# Patient Record
Sex: FEMALE | Race: WHITE | NOT HISPANIC OR LATINO
[De-identification: names, ages, dates, MRNs, and addresses within clinical notes are randomized per-mention and may not be internally consistent; named-entity substitution may affect disease eponyms.]

---

## 2017-07-13 ENCOUNTER — APPOINTMENT (OUTPATIENT)
Dept: GASTROENTEROLOGY | Facility: CLINIC | Age: 57
End: 2017-07-13

## 2017-07-13 VITALS
WEIGHT: 148 LBS | BODY MASS INDEX: 23.23 KG/M2 | HEIGHT: 67 IN | DIASTOLIC BLOOD PRESSURE: 82 MMHG | SYSTOLIC BLOOD PRESSURE: 154 MMHG | HEART RATE: 63 BPM

## 2017-07-24 ENCOUNTER — RX RENEWAL (OUTPATIENT)
Age: 57
End: 2017-07-24

## 2017-10-10 ENCOUNTER — APPOINTMENT (OUTPATIENT)
Dept: GASTROENTEROLOGY | Facility: CLINIC | Age: 57
End: 2017-10-10
Payer: MEDICAID

## 2017-10-10 VITALS
SYSTOLIC BLOOD PRESSURE: 160 MMHG | HEIGHT: 67 IN | WEIGHT: 156 LBS | DIASTOLIC BLOOD PRESSURE: 91 MMHG | BODY MASS INDEX: 24.48 KG/M2 | HEART RATE: 78 BPM

## 2017-10-10 PROCEDURE — 99214 OFFICE O/P EST MOD 30 MIN: CPT

## 2017-10-10 RX ORDER — PANTOPRAZOLE 40 MG/1
40 TABLET, DELAYED RELEASE ORAL
Refills: 0 | Status: DISCONTINUED | COMMUNITY
End: 2017-10-10

## 2017-10-10 RX ORDER — PANTOPRAZOLE 40 MG/1
40 TABLET, DELAYED RELEASE ORAL
Qty: 30 | Refills: 5 | Status: COMPLETED | COMMUNITY
Start: 2017-07-24 | End: 2017-08-19

## 2017-10-10 RX ORDER — RANITIDINE HYDROCHLORIDE 150 MG/1
150 CAPSULE ORAL
Refills: 0 | Status: DISCONTINUED | COMMUNITY
End: 2017-10-10

## 2017-12-12 ENCOUNTER — APPOINTMENT (OUTPATIENT)
Dept: GASTROENTEROLOGY | Facility: CLINIC | Age: 57
End: 2017-12-12

## 2018-01-16 ENCOUNTER — APPOINTMENT (OUTPATIENT)
Dept: CARDIOLOGY | Facility: CLINIC | Age: 58
End: 2018-01-16
Payer: SELF-PAY

## 2018-01-16 PROCEDURE — 93000 ELECTROCARDIOGRAM COMPLETE: CPT

## 2018-01-16 PROCEDURE — 99214 OFFICE O/P EST MOD 30 MIN: CPT

## 2018-02-01 ENCOUNTER — RX RENEWAL (OUTPATIENT)
Age: 58
End: 2018-02-01

## 2018-02-02 ENCOUNTER — RX RENEWAL (OUTPATIENT)
Age: 58
End: 2018-02-02

## 2018-02-02 ENCOUNTER — MEDICATION RENEWAL (OUTPATIENT)
Age: 58
End: 2018-02-02

## 2018-02-05 ENCOUNTER — APPOINTMENT (OUTPATIENT)
Dept: GASTROENTEROLOGY | Facility: CLINIC | Age: 58
End: 2018-02-05
Payer: COMMERCIAL

## 2018-02-05 VITALS
BODY MASS INDEX: 24.33 KG/M2 | OXYGEN SATURATION: 99 % | RESPIRATION RATE: 18 BRPM | DIASTOLIC BLOOD PRESSURE: 81 MMHG | HEIGHT: 67 IN | WEIGHT: 155 LBS | HEART RATE: 81 BPM | SYSTOLIC BLOOD PRESSURE: 160 MMHG

## 2018-02-05 PROCEDURE — 99214 OFFICE O/P EST MOD 30 MIN: CPT

## 2018-02-05 RX ORDER — PANTOPRAZOLE 40 MG/1
40 TABLET, DELAYED RELEASE ORAL
Qty: 30 | Refills: 5 | Status: COMPLETED | COMMUNITY
Start: 2017-08-20 | End: 2017-09-09

## 2018-02-05 RX ORDER — NEBIVOLOL HYDROCHLORIDE 5 MG/1
5 TABLET ORAL DAILY
Qty: 90 | Refills: 3 | Status: COMPLETED | COMMUNITY
End: 2018-02-05

## 2018-02-15 ENCOUNTER — LABORATORY RESULT (OUTPATIENT)
Age: 58
End: 2018-02-15

## 2018-02-16 LAB
BASOPHILS # BLD AUTO: 0.04 K/UL
BASOPHILS NFR BLD AUTO: 0.9 %
EOSINOPHIL # BLD AUTO: 0.17 K/UL
EOSINOPHIL NFR BLD AUTO: 3.7 %
HCT VFR BLD CALC: 43.9 %
HGB BLD-MCNC: 14.6 G/DL
IMM GRANULOCYTES NFR BLD AUTO: 0 %
LYMPHOCYTES # BLD AUTO: 1.82 K/UL
LYMPHOCYTES NFR BLD AUTO: 39.4 %
MAN DIFF?: NORMAL
MCHC RBC-ENTMCNC: 30.5 PG
MCHC RBC-ENTMCNC: 33.3 GM/DL
MCV RBC AUTO: 91.8 FL
MONOCYTES # BLD AUTO: 0.76 K/UL
MONOCYTES NFR BLD AUTO: 16.5 %
NEUTROPHILS # BLD AUTO: 1.83 K/UL
NEUTROPHILS NFR BLD AUTO: 39.5 %
PLATELET # BLD AUTO: 335 K/UL
RBC # BLD: 4.78 M/UL
RBC # FLD: 14 %
WBC # FLD AUTO: 4.62 K/UL

## 2018-02-26 ENCOUNTER — APPOINTMENT (OUTPATIENT)
Dept: PULMONOLOGY | Facility: CLINIC | Age: 58
End: 2018-02-26
Payer: COMMERCIAL

## 2018-02-26 VITALS
BODY MASS INDEX: 24.12 KG/M2 | DIASTOLIC BLOOD PRESSURE: 80 MMHG | OXYGEN SATURATION: 98 % | WEIGHT: 154 LBS | HEART RATE: 89 BPM | SYSTOLIC BLOOD PRESSURE: 120 MMHG

## 2018-02-26 VITALS — RESPIRATION RATE: 16 BRPM

## 2018-02-26 DIAGNOSIS — Z00.00 ENCOUNTER FOR GENERAL ADULT MEDICAL EXAMINATION W/OUT ABNORMAL FINDINGS: ICD-10-CM

## 2018-02-26 PROCEDURE — 94727 GAS DIL/WSHOT DETER LNG VOL: CPT

## 2018-02-26 PROCEDURE — 94729 DIFFUSING CAPACITY: CPT

## 2018-02-26 PROCEDURE — 85018 HEMOGLOBIN: CPT | Mod: QW

## 2018-02-26 PROCEDURE — 94664 DEMO&/EVAL PT USE INHALER: CPT | Mod: 59

## 2018-02-26 PROCEDURE — 94060 EVALUATION OF WHEEZING: CPT

## 2018-02-26 PROCEDURE — 99205 OFFICE O/P NEW HI 60 MIN: CPT | Mod: 25

## 2018-02-26 RX ORDER — AZITHROMYCIN 250 MG/1
250 TABLET, FILM COATED ORAL
Qty: 6 | Refills: 0 | Status: DISCONTINUED | COMMUNITY
Start: 2018-02-16

## 2018-02-26 RX ORDER — RANITIDINE 150 MG/1
150 TABLET ORAL
Qty: 30 | Refills: 0 | Status: DISCONTINUED | COMMUNITY
Start: 2018-02-01 | End: 2018-02-26

## 2018-02-28 ENCOUNTER — OTHER (OUTPATIENT)
Age: 58
End: 2018-02-28

## 2018-04-05 ENCOUNTER — APPOINTMENT (OUTPATIENT)
Dept: PULMONOLOGY | Facility: CLINIC | Age: 58
End: 2018-04-05
Payer: COMMERCIAL

## 2018-04-05 ENCOUNTER — RESULT REVIEW (OUTPATIENT)
Age: 58
End: 2018-04-05

## 2018-04-05 ENCOUNTER — APPOINTMENT (OUTPATIENT)
Dept: DERMATOLOGY | Facility: CLINIC | Age: 58
End: 2018-04-05
Payer: COMMERCIAL

## 2018-04-05 VITALS — SYSTOLIC BLOOD PRESSURE: 124 MMHG | OXYGEN SATURATION: 98 % | HEART RATE: 66 BPM | DIASTOLIC BLOOD PRESSURE: 80 MMHG

## 2018-04-05 VITALS — BODY MASS INDEX: 23.96 KG/M2 | WEIGHT: 153 LBS

## 2018-04-05 PROCEDURE — 99214 OFFICE O/P EST MOD 30 MIN: CPT

## 2018-04-05 PROCEDURE — 99203 OFFICE O/P NEW LOW 30 MIN: CPT | Mod: 25

## 2018-04-05 PROCEDURE — 11100 BX SKIN SUBCUTANEOUS&/MUCOUS MEMBRANE 1 LESION: CPT

## 2018-04-09 ENCOUNTER — RX RENEWAL (OUTPATIENT)
Age: 58
End: 2018-04-09

## 2018-04-23 ENCOUNTER — MEDICATION RENEWAL (OUTPATIENT)
Age: 58
End: 2018-04-23

## 2018-05-07 ENCOUNTER — RX RENEWAL (OUTPATIENT)
Age: 58
End: 2018-05-07

## 2018-05-23 ENCOUNTER — APPOINTMENT (OUTPATIENT)
Dept: GASTROENTEROLOGY | Facility: CLINIC | Age: 58
End: 2018-05-23

## 2018-06-14 ENCOUNTER — APPOINTMENT (OUTPATIENT)
Dept: PULMONOLOGY | Facility: CLINIC | Age: 58
End: 2018-06-14
Payer: COMMERCIAL

## 2018-06-14 ENCOUNTER — APPOINTMENT (OUTPATIENT)
Dept: GASTROENTEROLOGY | Facility: CLINIC | Age: 58
End: 2018-06-14
Payer: COMMERCIAL

## 2018-06-14 VITALS
HEIGHT: 67 IN | DIASTOLIC BLOOD PRESSURE: 70 MMHG | SYSTOLIC BLOOD PRESSURE: 128 MMHG | BODY MASS INDEX: 23.54 KG/M2 | WEIGHT: 150 LBS | HEART RATE: 69 BPM | OXYGEN SATURATION: 98 %

## 2018-06-14 VITALS
HEIGHT: 67 IN | WEIGHT: 150 LBS | DIASTOLIC BLOOD PRESSURE: 89 MMHG | BODY MASS INDEX: 23.54 KG/M2 | RESPIRATION RATE: 16 BRPM | HEART RATE: 69 BPM | SYSTOLIC BLOOD PRESSURE: 150 MMHG

## 2018-06-14 VITALS — RESPIRATION RATE: 16 BRPM

## 2018-06-14 PROCEDURE — 99214 OFFICE O/P EST MOD 30 MIN: CPT

## 2018-06-14 RX ORDER — PANTOPRAZOLE 40 MG/1
40 TABLET, DELAYED RELEASE ORAL
Qty: 60 | Refills: 4 | Status: COMPLETED | COMMUNITY
Start: 2017-10-10 | End: 2018-04-08

## 2018-07-10 ENCOUNTER — APPOINTMENT (OUTPATIENT)
Dept: PULMONOLOGY | Facility: CLINIC | Age: 58
End: 2018-07-10

## 2018-07-12 ENCOUNTER — OTHER (OUTPATIENT)
Age: 58
End: 2018-07-12

## 2018-09-10 ENCOUNTER — RX RENEWAL (OUTPATIENT)
Age: 58
End: 2018-09-10

## 2018-09-13 ENCOUNTER — APPOINTMENT (OUTPATIENT)
Dept: PULMONOLOGY | Facility: CLINIC | Age: 58
End: 2018-09-13

## 2018-09-27 ENCOUNTER — APPOINTMENT (OUTPATIENT)
Dept: GASTROENTEROLOGY | Facility: CLINIC | Age: 58
End: 2018-09-27

## 2018-10-11 ENCOUNTER — RX RENEWAL (OUTPATIENT)
Age: 58
End: 2018-10-11

## 2018-10-22 ENCOUNTER — APPOINTMENT (OUTPATIENT)
Dept: ENDOCRINOLOGY | Facility: CLINIC | Age: 58
End: 2018-10-22
Payer: COMMERCIAL

## 2018-10-22 VITALS
WEIGHT: 142 LBS | BODY MASS INDEX: 22.29 KG/M2 | HEIGHT: 67 IN | SYSTOLIC BLOOD PRESSURE: 122 MMHG | HEART RATE: 64 BPM | DIASTOLIC BLOOD PRESSURE: 80 MMHG

## 2018-10-22 DIAGNOSIS — Z78.9 OTHER SPECIFIED HEALTH STATUS: ICD-10-CM

## 2018-10-22 DIAGNOSIS — Z83.49 FAMILY HISTORY OF OTHER ENDOCRINE, NUTRITIONAL AND METABOLIC DISEASES: ICD-10-CM

## 2018-10-22 DIAGNOSIS — Z80.9 FAMILY HISTORY OF MALIGNANT NEOPLASM, UNSPECIFIED: ICD-10-CM

## 2018-10-22 DIAGNOSIS — Z82.49 FAMILY HISTORY OF ISCHEMIC HEART DISEASE AND OTHER DISEASES OF THE CIRCULATORY SYSTEM: ICD-10-CM

## 2018-10-22 PROCEDURE — 99214 OFFICE O/P EST MOD 30 MIN: CPT

## 2018-10-29 ENCOUNTER — OTHER (OUTPATIENT)
Age: 58
End: 2018-10-29

## 2018-10-29 LAB
ALBUMIN SERPL ELPH-MCNC: 3.9 G/DL
ALP BLD-CCNC: 87 U/L
ALT SERPL-CCNC: 19 U/L
ANION GAP SERPL CALC-SCNC: 12 MMOL/L
AST SERPL-CCNC: 15 U/L
BASOPHILS # BLD AUTO: 0.03 K/UL
BASOPHILS NFR BLD AUTO: 0.4 %
BILIRUB SERPL-MCNC: 0.4 MG/DL
BUN SERPL-MCNC: 14 MG/DL
CALCIUM SERPL-MCNC: 9.8 MG/DL
CHLORIDE SERPL-SCNC: 106 MMOL/L
CO2 SERPL-SCNC: 27 MMOL/L
CREAT SERPL-MCNC: 0.65 MG/DL
EOSINOPHIL # BLD AUTO: 0.26 K/UL
EOSINOPHIL NFR BLD AUTO: 3.3 %
GLUCOSE SERPL-MCNC: 97 MG/DL
HCT VFR BLD CALC: 40.1 %
HGB BLD-MCNC: 13.3 G/DL
IMM GRANULOCYTES NFR BLD AUTO: 0.3 %
LYMPHOCYTES # BLD AUTO: 3.23 K/UL
LYMPHOCYTES NFR BLD AUTO: 40.6 %
MAN DIFF?: NORMAL
MCHC RBC-ENTMCNC: 28.7 PG
MCHC RBC-ENTMCNC: 33.2 GM/DL
MCV RBC AUTO: 86.4 FL
MONOCYTES # BLD AUTO: 0.63 K/UL
MONOCYTES NFR BLD AUTO: 7.9 %
NEUTROPHILS # BLD AUTO: 3.79 K/UL
NEUTROPHILS NFR BLD AUTO: 47.5 %
PLATELET # BLD AUTO: 365 K/UL
POTASSIUM SERPL-SCNC: 4 MMOL/L
PROT SERPL-MCNC: 7 G/DL
RBC # BLD: 4.64 M/UL
RBC # FLD: 12.7 %
SODIUM SERPL-SCNC: 145 MMOL/L
T3 SERPL-MCNC: 197 NG/DL
T4 FREE SERPL-MCNC: 2.5 NG/DL
TSH SERPL-ACNC: <0.01 UIU/ML
TSI ACT/NOR SER: 3.59 IU/L
WBC # FLD AUTO: 7.96 K/UL

## 2018-11-13 ENCOUNTER — OTHER (OUTPATIENT)
Age: 58
End: 2018-11-13

## 2018-11-18 ENCOUNTER — FORM ENCOUNTER (OUTPATIENT)
Age: 58
End: 2018-11-18

## 2018-11-19 ENCOUNTER — OUTPATIENT (OUTPATIENT)
Dept: OUTPATIENT SERVICES | Facility: HOSPITAL | Age: 58
LOS: 1 days | End: 2018-11-19

## 2018-11-19 ENCOUNTER — APPOINTMENT (OUTPATIENT)
Dept: NUCLEAR MEDICINE | Facility: CLINIC | Age: 58
End: 2018-11-19
Payer: COMMERCIAL

## 2018-11-19 DIAGNOSIS — E05.00 THYROTOXICOSIS WITH DIFFUSE GOITER WITHOUT THYROTOXIC CRISIS OR STORM: ICD-10-CM

## 2018-11-19 PROCEDURE — 78014 THYROID IMAGING W/BLOOD FLOW: CPT | Mod: 26

## 2018-11-20 ENCOUNTER — APPOINTMENT (OUTPATIENT)
Dept: NUCLEAR MEDICINE | Facility: CLINIC | Age: 58
End: 2018-11-20

## 2018-11-21 ENCOUNTER — RESULT REVIEW (OUTPATIENT)
Age: 58
End: 2018-11-21

## 2018-11-27 ENCOUNTER — RX RENEWAL (OUTPATIENT)
Age: 58
End: 2018-11-27

## 2018-11-28 ENCOUNTER — OTHER (OUTPATIENT)
Age: 58
End: 2018-11-28

## 2018-11-28 ENCOUNTER — RX RENEWAL (OUTPATIENT)
Age: 58
End: 2018-11-28

## 2019-01-08 ENCOUNTER — RECORD ABSTRACTING (OUTPATIENT)
Age: 59
End: 2019-01-08

## 2019-01-10 RX ORDER — MESALAMINE 375 MG/1
0.38 CAPSULE, EXTENDED RELEASE ORAL DAILY
Qty: 360 | Refills: 1 | Status: DISCONTINUED | COMMUNITY
Start: 2018-06-14 | End: 2019-01-10

## 2019-01-11 LAB
BASOPHILS # BLD AUTO: 0.04 K/UL
BASOPHILS NFR BLD AUTO: 0.5 %
EOSINOPHIL # BLD AUTO: 0.28 K/UL
EOSINOPHIL NFR BLD AUTO: 3.4 %
HCT VFR BLD CALC: 43.3 %
HGB BLD-MCNC: 14 G/DL
IMM GRANULOCYTES NFR BLD AUTO: 0.2 %
LYMPHOCYTES # BLD AUTO: 2.71 K/UL
LYMPHOCYTES NFR BLD AUTO: 33.4 %
MAN DIFF?: NORMAL
MCHC RBC-ENTMCNC: 29.2 PG
MCHC RBC-ENTMCNC: 32.3 GM/DL
MCV RBC AUTO: 90.4 FL
MONOCYTES # BLD AUTO: 0.54 K/UL
MONOCYTES NFR BLD AUTO: 6.7 %
NEUTROPHILS # BLD AUTO: 4.53 K/UL
NEUTROPHILS NFR BLD AUTO: 55.8 %
PLATELET # BLD AUTO: 425 K/UL
RBC # BLD: 4.79 M/UL
RBC # FLD: 14 %
WBC # FLD AUTO: 8.12 K/UL

## 2019-01-14 LAB
ALBUMIN SERPL ELPH-MCNC: 4.4 G/DL
ALP BLD-CCNC: 102 U/L
ALT SERPL-CCNC: 28 U/L
ANION GAP SERPL CALC-SCNC: 12 MMOL/L
AST SERPL-CCNC: 22 U/L
BILIRUB SERPL-MCNC: 0.4 MG/DL
BUN SERPL-MCNC: 11 MG/DL
CALCIUM SERPL-MCNC: 10.1 MG/DL
CHLORIDE SERPL-SCNC: 104 MMOL/L
CO2 SERPL-SCNC: 26 MMOL/L
CREAT SERPL-MCNC: 0.9 MG/DL
GLUCOSE SERPL-MCNC: 107 MG/DL
POTASSIUM SERPL-SCNC: 4.6 MMOL/L
PROT SERPL-MCNC: 7.3 G/DL
SODIUM SERPL-SCNC: 142 MMOL/L
T3 SERPL-MCNC: 130 NG/DL
T4 FREE SERPL-MCNC: 1.1 NG/DL
TSH SERPL-ACNC: 0.01 UIU/ML

## 2019-01-15 ENCOUNTER — APPOINTMENT (OUTPATIENT)
Dept: ENDOCRINOLOGY | Facility: CLINIC | Age: 59
End: 2019-01-15
Payer: COMMERCIAL

## 2019-01-15 VITALS
HEIGHT: 67 IN | SYSTOLIC BLOOD PRESSURE: 140 MMHG | WEIGHT: 148 LBS | HEART RATE: 64 BPM | BODY MASS INDEX: 23.23 KG/M2 | DIASTOLIC BLOOD PRESSURE: 90 MMHG

## 2019-01-15 DIAGNOSIS — Z87.19 PERSONAL HISTORY OF OTHER DISEASES OF THE DIGESTIVE SYSTEM: ICD-10-CM

## 2019-01-15 PROCEDURE — 99214 OFFICE O/P EST MOD 30 MIN: CPT

## 2019-01-15 RX ORDER — KETOCONAZOLE 20 MG/G
2 CREAM TOPICAL
Qty: 60 | Refills: 0 | Status: DISCONTINUED | COMMUNITY
Start: 2018-04-10 | End: 2019-01-15

## 2019-01-15 RX ORDER — BUDESONIDE 3 MG/1
3 CAPSULE, COATED PELLETS ORAL
Qty: 270 | Refills: 1 | Status: DISCONTINUED | COMMUNITY
Start: 2018-05-07 | End: 2019-01-15

## 2019-01-15 RX ORDER — RANITIDINE 300 MG/1
300 TABLET ORAL
Qty: 90 | Refills: 2 | Status: DISCONTINUED | COMMUNITY
Start: 2018-11-28 | End: 2019-01-15

## 2019-01-15 RX ORDER — PREDNISONE 20 MG/1
20 TABLET ORAL
Qty: 10 | Refills: 0 | Status: DISCONTINUED | COMMUNITY
Start: 2018-01-01 | End: 2019-01-15

## 2019-01-15 RX ORDER — HYDROCORTISONE VALERATE 2 MG/G
0.2 CREAM TOPICAL
Qty: 60 | Refills: 0 | Status: DISCONTINUED | COMMUNITY
Start: 2018-04-05 | End: 2019-01-15

## 2019-01-15 RX ORDER — RANITIDINE HYDROCHLORIDE 150 MG/1
150 CAPSULE ORAL
Qty: 90 | Refills: 3 | Status: DISCONTINUED | COMMUNITY
Start: 2017-09-18 | End: 2019-01-15

## 2019-01-15 NOTE — PHYSICAL EXAM
[Alert] : alert [No Acute Distress] : no acute distress [Well Nourished] : well nourished [Well Developed] : well developed [Normal Sclera/Conjunctiva] : normal sclera/conjunctiva [EOMI] : extra ocular movement intact [No Proptosis] : no proptosis [Normal Oropharynx] : the oropharynx was normal [Thyroid Not Enlarged] : the thyroid was not enlarged [No Thyroid Nodules] : there were no palpable thyroid nodules [No Respiratory Distress] : no respiratory distress [No Accessory Muscle Use] : no accessory muscle use [Clear to Auscultation] : lungs were clear to auscultation bilaterally [Normal Rate] : heart rate was normal  [Normal S1, S2] : normal S1 and S2 [Regular Rhythm] : with a regular rhythm [Pedal Pulses Normal] : the pedal pulses are present [No Edema] : there was no peripheral edema [Normal Bowel Sounds] : normal bowel sounds [Not Tender] : non-tender [Soft] : abdomen soft [Not Distended] : not distended [Post Cervical Nodes] : posterior cervical nodes [Anterior Cervical Nodes] : anterior cervical nodes [Normal] : normal and non tender [No Stigmata of Cushings Syndrome] : no stigmata of cushings syndrome [Normal Gait] : normal gait [Normal Strength/Tone] : muscle strength and tone were normal [No Rash] : no rash [Normal Reflexes] : deep tendon reflexes were 2+ and symmetric [No Tremors] : no tremors [Oriented x3] : oriented to person, place, and time [Acanthosis Nigricans] : no acanthosis nigricans

## 2019-01-15 NOTE — ASSESSMENT
[Methimazole Therapy] : Risks and benefits of methimazole therapy were discussed with the patient,  including rash, liver dysfunction, and agranulocytosis.  Patient was instructed to call the office for flu-like symptoms eg fever and sore-throat [FreeTextEntry1] : Graves disease \par thyroid US shows multiple subcentimeter nodule stable. l that don't require FNA \par thyroid uptake/scan shows uptake 65 % \par Tfst are getting better with normal Ft4 and TT 3 but TSH still suppressed but it could lag \par decrease MMi to 5 mg qd   \par \par NTMNG : \par f/p FNA benign 2016 \par b?l subcentimter nodules. Monitor and repeat US in Nov 2019 \par \par Graves : discussed autoimmunity etiology

## 2019-02-27 ENCOUNTER — RX RENEWAL (OUTPATIENT)
Age: 59
End: 2019-02-27

## 2019-03-15 ENCOUNTER — APPOINTMENT (OUTPATIENT)
Dept: GASTROENTEROLOGY | Facility: CLINIC | Age: 59
End: 2019-03-15
Payer: COMMERCIAL

## 2019-03-15 VITALS
HEIGHT: 67 IN | HEART RATE: 79 BPM | WEIGHT: 151 LBS | DIASTOLIC BLOOD PRESSURE: 80 MMHG | SYSTOLIC BLOOD PRESSURE: 120 MMHG | BODY MASS INDEX: 23.7 KG/M2

## 2019-03-15 PROCEDURE — 99214 OFFICE O/P EST MOD 30 MIN: CPT

## 2019-03-15 NOTE — HISTORY OF PRESENT ILLNESS
[de-identified] : Patient is here for followup of GERD and collagenous colitis\par     The patient has had GERD for over 10 years. Specifically she gets acid regurgitation. 8 symptoms are severe and occurs after eating. She gets nausea and bloating as well. Currently she is on Protonix in the morning and  3 pm. She was taking Zantac 300 mg q.h.s. Despite that she still gets severe heartburn. She wakes up a sore throat in the morning. She elevate the head of her bed at night and still gets a sour taste in her mouth in the morning. Symptoms last hours\par     The patient has a long-standing history of collagenous colitis. She was taking his aspirin milligrams b.i.d. she stopped as his symptoms had completely resolved. When she stopped the diarrhea resumed. She is now again on  budesonide 3 mg b.i.d. she states when she takes the budesonide 3 times a day she gets constipated. At that time dozing she gets a bowel movement in the morning but states that she urinates and has a bowel movement at the same time. She feels that she has no controlled with the bowel movement though she denies diarrhea.

## 2019-03-15 NOTE — ASSESSMENT
[FreeTextEntry1] : A/P\par The patient is to follow GERD. She will continue with Protonix in the morning, Protonix at 3 PM, and Zantac 200 mg q.h.s. I will add Carafate 1 g q.h.s. and 1 g in the morning when she wakes up\par Today's instructions for acid reflux include avoid provocative foods. For example citrus alcohol coffee chocolate mints. Smaller meals, no eating 3 hours prior to bedtime and elevate head of the bed prior to sleep.\par - Collagenous colitis. The patient will increase her budesonide 3 mg t.i.d.\par -Follow up in 3 months

## 2019-03-15 NOTE — PHYSICAL EXAM
[General Appearance - Alert] : alert [General Appearance - In No Acute Distress] : in no acute distress [General Appearance - Well Nourished] : well nourished [General Appearance - Well Developed] : well developed [Sclera] : the sclera and conjunctiva were normal [PERRL With Normal Accommodation] : pupils were equal in size, round, and reactive to light [Extraocular Movements] : extraocular movements were intact [Outer Ear] : the ears and nose were normal in appearance [Neck Appearance] : the appearance of the neck was normal [Respiration, Rhythm And Depth] : normal respiratory rhythm and effort [Exaggerated Use Of Accessory Muscles For Inspiration] : no accessory muscle use [Auscultation Breath Sounds / Voice Sounds] : lungs were clear to auscultation bilaterally [Apical Impulse] : the apical impulse was normal [Heart Rate And Rhythm] : heart rate was normal and rhythm regular [Heart Sounds] : normal S1 and S2 [Bowel Sounds] : normal bowel sounds [Abdomen Soft] : soft [Abdomen Tenderness] : non-tender [Skin Color & Pigmentation] : normal skin color and pigmentation [Skin Turgor] : normal skin turgor [] : no rash [Oriented To Time, Place, And Person] : oriented to person, place, and time [Impaired Insight] : insight and judgment were intact [Affect] : the affect was normal

## 2019-04-01 ENCOUNTER — RX RENEWAL (OUTPATIENT)
Age: 59
End: 2019-04-01

## 2019-04-03 ENCOUNTER — RX RENEWAL (OUTPATIENT)
Age: 59
End: 2019-04-03

## 2019-04-11 ENCOUNTER — LABORATORY RESULT (OUTPATIENT)
Age: 59
End: 2019-04-11

## 2019-04-11 ENCOUNTER — APPOINTMENT (OUTPATIENT)
Dept: CARDIOLOGY | Facility: CLINIC | Age: 59
End: 2019-04-11
Payer: COMMERCIAL

## 2019-04-11 ENCOUNTER — NON-APPOINTMENT (OUTPATIENT)
Age: 59
End: 2019-04-11

## 2019-04-11 VITALS
RESPIRATION RATE: 16 BRPM | BODY MASS INDEX: 23.54 KG/M2 | HEART RATE: 59 BPM | HEIGHT: 67 IN | SYSTOLIC BLOOD PRESSURE: 163 MMHG | DIASTOLIC BLOOD PRESSURE: 104 MMHG | WEIGHT: 150 LBS

## 2019-04-11 PROCEDURE — 93000 ELECTROCARDIOGRAM COMPLETE: CPT

## 2019-04-11 PROCEDURE — 99214 OFFICE O/P EST MOD 30 MIN: CPT

## 2019-04-11 RX ORDER — METHIMAZOLE 10 MG/1
10 TABLET ORAL
Qty: 90 | Refills: 1 | Status: DISCONTINUED | COMMUNITY
Start: 2018-11-21 | End: 2019-04-11

## 2019-04-11 RX ORDER — HYDROCODONE BITARTRATE AND HOMATROPINE METHYLBROMIDE 5; 1.5 MG/5ML; MG/5ML
5-1.5 SOLUTION ORAL
Qty: 60 | Refills: 0 | Status: DISCONTINUED | COMMUNITY
Start: 2018-01-02 | End: 2019-04-11

## 2019-04-11 RX ORDER — ALBUTEROL SULFATE 90 UG/1
108 (90 BASE) AEROSOL, METERED RESPIRATORY (INHALATION)
Qty: 8 | Refills: 0 | Status: DISCONTINUED | COMMUNITY
Start: 2018-02-01 | End: 2019-04-11

## 2019-04-11 RX ORDER — LOTEPREDNOL ETABONATE 5 MG/ML
0.5 SUSPENSION/ DROPS OPHTHALMIC
Qty: 5 | Refills: 0 | Status: DISCONTINUED | COMMUNITY
Start: 2018-05-21 | End: 2019-04-11

## 2019-04-11 NOTE — HISTORY OF PRESENT ILLNESS
[FreeTextEntry1] : Patient is a 59yo F with HTN, mild carotid disease, MR, GERD, collagenous colitis, RVOT PVC's here for cardiac follow up. Doing well from CV standpoint. Patient denies PND/orthopnea/edema/palpitations/syncope/claudication. Still with chest discomfort that feels like someone sitting on her. HAs been getting worsening heart burn recently. Legs get weak with exertion but no exertional CP, mild SOB up stairs. HAs cut down on smoking but continues to.   \par \par ROS: Neuro and  negative

## 2019-04-11 NOTE — DISCUSSION/SUMMARY
[FreeTextEntry1] : Patient is a 59yo F with HTN, mild carotid disease, MR, GERD, collagenous colitis, RVOT PVC's here for cardiac follow up. BP above goal, also with mild MOORE and looking to exercise. Needs ischemic work up prior. Chronic chest pain most likely related to GERD but stress test will help evaluate this as well. Need re-evaluation of carotids and check lipids, will need statin therapy and will discuss at follow up. \par \par 1. Increase Bystolic to 10mg daily for HTN\par 2. Counselled for more than 3 minutes on smoking cessation, not ready to quit all together, has cut back\par 3. Echo and nuclear stress test to evaluate MOORE and chest pain\par 4. Carotid duplex for surveillance of carotid disease\par 5. Check fasting lipids, will discuss statin at follow up\par 6. Follow up after testing

## 2019-04-11 NOTE — PHYSICAL EXAM
[General Appearance - Well Developed] : well developed [Normal Conjunctiva] : the conjunctiva exhibited no abnormalities [General Appearance - Well Nourished] : well nourished [Normal Oropharynx] : normal oropharynx [Normal Jugular Venous V Waves Present] : normal jugular venous V waves present [Respiration, Rhythm And Depth] : normal respiratory rhythm and effort [] : no respiratory distress [Auscultation Breath Sounds / Voice Sounds] : lungs were clear to auscultation bilaterally [Heart Rate And Rhythm] : heart rate and rhythm were normal [Heart Sounds] : normal S1 and S2 [Bowel Sounds] : normal bowel sounds [Murmurs] : no murmurs present [Abdomen Tenderness] : non-tender [Abdomen Soft] : soft [Abnormal Walk] : normal gait [Nail Clubbing] : no clubbing of the fingernails [Cyanosis, Localized] : no localized cyanosis [Skin Color & Pigmentation] : normal skin color and pigmentation [Skin Turgor] : normal skin turgor [Affect] : the affect was normal [Oriented To Time, Place, And Person] : oriented to person, place, and time [FreeTextEntry1] : no edema

## 2019-04-11 NOTE — ASSESSMENT
[FreeTextEntry1] : ECG: SR, NSST \par \par CAROTID 2016:\par 1. All arteries were clearly visualized.\par 2. Normal LICA.\par 3. There is 1-49% stenosis of the right proximal ICA.\par 4. Normal left ECA.\par 5. There is <50% stenosis of the left ECA.\par 6. There is antegrade flow in the right and left vertebral arteries.\par 7. Recommend clinical correlation with the above findings.\par \par TED 2014:\par 1. Negative stress echo for ischemia.\par 2. Sensitivity mildly reduced due to difficult acoustic windows.\par 3. Normal global left ventricular systolic function.\par 4. The EKG was negative for ischemia.\par 5. The patient developed no symptoms during the stress exam.\par 6. The test was terminated due to fatigue.\par 7. The blood pressure response was normal.\par 8. The patient developed no dysrhythmias during exercise.\par 9. The patient's functional capacity was above average.\par 10. The Duke Treadmill Score was 10, consistent with low risk.\par 11. There is no comparison study available.\par 12. Recommend clinical correlation with the above findings.\par \par ECHO 2014:\par 1. Left ventricular ejection fraction, by visual estimation, is 60 to 65%.\par 2. Normal left ventricular size and wall thickness, with normal systolic and diastolic function.\par 3. Normal right ventricle size and function with mildly increased RV wall thickness.\par 4. The left atrium is normal in size and structure.\par 5. The right atrium is normal in size and structure.\par 6. Mild thickening and calcification of the anterior and posterior mitral valve leaflets.\par 7. Mild mitral valve regurgitation.\par 8. Normal aortic valve, without any evidence of aortic stenosis or insufficiency.\par 9. Mild tricuspid regurgitation.\par 10. Trace pulmonic valve regurgitation.\par 11. Intra-atrial septum is aneurysmal without evidence of intra-atrial shunting.\par 12. There is a trivial pericardial effusion.\par 13. Recommend clinical correlation with the above findings.

## 2019-04-16 ENCOUNTER — APPOINTMENT (OUTPATIENT)
Dept: ENDOCRINOLOGY | Facility: CLINIC | Age: 59
End: 2019-04-16
Payer: COMMERCIAL

## 2019-04-16 VITALS
HEART RATE: 64 BPM | WEIGHT: 150 LBS | BODY MASS INDEX: 23.54 KG/M2 | HEIGHT: 67 IN | SYSTOLIC BLOOD PRESSURE: 164 MMHG | DIASTOLIC BLOOD PRESSURE: 110 MMHG

## 2019-04-16 PROCEDURE — 99213 OFFICE O/P EST LOW 20 MIN: CPT

## 2019-04-16 NOTE — ASSESSMENT
[FreeTextEntry1] : Graves disease \par pt euthyroid clinically and biochemically\par decrease MMi to 2.5 mg 3d/week \par thyroid US shows multiple subcentimeter nodule stable. monitor and repeat US in Nov 2019\par thyroid uptake/scan shows uptake 65 %    \par \par NTMNG : \par f/p FNA benign 2016 \par b/ll subcentimeter nodules. Monitor and repeat US in Nov 2019 \par \par Graves : discussed autoimmunity etiology

## 2019-04-16 NOTE — PHYSICAL EXAM
[Alert] : alert [No Acute Distress] : no acute distress [Well Nourished] : well nourished [Well Developed] : well developed [Normal Sclera/Conjunctiva] : normal sclera/conjunctiva [EOMI] : extra ocular movement intact [Normal Oropharynx] : the oropharynx was normal [No Proptosis] : no proptosis [No Thyroid Nodules] : there were no palpable thyroid nodules [Thyroid Not Enlarged] : the thyroid was not enlarged [No Respiratory Distress] : no respiratory distress [No Accessory Muscle Use] : no accessory muscle use [Clear to Auscultation] : lungs were clear to auscultation bilaterally [Normal Rate] : heart rate was normal  [Normal S1, S2] : normal S1 and S2 [Regular Rhythm] : with a regular rhythm [Pedal Pulses Normal] : the pedal pulses are present [Normal Bowel Sounds] : normal bowel sounds [No Edema] : there was no peripheral edema [Not Tender] : non-tender [Soft] : abdomen soft [Not Distended] : not distended [Post Cervical Nodes] : posterior cervical nodes [Anterior Cervical Nodes] : anterior cervical nodes [Normal] : normal and non tender [Normal Gait] : normal gait [No Stigmata of Cushings Syndrome] : no stigmata of cushings syndrome [No Rash] : no rash [Normal Strength/Tone] : muscle strength and tone were normal [Normal Reflexes] : deep tendon reflexes were 2+ and symmetric [No Tremors] : no tremors [Oriented x3] : oriented to person, place, and time [Acanthosis Nigricans] : no acanthosis nigricans

## 2019-04-22 ENCOUNTER — RX RENEWAL (OUTPATIENT)
Age: 59
End: 2019-04-22

## 2019-05-07 ENCOUNTER — APPOINTMENT (OUTPATIENT)
Dept: CARDIOLOGY | Facility: CLINIC | Age: 59
End: 2019-05-07
Payer: COMMERCIAL

## 2019-05-07 PROCEDURE — 93880 EXTRACRANIAL BILAT STUDY: CPT

## 2019-05-07 PROCEDURE — 93306 TTE W/DOPPLER COMPLETE: CPT

## 2019-05-09 ENCOUNTER — APPOINTMENT (OUTPATIENT)
Dept: CARDIOLOGY | Facility: CLINIC | Age: 59
End: 2019-05-09
Payer: COMMERCIAL

## 2019-05-09 PROCEDURE — 93015 CV STRESS TEST SUPVJ I&R: CPT

## 2019-05-09 PROCEDURE — 78452 HT MUSCLE IMAGE SPECT MULT: CPT

## 2019-05-09 PROCEDURE — A9500: CPT

## 2019-05-09 RX ORDER — REGADENOSON 0.08 MG/ML
0.4 INJECTION, SOLUTION INTRAVENOUS
Qty: 4 | Refills: 0 | Status: COMPLETED | OUTPATIENT
Start: 2019-05-09

## 2019-05-09 RX ADMIN — REGADENOSON 0 MG/5ML: 0.08 INJECTION, SOLUTION INTRAVENOUS at 00:00

## 2019-05-17 RX ORDER — KIT FOR THE PREPARATION OF TECHNETIUM TC99M SESTAMIBI 1 MG/5ML
INJECTION, POWDER, LYOPHILIZED, FOR SOLUTION PARENTERAL
Refills: 0 | Status: COMPLETED | OUTPATIENT
Start: 2019-05-17

## 2019-05-17 RX ADMIN — KIT FOR THE PREPARATION OF TECHNETIUM TC99M SESTAMIBI 0: 1 INJECTION, POWDER, LYOPHILIZED, FOR SOLUTION PARENTERAL at 00:00

## 2019-05-24 ENCOUNTER — APPOINTMENT (OUTPATIENT)
Dept: GASTROENTEROLOGY | Facility: CLINIC | Age: 59
End: 2019-05-24
Payer: COMMERCIAL

## 2019-05-24 VITALS
HEART RATE: 74 BPM | HEIGHT: 67 IN | WEIGHT: 145 LBS | DIASTOLIC BLOOD PRESSURE: 70 MMHG | BODY MASS INDEX: 22.76 KG/M2 | SYSTOLIC BLOOD PRESSURE: 130 MMHG

## 2019-05-24 DIAGNOSIS — R10.30 LOWER ABDOMINAL PAIN, UNSPECIFIED: ICD-10-CM

## 2019-05-24 PROCEDURE — 99214 OFFICE O/P EST MOD 30 MIN: CPT

## 2019-05-24 NOTE — PHYSICAL EXAM
[General Appearance - Alert] : alert [General Appearance - In No Acute Distress] : in no acute distress [General Appearance - Well Developed] : well developed [General Appearance - Well Nourished] : well nourished [Sclera] : the sclera and conjunctiva were normal [Outer Ear] : the ears and nose were normal in appearance [Neck Appearance] : the appearance of the neck was normal [Neck Cervical Mass (___cm)] : no neck mass was observed [Exaggerated Use Of Accessory Muscles For Inspiration] : no accessory muscle use [Respiration, Rhythm And Depth] : normal respiratory rhythm and effort [Apical Impulse] : the apical impulse was normal [Auscultation Breath Sounds / Voice Sounds] : lungs were clear to auscultation bilaterally [Heart Sounds] : normal S1 and S2 [Heart Rate And Rhythm] : heart rate was normal and rhythm regular [Abdomen Soft] : soft [Bowel Sounds] : normal bowel sounds [Skin Turgor] : normal skin turgor [Abdomen Tenderness] : non-tender [Skin Color & Pigmentation] : normal skin color and pigmentation [] : no rash [Oriented To Time, Place, And Person] : oriented to person, place, and time [Affect] : the affect was normal [Impaired Insight] : insight and judgment were intact

## 2019-05-24 NOTE — ASSESSMENT
[FreeTextEntry1] : A/P\par The patient has a history of collagenous colitis.She does not want to take budesonide t.i.d. as he states he does her constipation. She states that both budesonide b.i.d. she is having severe diarrhea at times and has abdominal cramping every day which is new in the past one to 2 months\par - A CT scan of the abdomen to evaluate abdominal pain\par -I ordered stool studies to rule out infectious causes of her diarrhea\par -Ordered a celiac panel, CBC, CMP, ESR . TSH was normal\par -If the above are negative then we will consider repeating a colonoscopy\par \par gerd\par Today's instructions for acid reflux include avoid provocative foods. For example citrus alcohol coffee chocolate mints. Smaller meals, no eating 3 hours prior to bedtime and elevate head of the bed prior to sleep.\par protonix 40 mg bid ( am and 3 pm ) and zantac 300 mg q hs

## 2019-05-24 NOTE — HISTORY OF PRESENT ILLNESS
[de-identified] : The patient is here for followup of collagenous colitis and GERD\par    The patient has had collagenous colitis for many years. When she takes budesonide 3 mg t.i.d. she gets constipation. She is now taking budesonide 2 times a day. She says however she gets episodes with the diarrhea is uncontrollable and she gets associated abdominal cramping which is new. The cramping is constant and happens every day. She has no rectal bleeding and no weight loss. Her CBC and CMP in April of 2019 was normal. She had a colonoscopy in 2015 which showed internal hemorrhoids done elsewhere. Please note on the fourth gastroenterologist she has seen for these issues\par    The patient is a history of GERD for over 10 years. Her heartburn and regurgitation or severe. She also gets associated bloating. She has breakthrough symptoms despite her Protonix and Zantac. She is on Protonix 40 mg in the morning and then takes 40 mg at 3 PM. She is on Zantac 300 mg q.h.s. She was told to take the Carafate p.r.n. but does not seem to be taking it consistently.

## 2019-05-29 ENCOUNTER — RX CHANGE (OUTPATIENT)
Age: 59
End: 2019-05-29

## 2019-05-29 ENCOUNTER — OTHER (OUTPATIENT)
Age: 59
End: 2019-05-29

## 2019-05-30 ENCOUNTER — APPOINTMENT (OUTPATIENT)
Dept: CARDIOLOGY | Facility: CLINIC | Age: 59
End: 2019-05-30
Payer: COMMERCIAL

## 2019-05-30 VITALS
OXYGEN SATURATION: 100 % | HEART RATE: 59 BPM | DIASTOLIC BLOOD PRESSURE: 83 MMHG | HEIGHT: 67 IN | RESPIRATION RATE: 15 BRPM | SYSTOLIC BLOOD PRESSURE: 191 MMHG | WEIGHT: 152 LBS | BODY MASS INDEX: 23.86 KG/M2

## 2019-05-30 VITALS — SYSTOLIC BLOOD PRESSURE: 180 MMHG | DIASTOLIC BLOOD PRESSURE: 90 MMHG

## 2019-05-30 PROCEDURE — 99214 OFFICE O/P EST MOD 30 MIN: CPT

## 2019-05-30 NOTE — ASSESSMENT
[FreeTextEntry1] : \par PHARM NUCLEAR STRESS TEST 5/2019:\par 1. Negative for ischemia, EF 72%\par 2. BP hypertensive at baseline with normal response\par \par ECHO 5/2019:\par 1. Mildly increased LV wall thickness\par 2. Normal LV function, EF 65-70%\par 3. Mildly increased RV thickness\par 4. Trivial pericardial effusion\par \par CAROTID DUPLEX 5/2019\par 1. No hemodynamically significant stenosis\par 2. antegrade flow in verterbral arteries

## 2019-05-30 NOTE — DISCUSSION/SUMMARY
[FreeTextEntry1] : Patient is a 57yo F with HTN, minimal carotid disease, MR, GERD, collagenous colitis, RVOT PVC's here for cardiac follow up. BP above goal, also with mild MOORE. Stress test without ischemia. Echo with increased wall thickness but otherwise unremarkable. No significant carotid disease. BP remains high, possibly related to recent colitis exacerbation and abdominal pain, possibly from thyroid issues which are now better. Doubt other secondary cause and no need for secondary work up. Will increase bystolic for now. \par \par 1. Increase Bystolic to 20mg daily, Patient to monitor blood pressure at home twice per day for 1 week prior to follow up visit and bring in readings review and cuff to calibrate \par 2. May need to add ARB for HTN, expect improvement as GI issues improve\par 3. Recommend aggressive diet and lifestyle modifications \par 4. Recommend 30 minutes moderate intensity aerobic activity 5 days per week \par 5. Follow up 3 months, check lipids then,. Remains unwilling to take statins

## 2019-05-30 NOTE — HISTORY OF PRESENT ILLNESS
[FreeTextEntry1] : Patient is a 59yo F with HTN, mild carotid disease, MR, GERD, collagenous colitis, RVOT PVC's here for cardiac follow up. Doing well from CV standpoint. Patient denies PND/orthopnea/edema/palpitations/syncope/claudication. Still with chest discomfort that feels like someone sitting on her. HAs been getting worsening heart burn recently. Bystolic increased at last visit. \par \par ROS: Neuro and  negative

## 2019-05-31 LAB
ALBUMIN SERPL ELPH-MCNC: 4.2 G/DL
ALP BLD-CCNC: 107 U/L
ALT SERPL-CCNC: 19 U/L
ANION GAP SERPL CALC-SCNC: 14 MMOL/L
AST SERPL-CCNC: 16 U/L
BASOPHILS # BLD AUTO: 0.06 K/UL
BASOPHILS NFR BLD AUTO: 0.6 %
BILIRUB SERPL-MCNC: 0.2 MG/DL
BUN SERPL-MCNC: 10 MG/DL
CALCIUM SERPL-MCNC: 9.2 MG/DL
CHLORIDE SERPL-SCNC: 104 MMOL/L
CO2 SERPL-SCNC: 25 MMOL/L
CREAT SERPL-MCNC: 0.79 MG/DL
CRP SERPL-MCNC: 0.36 MG/DL
EOSINOPHIL # BLD AUTO: 0.14 K/UL
EOSINOPHIL NFR BLD AUTO: 1.4 %
ERYTHROCYTE [SEDIMENTATION RATE] IN BLOOD BY WESTERGREN METHOD: 33 MM/HR
GLUCOSE SERPL-MCNC: 75 MG/DL
HCT VFR BLD CALC: 40.3 %
HGB BLD-MCNC: 13.5 G/DL
IGA SER QL IEP: 355 MG/DL
IMM GRANULOCYTES NFR BLD AUTO: 0.4 %
LYMPHOCYTES # BLD AUTO: 3.21 K/UL
LYMPHOCYTES NFR BLD AUTO: 31.8 %
MAN DIFF?: NORMAL
MCHC RBC-ENTMCNC: 30.5 PG
MCHC RBC-ENTMCNC: 33.5 GM/DL
MCV RBC AUTO: 91.2 FL
MONOCYTES # BLD AUTO: 0.54 K/UL
MONOCYTES NFR BLD AUTO: 5.3 %
NEUTROPHILS # BLD AUTO: 6.12 K/UL
NEUTROPHILS NFR BLD AUTO: 60.5 %
PLATELET # BLD AUTO: 388 K/UL
POTASSIUM SERPL-SCNC: 3.6 MMOL/L
PROT SERPL-MCNC: 7.1 G/DL
RBC # BLD: 4.42 M/UL
RBC # FLD: 13 %
SODIUM SERPL-SCNC: 143 MMOL/L
WBC # FLD AUTO: 10.11 K/UL

## 2019-06-03 LAB
ENDOMYSIUM IGA SER QL: NEGATIVE
ENDOMYSIUM IGA TITR SER: NORMAL

## 2019-06-05 ENCOUNTER — OUTPATIENT (OUTPATIENT)
Dept: OUTPATIENT SERVICES | Facility: HOSPITAL | Age: 59
LOS: 1 days | End: 2019-06-05
Payer: COMMERCIAL

## 2019-06-05 ENCOUNTER — APPOINTMENT (OUTPATIENT)
Dept: CT IMAGING | Facility: CLINIC | Age: 59
End: 2019-06-05
Payer: COMMERCIAL

## 2019-06-05 DIAGNOSIS — R19.7 DIARRHEA, UNSPECIFIED: ICD-10-CM

## 2019-06-05 DIAGNOSIS — K58.2 MIXED IRRITABLE BOWEL SYNDROME: ICD-10-CM

## 2019-06-05 DIAGNOSIS — R10.30 LOWER ABDOMINAL PAIN, UNSPECIFIED: ICD-10-CM

## 2019-06-05 LAB
GLIADIN IGA SER QL: <5 UNITS
GLIADIN IGG SER QL: <5 UNITS
GLIADIN PEPTIDE IGA SER-ACNC: NEGATIVE
GLIADIN PEPTIDE IGG SER-ACNC: NEGATIVE
TTG IGA SER IA-ACNC: <1.2 U/ML
TTG IGA SER-ACNC: NEGATIVE
TTG IGG SER IA-ACNC: 1.4 U/ML
TTG IGG SER IA-ACNC: NEGATIVE

## 2019-06-05 PROCEDURE — 74177 CT ABD & PELVIS W/CONTRAST: CPT | Mod: 26

## 2019-06-05 PROCEDURE — 74177 CT ABD & PELVIS W/CONTRAST: CPT

## 2019-06-06 ENCOUNTER — OTHER (OUTPATIENT)
Age: 59
End: 2019-06-06

## 2019-06-06 LAB
C DIFF TOX GENS STL QL NAA+PROBE: NORMAL
CDIFF BY PCR: NOT DETECTED
G LAMBLIA AG STL QL: NORMAL

## 2019-06-07 LAB — DEPRECATED O AND P PREP STL: ABNORMAL

## 2019-06-10 LAB — BACTERIA STL CULT: NORMAL

## 2019-06-27 ENCOUNTER — OUTPATIENT (OUTPATIENT)
Dept: OUTPATIENT SERVICES | Facility: HOSPITAL | Age: 59
LOS: 1 days | End: 2019-06-27
Payer: COMMERCIAL

## 2019-06-27 ENCOUNTER — RESULT REVIEW (OUTPATIENT)
Age: 59
End: 2019-06-27

## 2019-06-27 ENCOUNTER — APPOINTMENT (OUTPATIENT)
Dept: GASTROENTEROLOGY | Facility: GI CENTER | Age: 59
End: 2019-06-27
Payer: COMMERCIAL

## 2019-06-27 DIAGNOSIS — Z87.19 PERSONAL HISTORY OF OTHER DISEASES OF THE DIGESTIVE SYSTEM: ICD-10-CM

## 2019-06-27 PROCEDURE — 45380 COLONOSCOPY AND BIOPSY: CPT

## 2019-06-27 PROCEDURE — 88305 TISSUE EXAM BY PATHOLOGIST: CPT | Mod: 26

## 2019-06-27 PROCEDURE — 88305 TISSUE EXAM BY PATHOLOGIST: CPT

## 2019-06-27 NOTE — REVIEW OF SYSTEMS
[Abdominal Pain] : abdominal pain [Constipation] : constipation [Diarrhea] : diarrhea [Negative] : Psychiatric

## 2019-06-27 NOTE — PROCEDURE
[Bowel Prep Kit] : the patient took the appropriate bowel preparation kit as directed [Approved Diet Followed] : the patient avoided solid foods and adhered to the approved diet list for 24 hours prior to the procedure [Automated Blood Pressure Cuff] : automated blood pressure cuff [Cardiac Monitor] : cardiac monitor [Pulse Oximeter] : pulse oximeter [2] : 2 [Sedation Clearance] : the patient was cleared for moderate sedation [Prep Qualtiy: ___] : Prep Quality:  [unfilled] [Withdrawal Time: ___] : Withdrawal Time:  [unfilled] [Performed By: ___] : Performed by:  AGUILA [Left Lateral Decubitus] : The patient was positioned in the left lateral decubitus position [External Hemorrhoids] : no external hemorrhoids [Abnormal Rectum] : a normal rectum [Cecum (Landmarks/Transillum)] : and guided to the cecum which was identified by the anatomic landmarks of the appendiceal orifice and ileocecal valve and by transillumination in the right lower quadrant [Insufflated] : insufflated [Single Pass Needed] : after a single pass [Retroflex View] : a retroflex view of the rectum was performed [Patient Rotated Into Alternating Positions] : the patient was not rotated [Normal] : Normal [Hemorrhoids] : hemorrhoids [Tolerated Well] : the patient tolerated the procedure well [Sent to Pathology] : was sent to pathology for analysis [Vital Signs Stable] : the vital signs were stable [No Complications] : There were no complications [de-identified] : [id number] [de-identified] :   There was a protruding appendiceal orifice taken ( about 5 mm) . Biopsies taken to r/o mucocele vs polyp) \par     Random biopsies taken of the ascending , transverse , descending , sigmoid and rectum- hx of collagenous colitis [de-identified] : diarrhea , constipatiin

## 2019-06-27 NOTE — PHYSICAL EXAM
[General Appearance - Alert] : alert [General Appearance - In No Acute Distress] : in no acute distress [Sclera] : the sclera and conjunctiva were normal [General Appearance - Well Developed] : well developed [General Appearance - Well Nourished] : well nourished [Outer Ear] : the ears and nose were normal in appearance [Hearing Threshold Finger Rub Not Real] : hearing was normal [Nasal Cavity] : the nasal mucosa and septum were normal [Both Tympanic Membranes Were Examined] : both tympanic membranes were normal [Neck Appearance] : the appearance of the neck was normal [] : no respiratory distress [Respiration, Rhythm And Depth] : normal respiratory rhythm and effort [Auscultation Breath Sounds / Voice Sounds] : lungs were clear to auscultation bilaterally [Exaggerated Use Of Accessory Muscles For Inspiration] : no accessory muscle use [Apical Impulse] : the apical impulse was normal [Heart Rate And Rhythm] : heart rate was normal and rhythm regular [Bowel Sounds] : normal bowel sounds [Abdomen Soft] : soft [Abdomen Tenderness] : non-tender [Skin Color & Pigmentation] : normal skin color and pigmentation [Oriented To Time, Place, And Person] : oriented to person, place, and time [Impaired Insight] : insight and judgment were intact [Affect] : the affect was normal

## 2019-06-27 NOTE — PHYSICAL EXAM
[General Appearance - Alert] : alert [General Appearance - In No Acute Distress] : in no acute distress [General Appearance - Well Developed] : well developed [General Appearance - Well Nourished] : well nourished [Sclera] : the sclera and conjunctiva were normal [Hearing Threshold Finger Rub Not Russell] : hearing was normal [Outer Ear] : the ears and nose were normal in appearance [Both Tympanic Membranes Were Examined] : both tympanic membranes were normal [Nasal Cavity] : the nasal mucosa and septum were normal [Neck Appearance] : the appearance of the neck was normal [] : no respiratory distress [Respiration, Rhythm And Depth] : normal respiratory rhythm and effort [Auscultation Breath Sounds / Voice Sounds] : lungs were clear to auscultation bilaterally [Exaggerated Use Of Accessory Muscles For Inspiration] : no accessory muscle use [Apical Impulse] : the apical impulse was normal [Heart Rate And Rhythm] : heart rate was normal and rhythm regular [Bowel Sounds] : normal bowel sounds [Abdomen Soft] : soft [Skin Color & Pigmentation] : normal skin color and pigmentation [Abdomen Tenderness] : non-tender [Oriented To Time, Place, And Person] : oriented to person, place, and time [Impaired Insight] : insight and judgment were intact [Affect] : the affect was normal

## 2019-06-27 NOTE — REASON FOR VISIT
[Procedure: _________] : a [unfilled] procedure visit [FreeTextEntry1] : collagenous colitis [Colonoscopy] : a colonoscopy [FreeTextEntry2] : collagenous colon

## 2019-06-27 NOTE — PROCEDURE
[Bowel Prep Kit] : the patient took the appropriate bowel preparation kit as directed [Approved Diet Followed] : the patient avoided solid foods and adhered to the approved diet list for 24 hours prior to the procedure [Automated Blood Pressure Cuff] : automated blood pressure cuff [Cardiac Monitor] : cardiac monitor [Pulse Oximeter] : pulse oximeter [2] : 2 [Prep Qualtiy: ___] : Prep Quality:  [unfilled] [Withdrawal Time: ___] : Withdrawal Time:  [unfilled] [Sedation Clearance] : the patient was cleared for moderate sedation [Performed By: ___] : Performed by:  AGUILA [Left Lateral Decubitus] : The patient was positioned in the left lateral decubitus position [Abnormal Rectum] : a normal rectum [External Hemorrhoids] : no external hemorrhoids [Cecum (Landmarks/Transillum)] : and guided to the cecum which was identified by the anatomic landmarks of the appendiceal orifice and ileocecal valve and by transillumination in the right lower quadrant [Single Pass Needed] : after a single pass [Insufflated] : insufflated [Retroflex View] : a retroflex view of the rectum was performed [Patient Rotated Into Alternating Positions] : the patient was not rotated [Normal] : Normal [Hemorrhoids] : hemorrhoids [Tolerated Well] : the patient tolerated the procedure well [Sent to Pathology] : was sent to pathology for analysis [No Complications] : There were no complications [Vital Signs Stable] : the vital signs were stable [de-identified] : diarrhea , constipatiin [de-identified] :   There was a protruding appendiceal orifice taken ( about 5 mm) . Biopsies taken to r/o mucocele vs polyp) \par     Random biopsies taken of the ascending , transverse , descending , sigmoid and rectum- hx of collagenous colitis [de-identified] : [id number]

## 2019-06-27 NOTE — ASSESSMENT
[FreeTextEntry1] : A/P\par protruding appendiceal orifice- polyp vs mucocele. Otherwise , normal appearing mucos. Internal hemorrhoids\par Hx of collagenous colitis. ALternating diarrhea and constipation, abdominal pain\par call in one week for biopsy results\par Pt states tid entocort give constipation but  bid dose give diarrhea and cramping. \par - I suggested  taking TID with entocort with benefiber. If diarrhea with cramping occurs, she may taken Bentyl 20 mg qid prn \par - F/U in office in one month

## 2019-06-28 ENCOUNTER — RX RENEWAL (OUTPATIENT)
Age: 59
End: 2019-06-28

## 2019-06-28 DIAGNOSIS — R10.9 UNSPECIFIED ABDOMINAL PAIN: ICD-10-CM

## 2019-07-03 LAB — SURGICAL PATHOLOGY STUDY: SIGNIFICANT CHANGE UP

## 2019-07-05 ENCOUNTER — OTHER (OUTPATIENT)
Age: 59
End: 2019-07-05

## 2019-07-15 LAB
ALBUMIN SERPL ELPH-MCNC: 4.3 G/DL
ALP BLD-CCNC: 86 U/L
ALT SERPL-CCNC: 16 U/L
ANION GAP SERPL CALC-SCNC: 16 MMOL/L
AST SERPL-CCNC: 15 U/L
BASOPHILS # BLD AUTO: 0.05 K/UL
BASOPHILS NFR BLD AUTO: 0.5 %
BILIRUB SERPL-MCNC: 0.2 MG/DL
BUN SERPL-MCNC: 11 MG/DL
CALCIUM SERPL-MCNC: 9.5 MG/DL
CHLORIDE SERPL-SCNC: 101 MMOL/L
CO2 SERPL-SCNC: 25 MMOL/L
CREAT SERPL-MCNC: 0.77 MG/DL
EOSINOPHIL # BLD AUTO: 0.24 K/UL
EOSINOPHIL NFR BLD AUTO: 2.3 %
GLUCOSE SERPL-MCNC: 87 MG/DL
HCT VFR BLD CALC: 39.8 %
HGB BLD-MCNC: 13.1 G/DL
IMM GRANULOCYTES NFR BLD AUTO: 0.3 %
LYMPHOCYTES # BLD AUTO: 4.15 K/UL
LYMPHOCYTES NFR BLD AUTO: 39.3 %
MAN DIFF?: NORMAL
MCHC RBC-ENTMCNC: 30.8 PG
MCHC RBC-ENTMCNC: 32.9 GM/DL
MCV RBC AUTO: 93.4 FL
MONOCYTES # BLD AUTO: 0.71 K/UL
MONOCYTES NFR BLD AUTO: 6.7 %
NEUTROPHILS # BLD AUTO: 5.38 K/UL
NEUTROPHILS NFR BLD AUTO: 50.9 %
PLATELET # BLD AUTO: 394 K/UL
POTASSIUM SERPL-SCNC: 3.7 MMOL/L
PROT SERPL-MCNC: 7.2 G/DL
RBC # BLD: 4.26 M/UL
RBC # FLD: 12.9 %
SODIUM SERPL-SCNC: 142 MMOL/L
T3 SERPL-MCNC: 132 NG/DL
T4 FREE SERPL-MCNC: 1.6 NG/DL
TSH SERPL-ACNC: 0.11 UIU/ML
WBC # FLD AUTO: 10.56 K/UL

## 2019-07-17 ENCOUNTER — APPOINTMENT (OUTPATIENT)
Dept: ENDOCRINOLOGY | Facility: CLINIC | Age: 59
End: 2019-07-17
Payer: COMMERCIAL

## 2019-07-17 VITALS
WEIGHT: 150 LBS | DIASTOLIC BLOOD PRESSURE: 100 MMHG | HEART RATE: 71 BPM | BODY MASS INDEX: 23.54 KG/M2 | SYSTOLIC BLOOD PRESSURE: 150 MMHG | HEIGHT: 67 IN

## 2019-07-17 PROCEDURE — 99214 OFFICE O/P EST MOD 30 MIN: CPT

## 2019-07-17 NOTE — ASSESSMENT
[Methimazole Therapy] : Risks and benefits of methimazole therapy were discussed with the patient,  including rash, liver dysfunction, and agranulocytosis.  Patient was instructed to call the office for flu-like symptoms eg fever and sore-throat [FreeTextEntry1] : Graves disease \par Tsh slightly suppressed because pt stopped her medication on her won. \par restart MMI 5 mg 2d/week. \par thyroid US shows multiple subcentimeter nodule stable. monitor and repeat US in Nov 2019\par thyroid uptake/scan shows uptake 65 %    \par \par NTMNG : \par f/p FNA benign 2016 \par b/ll subcentimeter nodules. Monitor and repeat US in Nov 2019 \par \par Graves : discussed autoimmunity etiology

## 2019-07-17 NOTE — HISTORY OF PRESENT ILLNESS
[FreeTextEntry1] : follow up  Graves disease. \par she think she is loosing her muscle mass and get soggy skin. \par she has colitis and IBS. \par NTMNG s/p FNA 2014. \par since she started MMi she feels much better with her colitis and she stopped meds for colitis

## 2019-07-18 LAB
CHOLEST SERPL-MCNC: 270 MG/DL
CHOLEST/HDLC SERPL: 5.2 RATIO
HDLC SERPL-MCNC: 52 MG/DL
LDLC SERPL CALC-MCNC: 189 MG/DL
TRIGL SERPL-MCNC: 145 MG/DL

## 2019-07-22 ENCOUNTER — RX RENEWAL (OUTPATIENT)
Age: 59
End: 2019-07-22

## 2019-08-20 ENCOUNTER — LABORATORY RESULT (OUTPATIENT)
Age: 59
End: 2019-08-20

## 2019-08-22 ENCOUNTER — NON-APPOINTMENT (OUTPATIENT)
Age: 59
End: 2019-08-22

## 2019-08-22 ENCOUNTER — APPOINTMENT (OUTPATIENT)
Dept: CARDIOLOGY | Facility: CLINIC | Age: 59
End: 2019-08-22
Payer: COMMERCIAL

## 2019-08-22 VITALS
HEART RATE: 56 BPM | SYSTOLIC BLOOD PRESSURE: 150 MMHG | WEIGHT: 150 LBS | RESPIRATION RATE: 16 BRPM | DIASTOLIC BLOOD PRESSURE: 90 MMHG | BODY MASS INDEX: 23.54 KG/M2 | HEIGHT: 67 IN

## 2019-08-22 PROCEDURE — 93000 ELECTROCARDIOGRAM COMPLETE: CPT

## 2019-08-22 PROCEDURE — 99214 OFFICE O/P EST MOD 30 MIN: CPT

## 2019-08-22 RX ORDER — AMOXICILLIN 875 MG/1
875 TABLET, FILM COATED ORAL
Qty: 20 | Refills: 0 | Status: DISCONTINUED | COMMUNITY
Start: 2019-04-22

## 2019-08-22 RX ORDER — AZELASTINE HYDROCHLORIDE 137 UG/1
137 SPRAY, METERED NASAL
Qty: 30 | Refills: 0 | Status: DISCONTINUED | COMMUNITY
Start: 2019-04-10

## 2019-08-22 RX ORDER — NEBIVOLOL HYDROCHLORIDE 10 MG/1
10 TABLET ORAL
Qty: 90 | Refills: 0 | Status: DISCONTINUED | COMMUNITY
Start: 2019-04-11

## 2019-08-22 RX ORDER — VENLAFAXINE 37.5 MG/1
37.5 TABLET ORAL
Qty: 90 | Refills: 0 | Status: DISCONTINUED | COMMUNITY
Start: 2019-05-31

## 2019-08-22 NOTE — PHYSICAL EXAM
[General Appearance - In No Acute Distress] : no acute distress [General Appearance - Well Developed] : well developed [Normal Oral Mucosa] : normal oral mucosa [Normal Conjunctiva] : the conjunctiva exhibited no abnormalities [Auscultation Breath Sounds / Voice Sounds] : lungs were clear to auscultation bilaterally [] : no respiratory distress [Heart Sounds] : normal S1 and S2 [Murmurs] : no murmurs present [Heart Rate And Rhythm] : heart rate and rhythm were normal [Arterial Pulses Normal] : the arterial pulses were normal [Edema] : no peripheral edema present [Bowel Sounds] : normal bowel sounds [Abnormal Walk] : normal gait [FreeTextEntry1] : No edema [Skin Turgor] : normal skin turgor [Skin Color & Pigmentation] : normal skin color and pigmentation [Oriented To Time, Place, And Person] : oriented to person, place, and time [Affect] : the affect was normal [Mood] : the mood was normal

## 2019-08-22 NOTE — DISCUSSION/SUMMARY
[FreeTextEntry1] : Case and plan discussed with Dr. Pedroza.\par \par 1 - Hypertension:  blood pressure submaximally controlled on current medications.  Stopped valsartan 80mg daily as it was not agreeing with her stomach.  At this time we suggest Dyazide 37.5/25 mg three times a week to start.  Will check BMP in about 2 weeks.  She recently purchased a new home BP monitor which is not working properly.  She will be purchasing new one and will take blood pressures 3 times a week and will log.  Will bring numbers and home BP monitor in for calibration at next visit.  Advised to continue with strict low sodium diet and smoking cessation as she admits to smoking more lately.\par \par 2 - Palpitations:  continues to feel palpitations.  States has not noticed much of a difference with the Bystolic.  Suggested she undergo 24-hour holter monitor, but refused at this time.  Said she will think about it at another time.\par \par 3 - Recent BMP:  potassium 4.4, BUN 11, creatinine 0.84, magnesium 1.7.  Repeat BMP in 2 weeks.\par \par 4 - Follow up with Dr. Wallace in 4-6 weeks.

## 2019-08-22 NOTE — HISTORY OF PRESENT ILLNESS
[FreeTextEntry1] : Mrs. Cruz presents today for follow up evaluation.  Presently she is without complaints of chest pain, lightheadedness or syncope.  She does experience occasional, mild shortness of breath with little to no exertion.  States that she is not active at all and has actually been smoking a lot more than usual.  She is up to 1 -1.5/ppd.  She also continues to have intermittent palpitaitons.  Admits to drinking a lot of Mountain Dew throughout the day.  Was not able tolerate the valsartan 80mg daily that was recently prescribed by Dr. Wallace as it affected her stomach.  She continue to have a lot of issues with her IBS.

## 2019-08-27 ENCOUNTER — RX CHANGE (OUTPATIENT)
Age: 59
End: 2019-08-27

## 2019-08-27 RX ORDER — TRIAMTERENE AND HYDROCHLOROTHIAZIDE 37.5; 25 MG/1; MG/1
37.5-25 CAPSULE ORAL
Qty: 36 | Refills: 1 | Status: DISCONTINUED | COMMUNITY
Start: 2019-08-22 | End: 2019-08-27

## 2019-09-03 ENCOUNTER — RX RENEWAL (OUTPATIENT)
Age: 59
End: 2019-09-03

## 2019-09-16 LAB
T3 SERPL-MCNC: 163 NG/DL
T4 FREE SERPL-MCNC: 1.6 NG/DL
TSH SERPL-ACNC: 0.04 UIU/ML

## 2019-10-09 ENCOUNTER — APPOINTMENT (OUTPATIENT)
Dept: ENDOCRINOLOGY | Facility: CLINIC | Age: 59
End: 2019-10-09
Payer: COMMERCIAL

## 2019-10-09 VITALS
HEIGHT: 67 IN | WEIGHT: 180 LBS | SYSTOLIC BLOOD PRESSURE: 150 MMHG | DIASTOLIC BLOOD PRESSURE: 90 MMHG | BODY MASS INDEX: 28.25 KG/M2 | HEART RATE: 63 BPM

## 2019-10-09 PROCEDURE — 99214 OFFICE O/P EST MOD 30 MIN: CPT

## 2019-10-09 NOTE — HISTORY OF PRESENT ILLNESS
[FreeTextEntry1] : follow up  Graves disease. \par she has colitis and IBS. \par NTMNG s/p FNA 2014. \par since she started MMi she feels much better with her colitis and she stopped meds for colitis

## 2019-10-09 NOTE — ASSESSMENT
[Methimazole Therapy] : Risks and benefits of methimazole therapy were discussed with the patient,  including rash, liver dysfunction, and agranulocytosis.  Patient was instructed to call the office for flu-like symptoms eg fever and sore-throat [FreeTextEntry1] : Graves disease : she stopped her meds again and she developed sob and her pCP restarted MMI 5 mg qd \par check TFts today and adjust dose  \par thyroid US shows multiple subcentimeter nodule stable. monitor and repeat US in Nov 2019\par thyroid uptake/scan shows uptake 65 %    \par she was told not to stop MMI without doctor advice and she still does it and she states each time she was not told to continue meds.\par \par NTMNG : \par f/p FNA benign 2016 \par b/ll subcentimeter nodules. Monitor and repeat US in Nov 2019 \par \par Graves : discussed autoimmunity etiology \par \par HLD : check lipids next appt \par No statin. No CAD

## 2019-10-09 NOTE — PHYSICAL EXAM
[Alert] : alert [Well Nourished] : well nourished [No Acute Distress] : no acute distress [Normal Sclera/Conjunctiva] : normal sclera/conjunctiva [Well Developed] : well developed [EOMI] : extra ocular movement intact [Normal Oropharynx] : the oropharynx was normal [Thyroid Not Enlarged] : the thyroid was not enlarged [No Proptosis] : no proptosis [No Respiratory Distress] : no respiratory distress [No Thyroid Nodules] : there were no palpable thyroid nodules [Normal Rate] : heart rate was normal  [Clear to Auscultation] : lungs were clear to auscultation bilaterally [No Accessory Muscle Use] : no accessory muscle use [Normal S1, S2] : normal S1 and S2 [Regular Rhythm] : with a regular rhythm [Pedal Pulses Normal] : the pedal pulses are present [Normal Bowel Sounds] : normal bowel sounds [No Edema] : there was no peripheral edema [Not Tender] : non-tender [Soft] : abdomen soft [Not Distended] : not distended [Post Cervical Nodes] : posterior cervical nodes [Anterior Cervical Nodes] : anterior cervical nodes [Normal] : normal and non tender [Normal Strength/Tone] : muscle strength and tone were normal [Normal Gait] : normal gait [No Stigmata of Cushings Syndrome] : no stigmata of cushings syndrome [No Rash] : no rash [No Tremors] : no tremors [Oriented x3] : oriented to person, place, and time [Normal Reflexes] : deep tendon reflexes were 2+ and symmetric [Acanthosis Nigricans] : no acanthosis nigricans

## 2019-10-16 ENCOUNTER — RESULT REVIEW (OUTPATIENT)
Age: 59
End: 2019-10-16

## 2019-10-16 LAB
T3 SERPL-MCNC: 89 NG/DL
T4 FREE SERPL-MCNC: 1 NG/DL
TSH SERPL-ACNC: 1.69 UIU/ML

## 2019-10-24 ENCOUNTER — APPOINTMENT (OUTPATIENT)
Dept: PULMONOLOGY | Facility: CLINIC | Age: 59
End: 2019-10-24
Payer: COMMERCIAL

## 2019-10-24 VITALS — BODY MASS INDEX: 23.81 KG/M2 | DIASTOLIC BLOOD PRESSURE: 90 MMHG | SYSTOLIC BLOOD PRESSURE: 135 MMHG | WEIGHT: 152 LBS

## 2019-10-24 VITALS — HEART RATE: 58 BPM | OXYGEN SATURATION: 98 %

## 2019-10-24 VITALS — BODY MASS INDEX: 24.53 KG/M2 | HEIGHT: 66 IN

## 2019-10-24 VITALS — RESPIRATION RATE: 16 BRPM

## 2019-10-24 PROCEDURE — 94010 BREATHING CAPACITY TEST: CPT

## 2019-10-24 PROCEDURE — 99214 OFFICE O/P EST MOD 30 MIN: CPT | Mod: 25

## 2019-10-24 RX ORDER — VALSARTAN 80 MG/1
80 TABLET, COATED ORAL DAILY
Qty: 90 | Refills: 0 | Status: DISCONTINUED | COMMUNITY
Start: 2019-07-22 | End: 2019-10-24

## 2019-10-24 RX ORDER — DICYCLOMINE HYDROCHLORIDE 20 MG/1
20 TABLET ORAL 4 TIMES DAILY
Qty: 120 | Refills: 1 | Status: DISCONTINUED | COMMUNITY
Start: 2019-06-28 | End: 2019-10-24

## 2019-10-24 RX ORDER — BECLOMETHASONE DIPROPIONATE HFA 40 UG/1
40 AEROSOL, METERED RESPIRATORY (INHALATION)
Qty: 11 | Refills: 0 | Status: DISCONTINUED | COMMUNITY
Start: 2019-09-19 | End: 2019-10-24

## 2019-10-24 RX ORDER — SUCRALFATE 1 G/1
1 TABLET ORAL
Qty: 180 | Refills: 1 | Status: DISCONTINUED | COMMUNITY
Start: 2019-03-15 | End: 2019-10-24

## 2019-10-24 RX ORDER — AMOXICILLIN 500 MG/1
500 CAPSULE ORAL
Qty: 21 | Refills: 0 | Status: DISCONTINUED | COMMUNITY
Start: 2019-09-10 | End: 2019-10-24

## 2019-10-24 RX ORDER — TRIAMTERENE AND HYDROCHLOROTHIAZIDE 37.5; 25 MG/1; MG/1
37.5-25 CAPSULE ORAL
Qty: 90 | Refills: 0 | Status: DISCONTINUED | COMMUNITY
Start: 2019-08-27 | End: 2019-10-24

## 2019-10-24 NOTE — ASSESSMENT
[FreeTextEntry1] : The patient probably is short of breath due to chronotropic insufficiency. Her resting heart rate is 58 and probably because of the increased beta blockers she has a blunted heart rate response. She will be seeing cardiology and I suggest adjustment of her medications.\par \par As far as her sleep apnea goes, she would be a good oral appliance candidate when she is done with her current dental work. I told her that when her implants are done she should come back and see me.

## 2019-10-24 NOTE — HISTORY OF PRESENT ILLNESS
[FreeTextEntry1] : The patient stopped using CPAP because she could never find a mask that worked for her. She has not been treating herself for that. She's here today because of increasing shortness of breath. Recently she was thyrotoxic again and was treated with increased amounts of methimazole. Her blood pressure was high and she was given increased doses of bystolic.\par \par She's been complaining of increasing shortness of breath though without cough or wheeze. Sometimes she feels like she "misses a breath".\par \par Currently she is going through significant dental work including implants.

## 2019-10-24 NOTE — PHYSICAL EXAM
[General Appearance - Well Developed] : well developed [Normal Appearance] : normal appearance [Well Groomed] : well groomed [General Appearance - Well Nourished] : well nourished [No Deformities] : no deformities [General Appearance - In No Acute Distress] : no acute distress [Normal Conjunctiva] : the conjunctiva exhibited no abnormalities [Low Lying Soft Palate] : low lying soft palate [Eyelids - No Xanthelasma] : the eyelids demonstrated no xanthelasmas [Elongated Uvula] : elongated uvula [III] : III [Neck Appearance] : the appearance of the neck was normal [Jugular Venous Distention Increased] : there was no jugular-venous distention [Neck Cervical Mass (___cm)] : no neck mass was observed [Thyroid Nodule] : there were no palpable thyroid nodules [Thyroid Diffuse Enlargement] : the thyroid was not enlarged [Heart Rate And Rhythm] : heart rate and rhythm were normal [Heart Sounds] : normal S1 and S2 [Murmurs] : no murmurs present [Exaggerated Use Of Accessory Muscles For Inspiration] : no accessory muscle use [Respiration, Rhythm And Depth] : normal respiratory rhythm and effort [Auscultation Breath Sounds / Voice Sounds] : lungs were clear to auscultation bilaterally [Chest Palpation] : palpation of the chest revealed no abnormalities [Lungs Percussion] : the lungs were normal to percussion [Abdomen Soft] : soft [Abdomen Tenderness] : non-tender [Abdomen Mass (___ Cm)] : no abdominal mass palpated [Nail Clubbing] : no clubbing of the fingernails [Abnormal Walk] : normal gait [Gait - Sufficient For Exercise Testing] : the gait was sufficient for exercise testing [Cyanosis, Localized] : no localized cyanosis [Petechial Hemorrhages (___cm)] : no petechial hemorrhages [Deep Tendon Reflexes (DTR)] : deep tendon reflexes were 2+ and symmetric [Sensation] : the sensory exam was normal to light touch and pinprick [No Focal Deficits] : no focal deficits [Oriented To Time, Place, And Person] : oriented to person, place, and time [Impaired Insight] : insight and judgment were intact [Skin Color & Pigmentation] : normal skin color and pigmentation [Affect] : the affect was normal [Skin Turgor] : normal skin turgor [] : no rash

## 2019-10-29 ENCOUNTER — APPOINTMENT (OUTPATIENT)
Dept: CARDIOLOGY | Facility: CLINIC | Age: 59
End: 2019-10-29
Payer: COMMERCIAL

## 2019-10-29 ENCOUNTER — MEDICATION RENEWAL (OUTPATIENT)
Age: 59
End: 2019-10-29

## 2019-10-29 ENCOUNTER — NON-APPOINTMENT (OUTPATIENT)
Age: 59
End: 2019-10-29

## 2019-10-29 VITALS
BODY MASS INDEX: 25.39 KG/M2 | WEIGHT: 158 LBS | HEART RATE: 53 BPM | HEIGHT: 66 IN | OXYGEN SATURATION: 99 % | RESPIRATION RATE: 16 BRPM | DIASTOLIC BLOOD PRESSURE: 107 MMHG | SYSTOLIC BLOOD PRESSURE: 179 MMHG

## 2019-10-29 PROCEDURE — 93000 ELECTROCARDIOGRAM COMPLETE: CPT

## 2019-10-29 PROCEDURE — 99214 OFFICE O/P EST MOD 30 MIN: CPT

## 2019-10-29 NOTE — HISTORY OF PRESENT ILLNESS
[FreeTextEntry1] : Patient is a 58yo F with HTN, mild carotid disease, MR, GERD, collagenous colitis, RVOT PVC's here for cardiac follow up. Has been having increasing SOB lately, saw pulmonary who thought related to bystolic.  Patient denies PND/orthopnea/edema/palpitations/syncope/claudication. States she has been having GI upset recently related to her colitis she states. HAd been on valsartan and did not tolerate due to GI upset. Was on dyazide in past, states led to fluid retention/bloating. Gets SOB, mostly randomly at rest. Able to go up stairs without SOB. HAs hard time finishing sentences because of SOB. SOB started over the summer. Bystolic increased to 20mg in [place], does help her palps. \par \par ROS: Neuro and  negative

## 2019-10-29 NOTE — HISTORY OF PRESENT ILLNESS
Spoke to patient - he states that before the diarrhea was somewhat better with avoiding trigger foods while taking the immodium. Now, even though he is avoiding trigger foods it is occurring regardless. He had on average 2 to 3 stools (diarrhea) daily for the past 5 days while taking immodium. He would like to try Xifaxin medication that was mentioned by Nurse France. Pt advised that Nurse France is out of the office and I would forward message to on call provider. Pt verbalized understanding.     Nurse France's last clinic viist:     [FreeTextEntry1] : follow up  Graves disease. \par she think she is loosing her muscle mass and get soggy skin. \par she has colitis and IBS. \par NTMNG s/p FNA 2014. \par since she started MMi she feels much better with her colitis and she stopped meds for colitis

## 2019-10-29 NOTE — ASSESSMENT
[FreeTextEntry1] : ECG: SR, diffuse NSST \par \par PHARM NUCLEAR STRESS TEST 5/2019:\par 1. Negative for ischemia, EF 72%\par 2. BP hypertensive at baseline with normal response\par \par ECHO 5/2019:\par 1. Mildly increased LV wall thickness\par 2. Normal LV function, EF 65-70%\par 3. Mildly increased RV thickness\par 4. Trivial pericardial effusion\par \par CAROTID DUPLEX 5/2019\par 1. No hemodynamically significant stenosis\par 2. antegrade flow in vertebral arteries

## 2019-10-29 NOTE — DISCUSSION/SUMMARY
[FreeTextEntry1] : Patient is a 60yo F with HTN, minimal carotid disease, MR, GERD, collagenous colitis, RVOT PVC's here for cardiac follow up. SOB unlikely cardiac related, not exertoinal and comes on sporadically, seems worse when talking. Bystolic does help her palps/PVCs. Symptoms of SOB didn’t start until a few months after increase in in bystolic as well. BP high today, always much higher here than other MD visits or home. Advised further home monitoring and bring in cuff. Can try cutting back bystolic to see if symptoms improve as long as home BP not as high as here in office. \par \par 1. Follow up 6-8 weeks, Patient to monitor blood pressure at home twice per day for 2 weeks prior to follow up visit and bring in readings review and cuff to calibrate \par 2. Can try bystolic 10mg daily at home for a week and see if SOB improves, if not go back to 20mg daily. Would not cut back if home BP elevated however. Some of labile BP related to her colitis\par 3. Recommend aggressive diet and lifestyle modifications \par 4. Recommend 30 minutes moderate intensity aerobic activity 5 days per week as tolerated\par

## 2019-11-20 ENCOUNTER — APPOINTMENT (OUTPATIENT)
Dept: FAMILY MEDICINE | Facility: CLINIC | Age: 59
End: 2019-11-20
Payer: COMMERCIAL

## 2019-11-20 ENCOUNTER — NON-APPOINTMENT (OUTPATIENT)
Age: 59
End: 2019-11-20

## 2019-11-20 VITALS
HEART RATE: 62 BPM | WEIGHT: 155 LBS | RESPIRATION RATE: 12 BRPM | HEIGHT: 66 IN | TEMPERATURE: 97.6 F | BODY MASS INDEX: 24.91 KG/M2 | OXYGEN SATURATION: 98 %

## 2019-11-20 VITALS — DIASTOLIC BLOOD PRESSURE: 90 MMHG | SYSTOLIC BLOOD PRESSURE: 155 MMHG

## 2019-11-20 DIAGNOSIS — M19.219 SECONDARY OSTEOARTHRITIS, UNSPECIFIED SHOULDER: ICD-10-CM

## 2019-11-20 PROCEDURE — 36415 COLL VENOUS BLD VENIPUNCTURE: CPT

## 2019-11-20 PROCEDURE — 99213 OFFICE O/P EST LOW 20 MIN: CPT

## 2019-11-20 PROCEDURE — 81003 URINALYSIS AUTO W/O SCOPE: CPT | Mod: QW

## 2019-11-20 PROCEDURE — 93000 ELECTROCARDIOGRAM COMPLETE: CPT

## 2019-11-20 PROCEDURE — 99214 OFFICE O/P EST MOD 30 MIN: CPT | Mod: 25

## 2019-11-20 RX ORDER — VENLAFAXINE HYDROCHLORIDE 37.5 MG/1
37.5 CAPSULE, EXTENDED RELEASE ORAL
Refills: 0 | Status: DISCONTINUED | COMMUNITY
End: 2019-11-20

## 2019-11-20 NOTE — PHYSICAL EXAM
[No Acute Distress] : no acute distress [Well Nourished] : well nourished [Normal Sclera/Conjunctiva] : normal sclera/conjunctiva [Well-Appearing] : well-appearing [Well Developed] : well developed [PERRL] : pupils equal round and reactive to light [Normal Outer Ear/Nose] : the outer ears and nose were normal in appearance [EOMI] : extraocular movements intact [Normal Oropharynx] : the oropharynx was normal [No JVD] : no jugular venous distention [No Lymphadenopathy] : no lymphadenopathy [Supple] : supple [Thyroid Normal, No Nodules] : the thyroid was normal and there were no nodules present [No Respiratory Distress] : no respiratory distress  [Clear to Auscultation] : lungs were clear to auscultation bilaterally [No Accessory Muscle Use] : no accessory muscle use [Normal Rate] : normal rate  [Regular Rhythm] : with a regular rhythm [Normal S1, S2] : normal S1 and S2 [No Carotid Bruits] : no carotid bruits [No Murmur] : no murmur heard [No Varicosities] : no varicosities [No Abdominal Bruit] : a ~M bruit was not heard ~T in the abdomen [Pedal Pulses Present] : the pedal pulses are present [No Palpable Aorta] : no palpable aorta [No Edema] : there was no peripheral edema [No Extremity Clubbing/Cyanosis] : no extremity clubbing/cyanosis [Soft] : abdomen soft [Non Tender] : non-tender [Non-distended] : non-distended [No HSM] : no HSM [No Masses] : no abdominal mass palpated [Normal Bowel Sounds] : normal bowel sounds [Normal Posterior Cervical Nodes] : no posterior cervical lymphadenopathy [Normal Anterior Cervical Nodes] : no anterior cervical lymphadenopathy [No CVA Tenderness] : no CVA  tenderness [Grossly Normal Strength/Tone] : grossly normal strength/tone [No Spinal Tenderness] : no spinal tenderness [No Joint Swelling] : no joint swelling [Coordination Grossly Intact] : coordination grossly intact [No Rash] : no rash [Deep Tendon Reflexes (DTR)] : deep tendon reflexes were 2+ and symmetric [Normal Gait] : normal gait [No Focal Deficits] : no focal deficits [Normal Insight/Judgement] : insight and judgment were intact [Normal Affect] : the affect was normal

## 2019-11-21 LAB
ALBUMIN SERPL ELPH-MCNC: 4.4 G/DL
ALP BLD-CCNC: 88 U/L
ALT SERPL-CCNC: 16 U/L
ANION GAP SERPL CALC-SCNC: 13 MMOL/L
AST SERPL-CCNC: 15 U/L
BASOPHILS # BLD AUTO: 0.06 K/UL
BASOPHILS NFR BLD AUTO: 0.6 %
BILIRUB SERPL-MCNC: 0.4 MG/DL
BUN SERPL-MCNC: 12 MG/DL
CALCIUM SERPL-MCNC: 9.8 MG/DL
CHLORIDE SERPL-SCNC: 103 MMOL/L
CHOLEST SERPL-MCNC: 293 MG/DL
CHOLEST/HDLC SERPL: 5.3 RATIO
CO2 SERPL-SCNC: 24 MMOL/L
CREAT SERPL-MCNC: 0.86 MG/DL
EOSINOPHIL # BLD AUTO: 0.14 K/UL
EOSINOPHIL NFR BLD AUTO: 1.5 %
GLUCOSE SERPL-MCNC: 83 MG/DL
HCT VFR BLD CALC: 41.6 %
HDLC SERPL-MCNC: 55 MG/DL
HGB BLD-MCNC: 13.5 G/DL
IMM GRANULOCYTES NFR BLD AUTO: 0.3 %
LDLC SERPL CALC-MCNC: 218 MG/DL
LYMPHOCYTES # BLD AUTO: 3.45 K/UL
LYMPHOCYTES NFR BLD AUTO: 36.8 %
MAN DIFF?: NORMAL
MCHC RBC-ENTMCNC: 30.3 PG
MCHC RBC-ENTMCNC: 32.5 GM/DL
MCV RBC AUTO: 93.5 FL
MONOCYTES # BLD AUTO: 0.71 K/UL
MONOCYTES NFR BLD AUTO: 7.6 %
NEUTROPHILS # BLD AUTO: 4.99 K/UL
NEUTROPHILS NFR BLD AUTO: 53.2 %
PLATELET # BLD AUTO: 453 K/UL
POTASSIUM SERPL-SCNC: 4.5 MMOL/L
PROT SERPL-MCNC: 7.4 G/DL
RBC # BLD: 4.45 M/UL
RBC # FLD: 13.9 %
SODIUM SERPL-SCNC: 140 MMOL/L
T4 FREE SERPL-MCNC: 1 NG/DL
TRIGL SERPL-MCNC: 102 MG/DL
TSH SERPL-ACNC: 1.17 UIU/ML
WBC # FLD AUTO: 9.38 K/UL

## 2019-11-25 ENCOUNTER — OTHER (OUTPATIENT)
Age: 59
End: 2019-11-25

## 2019-12-02 ENCOUNTER — MEDICATION RENEWAL (OUTPATIENT)
Age: 59
End: 2019-12-02

## 2019-12-02 ENCOUNTER — RX RENEWAL (OUTPATIENT)
Age: 59
End: 2019-12-02

## 2019-12-06 ENCOUNTER — APPOINTMENT (OUTPATIENT)
Dept: FAMILY MEDICINE | Facility: CLINIC | Age: 59
End: 2019-12-06
Payer: COMMERCIAL

## 2019-12-06 VITALS
TEMPERATURE: 98 F | HEIGHT: 66 IN | BODY MASS INDEX: 24.91 KG/M2 | OXYGEN SATURATION: 97 % | HEART RATE: 71 BPM | WEIGHT: 155 LBS | RESPIRATION RATE: 12 BRPM

## 2019-12-06 VITALS — DIASTOLIC BLOOD PRESSURE: 105 MMHG | SYSTOLIC BLOOD PRESSURE: 165 MMHG

## 2019-12-06 PROCEDURE — 99213 OFFICE O/P EST LOW 20 MIN: CPT

## 2019-12-06 NOTE — ASSESSMENT
[FreeTextEntry1] : WF with cold symptoms however her BP is markedly elevated Pt needs an appointment to get medication revisited. Will prescrib Benicar/hctz 20/12.5 as well as flonase nasal and plain nclaritan/allegra.

## 2019-12-06 NOTE — PHYSICAL EXAM
[No Acute Distress] : no acute distress [Well Developed] : well developed [Well-Appearing] : well-appearing [Well Nourished] : well nourished [PERRL] : pupils equal round and reactive to light [EOMI] : extraocular movements intact [Normal Sclera/Conjunctiva] : normal sclera/conjunctiva [Normal Oropharynx] : the oropharynx was normal [Normal Outer Ear/Nose] : the outer ears and nose were normal in appearance [No JVD] : no jugular venous distention [Thyroid Normal, No Nodules] : the thyroid was normal and there were no nodules present [No Lymphadenopathy] : no lymphadenopathy [Supple] : supple [No Respiratory Distress] : no respiratory distress  [No Accessory Muscle Use] : no accessory muscle use [Normal Rate] : normal rate  [Clear to Auscultation] : lungs were clear to auscultation bilaterally [Regular Rhythm] : with a regular rhythm [Normal S1, S2] : normal S1 and S2 [No Murmur] : no murmur heard [No Carotid Bruits] : no carotid bruits [Pedal Pulses Present] : the pedal pulses are present [No Abdominal Bruit] : a ~M bruit was not heard ~T in the abdomen [No Varicosities] : no varicosities [No Palpable Aorta] : no palpable aorta [No Edema] : there was no peripheral edema [Soft] : abdomen soft [No Extremity Clubbing/Cyanosis] : no extremity clubbing/cyanosis [Non Tender] : non-tender [No Masses] : no abdominal mass palpated [Non-distended] : non-distended [No HSM] : no HSM [Normal Posterior Cervical Nodes] : no posterior cervical lymphadenopathy [Normal Bowel Sounds] : normal bowel sounds [Normal Anterior Cervical Nodes] : no anterior cervical lymphadenopathy [No Spinal Tenderness] : no spinal tenderness [No CVA Tenderness] : no CVA  tenderness [No Joint Swelling] : no joint swelling [Grossly Normal Strength/Tone] : grossly normal strength/tone [No Rash] : no rash [Coordination Grossly Intact] : coordination grossly intact [Normal Gait] : normal gait [No Focal Deficits] : no focal deficits [Deep Tendon Reflexes (DTR)] : deep tendon reflexes were 2+ and symmetric [Normal Affect] : the affect was normal [Normal Insight/Judgement] : insight and judgment were intact

## 2019-12-06 NOTE — HISTORY OF PRESENT ILLNESS
[Cold Symptoms] : cold symptoms [Moderate] : moderate [Congestion] : congestion [Cough] : cough [Sore Throat] : sore throat [Shortness Of Breath] : shortness of breath [Headache] : headache [Fatigue] : fatigue [OTC Remedies] : OTC remedies [Activity] : with activity [In Morning] : in the morning [At Night] : at night [Worsening] : worsening [Wheezing] : no wheezing [Chills] : no chills [Anorexia] : no anorexia [Earache] : no earache [Fever] : no fever [FreeTextEntry1] : throat [de-identified] : pt stated that she is having for a while. [FreeTextEntry5] : for coughing [FreeTextEntry8] : WF for cold cough head congestion no temp yellow sputum 1 week ago.Pt also has elevated /105 will change to benicar/hctz .

## 2019-12-11 ENCOUNTER — NON-APPOINTMENT (OUTPATIENT)
Age: 59
End: 2019-12-11

## 2019-12-12 ENCOUNTER — APPOINTMENT (OUTPATIENT)
Dept: CARDIOLOGY | Facility: CLINIC | Age: 59
End: 2019-12-12
Payer: COMMERCIAL

## 2019-12-12 VITALS
RESPIRATION RATE: 15 BRPM | DIASTOLIC BLOOD PRESSURE: 88 MMHG | BODY MASS INDEX: 24.75 KG/M2 | WEIGHT: 154 LBS | SYSTOLIC BLOOD PRESSURE: 145 MMHG | OXYGEN SATURATION: 96 % | HEIGHT: 66 IN | HEART RATE: 62 BPM

## 2019-12-12 PROCEDURE — 99214 OFFICE O/P EST MOD 30 MIN: CPT

## 2019-12-12 RX ORDER — UBIDECARENONE 100 MG
100 CAPSULE ORAL
Refills: 0 | Status: DISCONTINUED | COMMUNITY
End: 2019-12-12

## 2019-12-12 RX ORDER — BROMPHENIRAMINE MALEATE, PSEUDOEPHEDRINE HYDROCHLORIDE, 2; 30; 10 MG/5ML; MG/5ML; MG/5ML
30-2-10 SYRUP ORAL
Qty: 180 | Refills: 0 | Status: DISCONTINUED | COMMUNITY
Start: 2019-11-05 | End: 2019-12-12

## 2019-12-12 RX ORDER — IPRATROPIUM BROMIDE 21 UG/1
0.03 SPRAY NASAL
Qty: 30 | Refills: 0 | Status: DISCONTINUED | COMMUNITY
Start: 2018-04-23 | End: 2019-12-12

## 2019-12-12 RX ORDER — IBUPROFEN 600 MG/1
600 TABLET, FILM COATED ORAL
Qty: 28 | Refills: 0 | Status: DISCONTINUED | COMMUNITY
Start: 2019-09-10 | End: 2019-12-12

## 2019-12-12 RX ORDER — CHLORHEXIDINE GLUCONATE, 0.12% ORAL RINSE 1.2 MG/ML
0.12 SOLUTION DENTAL
Qty: 473 | Refills: 0 | Status: DISCONTINUED | COMMUNITY
Start: 2019-10-17 | End: 2019-12-12

## 2019-12-12 RX ORDER — LISINOPRIL AND HYDROCHLOROTHIAZIDE TABLETS 20; 12.5 MG/1; MG/1
20-12.5 TABLET ORAL DAILY
Qty: 30 | Refills: 2 | Status: DISCONTINUED | COMMUNITY
Start: 2019-11-20 | End: 2019-12-12

## 2019-12-12 RX ORDER — NEBIVOLOL HYDROCHLORIDE 20 MG/1
20 TABLET ORAL
Qty: 90 | Refills: 2 | Status: DISCONTINUED | COMMUNITY
Start: 2018-02-02 | End: 2019-12-12

## 2019-12-12 RX ORDER — AMLODIPINE BESYLATE 2.5 MG/1
2.5 TABLET ORAL DAILY
Qty: 90 | Refills: 0 | Status: DISCONTINUED | COMMUNITY
Start: 2019-12-02 | End: 2019-12-12

## 2019-12-12 RX ORDER — BENZONATATE 200 MG/1
200 CAPSULE ORAL
Qty: 15 | Refills: 0 | Status: DISCONTINUED | COMMUNITY
Start: 2019-11-03 | End: 2019-12-12

## 2019-12-12 RX ORDER — DOXYCYCLINE HYCLATE 50 MG/1
CAPSULE ORAL
Refills: 0 | Status: DISCONTINUED | COMMUNITY
End: 2019-12-12

## 2019-12-12 NOTE — PHYSICAL EXAM
[General Appearance - Well Developed] : well developed [Normal Conjunctiva] : the conjunctiva exhibited no abnormalities [General Appearance - Well Nourished] : well nourished [Normal Oropharynx] : normal oropharynx [Normal Jugular Venous V Waves Present] : normal jugular venous V waves present [Respiration, Rhythm And Depth] : normal respiratory rhythm and effort [] : no respiratory distress [Auscultation Breath Sounds / Voice Sounds] : lungs were clear to auscultation bilaterally [Heart Sounds] : normal S1 and S2 [Heart Rate And Rhythm] : heart rate and rhythm were normal [Murmurs] : no murmurs present [Bowel Sounds] : normal bowel sounds [Abdomen Soft] : soft [Abnormal Walk] : normal gait [Nail Clubbing] : no clubbing of the fingernails [Abdomen Tenderness] : non-tender [Cyanosis, Localized] : no localized cyanosis [Skin Color & Pigmentation] : normal skin color and pigmentation [Skin Turgor] : normal skin turgor [Affect] : the affect was normal [Oriented To Time, Place, And Person] : oriented to person, place, and time [FreeTextEntry1] : no edema

## 2019-12-12 NOTE — DISCUSSION/SUMMARY
[FreeTextEntry1] : Patient is a 60yo F with HTN, minimal carotid disease, MR, GERD, collagenous colitis, RVOT PVC's here for cardiac follow up. Struggling with BP issues recently, suspect cough related to ACEI but BP better controlled on both lisinopril-hct and now benazepril-HCT. Dizziness previously and now tiredness likely from BP coming down to normal range. She will be seeing PMD next week to re-evaluate BP. Will continue current medication but if cough persists would change to losartan or telmisartan-HTC, did not tolerate valsartan in the past. \par \par 1. Continue Bystolic 10mg daily and benazepril-HTC at current dose. Consider changing to ARB due to cough, will be following with PMD next week\par 2. Severe HLD, will ultimately start statin once BP controlled and tolerating meds to avoid complicating medication regimen should she develop statin side effects\par 3. Recommend aggressive diet and lifestyle modifications \par 4. Recommend 30 minutes moderate intensity aerobic activity 5 days per week as tolerated\par 5. Follow up 3 months \par

## 2019-12-12 NOTE — ASSESSMENT
[FreeTextEntry1] : \par \par PHARM NUCLEAR STRESS TEST 5/2019:\par 1. Negative for ischemia, EF 72%\par 2. BP hypertensive at baseline with normal response\par \par ECHO 5/2019:\par 1. Mildly increased LV wall thickness\par 2. Normal LV function, EF 65-70%\par 3. Mildly increased RV thickness\par 4. Trivial pericardial effusion\par \par CAROTID DUPLEX 5/2019\par 1. No hemodynamically significant stenosis\par 2. antegrade flow in vertebral arteries

## 2019-12-12 NOTE — HISTORY OF PRESENT ILLNESS
[FreeTextEntry1] : Patient is a 60yo F with HTN, mild carotid disease, MR, GERD, collagenous colitis, RVOT PVC's here for cardiac follow up. Has been struggling with HTN and side effects of medications.  HAd been on valsartan and did not tolerate due to GI upset. Was on dyazide in past, states led to fluid retention/bloating.  Got very dizzy on lisinpril-HCT, advised to start amlodipine 2.5mg daily instead but BP not controlled after a couple days and started on benazepril-HCT. Has struggled with cough past few months as well, thinks started after lisinopril and seemed a bit better when stopped. HAs it again now, dry and no sputum   Patient denies PND/orthopnea/edema/palpitations/syncope/claudication. .\par \par ROS: Neuro and  negative

## 2019-12-19 ENCOUNTER — APPOINTMENT (OUTPATIENT)
Dept: FAMILY MEDICINE | Facility: CLINIC | Age: 59
End: 2019-12-19
Payer: COMMERCIAL

## 2019-12-19 VITALS — DIASTOLIC BLOOD PRESSURE: 75 MMHG | SYSTOLIC BLOOD PRESSURE: 140 MMHG | HEART RATE: 72 BPM

## 2019-12-19 VITALS
WEIGHT: 152 LBS | RESPIRATION RATE: 12 BRPM | HEART RATE: 73 BPM | TEMPERATURE: 97.7 F | BODY MASS INDEX: 24.43 KG/M2 | HEIGHT: 66 IN | OXYGEN SATURATION: 95 %

## 2019-12-19 PROCEDURE — 99213 OFFICE O/P EST LOW 20 MIN: CPT

## 2019-12-19 NOTE — PHYSICAL EXAM
[No Acute Distress] : no acute distress [Well Nourished] : well nourished [Well Developed] : well developed [Well-Appearing] : well-appearing [Normal Sclera/Conjunctiva] : normal sclera/conjunctiva [PERRL] : pupils equal round and reactive to light [EOMI] : extraocular movements intact [Normal Outer Ear/Nose] : the outer ears and nose were normal in appearance [Normal Oropharynx] : the oropharynx was normal [No JVD] : no jugular venous distention [Supple] : supple [No Lymphadenopathy] : no lymphadenopathy [No Respiratory Distress] : no respiratory distress  [Thyroid Normal, No Nodules] : the thyroid was normal and there were no nodules present [No Accessory Muscle Use] : no accessory muscle use [Normal Rate] : normal rate  [Clear to Auscultation] : lungs were clear to auscultation bilaterally [Regular Rhythm] : with a regular rhythm [Normal S1, S2] : normal S1 and S2 [No Carotid Bruits] : no carotid bruits [No Murmur] : no murmur heard [No Abdominal Bruit] : a ~M bruit was not heard ~T in the abdomen [No Varicosities] : no varicosities [Pedal Pulses Present] : the pedal pulses are present [No Edema] : there was no peripheral edema [No Extremity Clubbing/Cyanosis] : no extremity clubbing/cyanosis [No Palpable Aorta] : no palpable aorta [Soft] : abdomen soft [Non-distended] : non-distended [Non Tender] : non-tender [No HSM] : no HSM [No Masses] : no abdominal mass palpated [Normal Bowel Sounds] : normal bowel sounds [Normal Posterior Cervical Nodes] : no posterior cervical lymphadenopathy [No CVA Tenderness] : no CVA  tenderness [Normal Anterior Cervical Nodes] : no anterior cervical lymphadenopathy [No Joint Swelling] : no joint swelling [No Spinal Tenderness] : no spinal tenderness [Grossly Normal Strength/Tone] : grossly normal strength/tone [Coordination Grossly Intact] : coordination grossly intact [No Rash] : no rash [No Focal Deficits] : no focal deficits [Normal Gait] : normal gait [Normal Affect] : the affect was normal [Deep Tendon Reflexes (DTR)] : deep tendon reflexes were 2+ and symmetric [Normal Insight/Judgement] : insight and judgment were intact

## 2019-12-19 NOTE — HISTORY OF PRESENT ILLNESS
[FreeTextEntry1] : PT IS HERE FOR BP CHECK UP. [de-identified] : pt here for bp check pt only taking bystolic 5 mg

## 2019-12-20 LAB
ALBUMIN SERPL ELPH-MCNC: 4.3 G/DL
ALP BLD-CCNC: 83 U/L
ALT SERPL-CCNC: 19 U/L
ANION GAP SERPL CALC-SCNC: 14 MMOL/L
AST SERPL-CCNC: 16 U/L
BASOPHILS # BLD AUTO: 0.08 K/UL
BASOPHILS NFR BLD AUTO: 0.8 %
BILIRUB SERPL-MCNC: 0.4 MG/DL
BUN SERPL-MCNC: 12 MG/DL
CALCIUM SERPL-MCNC: 9.7 MG/DL
CHLORIDE SERPL-SCNC: 101 MMOL/L
CO2 SERPL-SCNC: 26 MMOL/L
CREAT SERPL-MCNC: 0.86 MG/DL
EOSINOPHIL # BLD AUTO: 0.3 K/UL
EOSINOPHIL NFR BLD AUTO: 2.8 %
GLUCOSE SERPL-MCNC: 91 MG/DL
HCT VFR BLD CALC: 43.1 %
HGB BLD-MCNC: 14 G/DL
IMM GRANULOCYTES NFR BLD AUTO: 0.3 %
LYMPHOCYTES # BLD AUTO: 3.9 K/UL
LYMPHOCYTES NFR BLD AUTO: 36.7 %
MAN DIFF?: NORMAL
MCHC RBC-ENTMCNC: 30.5 PG
MCHC RBC-ENTMCNC: 32.5 GM/DL
MCV RBC AUTO: 93.9 FL
MONOCYTES # BLD AUTO: 0.79 K/UL
MONOCYTES NFR BLD AUTO: 7.4 %
NEUTROPHILS # BLD AUTO: 5.54 K/UL
NEUTROPHILS NFR BLD AUTO: 52 %
PLATELET # BLD AUTO: 436 K/UL
POTASSIUM SERPL-SCNC: 3.6 MMOL/L
PROT SERPL-MCNC: 7.4 G/DL
RBC # BLD: 4.59 M/UL
RBC # FLD: 13.5 %
SODIUM SERPL-SCNC: 141 MMOL/L
T3 SERPL-MCNC: 88 NG/DL
T4 FREE SERPL-MCNC: 1 NG/DL
TSH SERPL-ACNC: 3.08 UIU/ML
WBC # FLD AUTO: 10.64 K/UL

## 2019-12-23 ENCOUNTER — APPOINTMENT (OUTPATIENT)
Dept: ENDOCRINOLOGY | Facility: CLINIC | Age: 59
End: 2019-12-23
Payer: COMMERCIAL

## 2019-12-23 VITALS
BODY MASS INDEX: 25.07 KG/M2 | DIASTOLIC BLOOD PRESSURE: 72 MMHG | HEART RATE: 70 BPM | WEIGHT: 156 LBS | SYSTOLIC BLOOD PRESSURE: 138 MMHG | HEIGHT: 66 IN

## 2019-12-23 PROCEDURE — 99214 OFFICE O/P EST MOD 30 MIN: CPT

## 2019-12-23 NOTE — HISTORY OF PRESENT ILLNESS
[FreeTextEntry1] : follow up for  Graves disease. \par she has colitis and IBS. \par NTMNG s/p FNA 2014. \par since she started MMi she feels much better with her colitis

## 2019-12-23 NOTE — ASSESSMENT
[Smoking Cessation] : smoking cessation [Methimazole Therapy] : Risks and benefits of methimazole therapy were discussed with the patient,  including rash, liver dysfunction, and agranulocytosis.  Patient was instructed to call the office for flu-like symptoms eg fever and sore-throat [FreeTextEntry1] : Graves disease : \par pt euthyroid clinically and biochemically.\par continue MMI 5 mg qd   \par thyroid US shows multiple subcentimeter nodule stable. monitor and repeat US in Nov 2019\par thyroid uptake/scan shows uptake 65 %    \par monitor TFTs and repeat TFts in 3 mos. \par \par NTMNG : \par f/p FNA benign 2016 \par b/ll subcentimeter nodules. Monitor and repeat US in Nov 2019 \par \par Graves : discussed autoimmunity etiology \par \par HLD : recommended statin but she refuses again She wants to start fish oil. \par No CAD .She has family h/o hyperlipidemia.

## 2019-12-23 NOTE — PHYSICAL EXAM
[No Acute Distress] : no acute distress [Alert] : alert [Well Developed] : well developed [Normal Sclera/Conjunctiva] : normal sclera/conjunctiva [Well Nourished] : well nourished [No Proptosis] : no proptosis [Normal Oropharynx] : the oropharynx was normal [EOMI] : extra ocular movement intact [No Thyroid Nodules] : there were no palpable thyroid nodules [Thyroid Not Enlarged] : the thyroid was not enlarged [No Respiratory Distress] : no respiratory distress [No Accessory Muscle Use] : no accessory muscle use [Clear to Auscultation] : lungs were clear to auscultation bilaterally [Normal S1, S2] : normal S1 and S2 [Normal Rate] : heart rate was normal  [Regular Rhythm] : with a regular rhythm [No Edema] : there was no peripheral edema [Normal Bowel Sounds] : normal bowel sounds [Pedal Pulses Normal] : the pedal pulses are present [Not Tender] : non-tender [Not Distended] : not distended [Soft] : abdomen soft [Normal] : normal and non tender [Anterior Cervical Nodes] : anterior cervical nodes [Post Cervical Nodes] : posterior cervical nodes [Normal Gait] : normal gait [No Stigmata of Cushings Syndrome] : no stigmata of cushings syndrome [No Rash] : no rash [No Tremors] : no tremors [Oriented x3] : oriented to person, place, and time [Acanthosis Nigricans] : no acanthosis nigricans

## 2020-01-21 ENCOUNTER — APPOINTMENT (OUTPATIENT)
Dept: GASTROENTEROLOGY | Facility: CLINIC | Age: 60
End: 2020-01-21
Payer: COMMERCIAL

## 2020-01-21 VITALS
HEIGHT: 66 IN | SYSTOLIC BLOOD PRESSURE: 220 MMHG | DIASTOLIC BLOOD PRESSURE: 100 MMHG | WEIGHT: 152 LBS | RESPIRATION RATE: 16 BRPM | BODY MASS INDEX: 24.43 KG/M2 | HEART RATE: 63 BPM | OXYGEN SATURATION: 98 %

## 2020-01-21 PROCEDURE — 99214 OFFICE O/P EST MOD 30 MIN: CPT

## 2020-01-21 RX ORDER — RANITIDINE 150 MG/1
150 TABLET ORAL DAILY
Qty: 90 | Refills: 1 | Status: DISCONTINUED | COMMUNITY
Start: 2018-10-11 | End: 2020-01-21

## 2020-01-21 NOTE — ASSESSMENT
[FreeTextEntry1] : A/P\par Collagenous colitis- controlled. Biopsies negative last spring . Her cramping and diarrhea were from IBS- D which are currenlty controlled with benefiber and bentyl prn. D/C entocort\par \par GERD\par protonix bid . Will D/C zantac ( recalled ) and add pepcid 40 mg q hs\par F/U in 3 months

## 2020-01-21 NOTE — HISTORY OF PRESENT ILLNESS
[de-identified] : Patient is her in followup for her collagenous colitis, IBS, GERD\par      The patient has a long-standing history of collagenous colitis for many years. About 6 months ago she was having severe diarrhea and abdominal cramping. Stool studies are negative, a CT scan of the abdomen was negative. Colonoscopy was done which showed normal mucosa and biopsies were negative for collagenous colitis. The patient stated that and made her constipated and she was taking one pill every other day. She also has a history of IBS. She states that taking dicyclomine every night cramping is controlled and she takes Benefiber which controls her diarrhea. Her CBC CMP and TSH were normal in December 2019.\par     The patient has a history of GERD for 20 years. Specifically she gets heartburn and regurgitation which can be severe at times. Without medication she gets symptoms everyday. There was good evening. Came last one hour. Currently she is on Protonix 40 mg b.i.d. as well as Zantac 150 mg q.h.s. The most part her symptoms are controlled. Once or twice a month she requires TUMS for breakthrough heartburn.

## 2020-01-21 NOTE — PHYSICAL EXAM
[General Appearance - Alert] : alert [General Appearance - In No Acute Distress] : in no acute distress [General Appearance - Well Developed] : well developed [General Appearance - Well Nourished] : well nourished [Sclera] : the sclera and conjunctiva were normal [Hearing Threshold Finger Rub Not Trinity] : hearing was normal [Outer Ear] : the ears and nose were normal in appearance [Both Tympanic Membranes Were Examined] : both tympanic membranes were normal [Apical Impulse] : the apical impulse was normal [Neck Appearance] : the appearance of the neck was normal [Heart Rate And Rhythm] : heart rate was normal and rhythm regular [Heart Sounds] : normal S1 and S2 [Abdomen Tenderness] : non-tender [Bowel Sounds] : normal bowel sounds [Abdomen Soft] : soft [] : no rash [Skin Turgor] : normal skin turgor [Skin Color & Pigmentation] : normal skin color and pigmentation [Impaired Insight] : insight and judgment were intact [Oriented To Time, Place, And Person] : oriented to person, place, and time [Affect] : the affect was normal

## 2020-01-22 ENCOUNTER — APPOINTMENT (OUTPATIENT)
Dept: FAMILY MEDICINE | Facility: CLINIC | Age: 60
End: 2020-01-22
Payer: COMMERCIAL

## 2020-01-22 VITALS — BODY MASS INDEX: 25.18 KG/M2 | OXYGEN SATURATION: 96 % | WEIGHT: 156 LBS | HEART RATE: 69 BPM | TEMPERATURE: 97.7 F

## 2020-01-22 VITALS — DIASTOLIC BLOOD PRESSURE: 100 MMHG | HEART RATE: 72 BPM | SYSTOLIC BLOOD PRESSURE: 160 MMHG

## 2020-01-22 PROCEDURE — 99213 OFFICE O/P EST LOW 20 MIN: CPT

## 2020-01-22 NOTE — PHYSICAL EXAM
[No Acute Distress] : no acute distress [Well Developed] : well developed [Well Nourished] : well nourished [Well-Appearing] : well-appearing [PERRL] : pupils equal round and reactive to light [Normal Sclera/Conjunctiva] : normal sclera/conjunctiva [Normal Outer Ear/Nose] : the outer ears and nose were normal in appearance [EOMI] : extraocular movements intact [No Lymphadenopathy] : no lymphadenopathy [No JVD] : no jugular venous distention [Normal Oropharynx] : the oropharynx was normal [Thyroid Normal, No Nodules] : the thyroid was normal and there were no nodules present [Supple] : supple [No Respiratory Distress] : no respiratory distress  [No Accessory Muscle Use] : no accessory muscle use [Clear to Auscultation] : lungs were clear to auscultation bilaterally [Normal Rate] : normal rate  [Regular Rhythm] : with a regular rhythm [Normal S1, S2] : normal S1 and S2 [No Carotid Bruits] : no carotid bruits [No Murmur] : no murmur heard [No Abdominal Bruit] : a ~M bruit was not heard ~T in the abdomen [Pedal Pulses Present] : the pedal pulses are present [No Varicosities] : no varicosities [No Edema] : there was no peripheral edema [No Palpable Aorta] : no palpable aorta [Soft] : abdomen soft [Non Tender] : non-tender [No Extremity Clubbing/Cyanosis] : no extremity clubbing/cyanosis [Non-distended] : non-distended [No Masses] : no abdominal mass palpated [Normal Bowel Sounds] : normal bowel sounds [No HSM] : no HSM [Normal Anterior Cervical Nodes] : no anterior cervical lymphadenopathy [Normal Posterior Cervical Nodes] : no posterior cervical lymphadenopathy [No CVA Tenderness] : no CVA  tenderness [No Spinal Tenderness] : no spinal tenderness [No Joint Swelling] : no joint swelling [No Rash] : no rash [Grossly Normal Strength/Tone] : grossly normal strength/tone [Coordination Grossly Intact] : coordination grossly intact [No Focal Deficits] : no focal deficits [Deep Tendon Reflexes (DTR)] : deep tendon reflexes were 2+ and symmetric [Normal Gait] : normal gait [Normal Affect] : the affect was normal [Normal Insight/Judgement] : insight and judgment were intact

## 2020-01-22 NOTE — HISTORY OF PRESENT ILLNESS
[FreeTextEntry1] : pt. is here for b/p check [de-identified] : pt needs BP check only taking bystolic

## 2020-01-23 ENCOUNTER — FORM ENCOUNTER (OUTPATIENT)
Age: 60
End: 2020-01-23

## 2020-01-24 ENCOUNTER — OUTPATIENT (OUTPATIENT)
Dept: OUTPATIENT SERVICES | Facility: HOSPITAL | Age: 60
LOS: 1 days | End: 2020-01-24

## 2020-01-24 ENCOUNTER — APPOINTMENT (OUTPATIENT)
Dept: ULTRASOUND IMAGING | Facility: CLINIC | Age: 60
End: 2020-01-24
Payer: COMMERCIAL

## 2020-01-24 DIAGNOSIS — I10 ESSENTIAL (PRIMARY) HYPERTENSION: ICD-10-CM

## 2020-01-24 PROCEDURE — 93975 VASCULAR STUDY: CPT | Mod: 26

## 2020-01-29 ENCOUNTER — APPOINTMENT (OUTPATIENT)
Dept: FAMILY MEDICINE | Facility: CLINIC | Age: 60
End: 2020-01-29
Payer: COMMERCIAL

## 2020-01-29 VITALS — TEMPERATURE: 97.3 F | HEART RATE: 74 BPM | OXYGEN SATURATION: 97 %

## 2020-01-29 VITALS — DIASTOLIC BLOOD PRESSURE: 92 MMHG | SYSTOLIC BLOOD PRESSURE: 135 MMHG | HEART RATE: 68 BPM

## 2020-01-29 PROCEDURE — 99213 OFFICE O/P EST LOW 20 MIN: CPT

## 2020-01-29 NOTE — PHYSICAL EXAM
[No Acute Distress] : no acute distress [Well Nourished] : well nourished [Well Developed] : well developed [Well-Appearing] : well-appearing [PERRL] : pupils equal round and reactive to light [Normal Sclera/Conjunctiva] : normal sclera/conjunctiva [EOMI] : extraocular movements intact [Normal Outer Ear/Nose] : the outer ears and nose were normal in appearance [Normal Oropharynx] : the oropharynx was normal [No Lymphadenopathy] : no lymphadenopathy [Supple] : supple [No JVD] : no jugular venous distention [Thyroid Normal, No Nodules] : the thyroid was normal and there were no nodules present [No Respiratory Distress] : no respiratory distress  [Clear to Auscultation] : lungs were clear to auscultation bilaterally [No Accessory Muscle Use] : no accessory muscle use [Normal Rate] : normal rate  [Normal S1, S2] : normal S1 and S2 [Regular Rhythm] : with a regular rhythm [No Murmur] : no murmur heard [No Carotid Bruits] : no carotid bruits [No Abdominal Bruit] : a ~M bruit was not heard ~T in the abdomen [No Varicosities] : no varicosities [No Edema] : there was no peripheral edema [Pedal Pulses Present] : the pedal pulses are present [No Extremity Clubbing/Cyanosis] : no extremity clubbing/cyanosis [Soft] : abdomen soft [No Palpable Aorta] : no palpable aorta [Non-distended] : non-distended [Non Tender] : non-tender [No Masses] : no abdominal mass palpated [No HSM] : no HSM [Normal Bowel Sounds] : normal bowel sounds [Normal Anterior Cervical Nodes] : no anterior cervical lymphadenopathy [Normal Posterior Cervical Nodes] : no posterior cervical lymphadenopathy [No CVA Tenderness] : no CVA  tenderness [No Spinal Tenderness] : no spinal tenderness [No Joint Swelling] : no joint swelling [Grossly Normal Strength/Tone] : grossly normal strength/tone [Coordination Grossly Intact] : coordination grossly intact [No Rash] : no rash [No Focal Deficits] : no focal deficits [Normal Affect] : the affect was normal [Normal Gait] : normal gait [Deep Tendon Reflexes (DTR)] : deep tendon reflexes were 2+ and symmetric [Normal Insight/Judgement] : insight and judgment were intact

## 2020-02-04 ENCOUNTER — APPOINTMENT (OUTPATIENT)
Dept: CARDIOLOGY | Facility: CLINIC | Age: 60
End: 2020-02-04
Payer: COMMERCIAL

## 2020-02-04 VITALS
RESPIRATION RATE: 16 BRPM | WEIGHT: 156 LBS | OXYGEN SATURATION: 99 % | HEART RATE: 61 BPM | BODY MASS INDEX: 25.07 KG/M2 | SYSTOLIC BLOOD PRESSURE: 159 MMHG | HEIGHT: 66 IN | DIASTOLIC BLOOD PRESSURE: 92 MMHG

## 2020-02-04 PROCEDURE — 99214 OFFICE O/P EST MOD 30 MIN: CPT

## 2020-02-04 RX ORDER — BENAZEPRIL HYDROCHLORIDE AND HYDROCHLOROTHIAZIDE 20; 12.5 MG/1; MG/1
20-12.5 TABLET, FILM COATED ORAL
Qty: 30 | Refills: 3 | Status: DISCONTINUED | COMMUNITY
Start: 2019-12-06 | End: 2020-02-04

## 2020-02-04 RX ORDER — NADOLOL 20 MG/1
20 TABLET ORAL DAILY
Qty: 30 | Refills: 3 | Status: DISCONTINUED | COMMUNITY
Start: 2019-12-19 | End: 2020-02-04

## 2020-02-04 RX ORDER — HYDRALAZINE HYDROCHLORIDE 25 MG/1
25 TABLET ORAL EVERY 8 HOURS
Qty: 30 | Refills: 0 | Status: DISCONTINUED | COMMUNITY
Start: 2020-01-22 | End: 2020-02-04

## 2020-02-04 RX ORDER — NEBIVOLOL HYDROCHLORIDE 10 MG/1
10 TABLET ORAL DAILY
Qty: 90 | Refills: 1 | Status: DISCONTINUED | COMMUNITY
End: 2020-02-04

## 2020-02-04 RX ORDER — LISINOPRIL 5 MG/1
5 TABLET ORAL DAILY
Qty: 1 | Refills: 3 | Status: DISCONTINUED | COMMUNITY
Start: 2020-01-29 | End: 2020-02-04

## 2020-02-04 NOTE — PHYSICAL EXAM
[General Appearance - Well Developed] : well developed [General Appearance - Well Nourished] : well nourished [Normal Conjunctiva] : the conjunctiva exhibited no abnormalities [Normal Oropharynx] : normal oropharynx [Normal Jugular Venous V Waves Present] : normal jugular venous V waves present [] : no respiratory distress [Respiration, Rhythm And Depth] : normal respiratory rhythm and effort [Auscultation Breath Sounds / Voice Sounds] : lungs were clear to auscultation bilaterally [Heart Sounds] : normal S1 and S2 [Heart Rate And Rhythm] : heart rate and rhythm were normal [Murmurs] : no murmurs present [Bowel Sounds] : normal bowel sounds [Abdomen Tenderness] : non-tender [Abdomen Soft] : soft [Abnormal Walk] : normal gait [Cyanosis, Localized] : no localized cyanosis [Nail Clubbing] : no clubbing of the fingernails [Skin Color & Pigmentation] : normal skin color and pigmentation [Oriented To Time, Place, And Person] : oriented to person, place, and time [Affect] : the affect was normal [Skin Turgor] : normal skin turgor [FreeTextEntry1] : no edema, DP 2+ , reduced capillary refill > 3 seconds

## 2020-02-04 NOTE — DISCUSSION/SUMMARY
[FreeTextEntry1] : Patient is a 60yo F with HTN, minimal carotid disease, MR, GERD, collagenous colitis, RVOT PVC's here for cardiac follow up. Struggling with BP issues recently, suspect cough related to ACEI but BP better controlled on both lisinopril-hct and now benazepril-HCT. Has issues with side effects and tolerating medications. Will try telmisartan in addition to bytolic. Resting HR in low 60s so wont increase beta blocker. BP tough to control mainly related to medication intolerance, will initiate secondary work up to ensure to other causes of accelerated HTN. REnal duplex was done and negative.  \par \par 1. Continue Bystolic 5mg daily, add Telmisartatn 20mg daily. check BMP/Mg as well as cortisol, renin, shey, metanephrines (maybe affected by beta blocker but do not want to hold at this time), PTH. Thyroid function has improved\par 2. Severe HLD, will ultimately start statin once BP controlled and tolerating meds to avoid complicating medication regimen should she develop statin side effects. Has refused statins, consider PCSK 9 inihibitor\par 3. Recommend aggressive diet and lifestyle modifications \par 4. Recommend 30 minutes moderate intensity aerobic activity 5 days per week as tolerated\par 5. Recheck BP in 2 weeks, follow up with me in 6 weeks\par 6. Foot pain as well, good DP pulses but diminished capillary refill. Wlil evaluate for PAD with REVA/LE duplex\par

## 2020-02-04 NOTE — ASSESSMENT
[FreeTextEntry1] : \par RENAL DUPLEX: No evidence stenosis bilaterally \par \par PHARM NUCLEAR STRESS TEST 5/2019:\par 1. Negative for ischemia, EF 72%\par 2. BP hypertensive at baseline with normal response\par \par ECHO 5/2019:\par 1. Mildly increased LV wall thickness\par 2. Normal LV function, EF 65-70%\par 3. Mildly increased RV thickness\par 4. Trivial pericardial effusion\par \par CAROTID DUPLEX 5/2019\par 1. No hemodynamically significant stenosis\par 2. antegrade flow in vertebral arteries

## 2020-02-04 NOTE — HISTORY OF PRESENT ILLNESS
[FreeTextEntry1] : Patient is a 60yo F with HTN, mild carotid disease, MR, GERD, collagenous colitis, RVOT PVC's here for cardiac follow up. Has been struggling with HTN and side effects of medications.  HAd been on valsartan and did not tolerate due to GI upset. Was on dyazide in past, states led to fluid retention/bloating.  Got very dizzy on lisinopril-HCT, advised to start amlodipine 2.5mg daily instead but BP not controlled after a couple days and started on benazepril-HCT.  Coughing continued and changed lisinopril 5mg and cough continued. NOw only on bystolic 5mg daily. HAs not tolerated  Patient denies PND/orthopnea/edema/palpitations/syncope/claudication. .\par \par ROS: Neuro and  negative

## 2020-02-13 DIAGNOSIS — K52.839 MICROSCOPIC COLITIS, UNSPECIFIED: ICD-10-CM

## 2020-02-13 DIAGNOSIS — R19.7 DIARRHEA, UNSPECIFIED: ICD-10-CM

## 2020-02-19 ENCOUNTER — APPOINTMENT (OUTPATIENT)
Dept: CARDIOLOGY | Facility: CLINIC | Age: 60
End: 2020-02-19
Payer: COMMERCIAL

## 2020-02-19 VITALS — HEART RATE: 61 BPM | RESPIRATION RATE: 16 BRPM | SYSTOLIC BLOOD PRESSURE: 185 MMHG | DIASTOLIC BLOOD PRESSURE: 89 MMHG

## 2020-02-19 LAB
ALBUMIN SERPL ELPH-MCNC: 4.3 G/DL
ALDOSTERONE SERUM: 3.8 NG/DL
ALP BLD-CCNC: 87 U/L
ALT SERPL-CCNC: 25 U/L
ANION GAP SERPL CALC-SCNC: 13 MMOL/L
ANION GAP SERPL CALC-SCNC: 14 MMOL/L
AST SERPL-CCNC: 22 U/L
BILIRUB SERPL-MCNC: 0.4 MG/DL
BUN SERPL-MCNC: 12 MG/DL
BUN SERPL-MCNC: 13 MG/DL
CALCIUM SERPL-MCNC: 9.3 MG/DL
CALCIUM SERPL-MCNC: 9.6 MG/DL
CALCIUM SERPL-MCNC: 9.7 MG/DL
CHLORIDE SERPL-SCNC: 101 MMOL/L
CHLORIDE SERPL-SCNC: 101 MMOL/L
CO2 SERPL-SCNC: 26 MMOL/L
CO2 SERPL-SCNC: 27 MMOL/L
CORTIS SERPL-MCNC: 10 UG/DL
CREAT SERPL-MCNC: 0.81 MG/DL
CREAT SERPL-MCNC: 0.85 MG/DL
GLUCOSE SERPL-MCNC: 103 MG/DL
GLUCOSE SERPL-MCNC: 104 MG/DL
MAGNESIUM SERPL-MCNC: 1.9 MG/DL
PARATHYROID HORMONE INTACT: 48 PG/ML
POTASSIUM SERPL-SCNC: 3.6 MMOL/L
POTASSIUM SERPL-SCNC: 3.7 MMOL/L
PROT SERPL-MCNC: 7 G/DL
SODIUM SERPL-SCNC: 141 MMOL/L
SODIUM SERPL-SCNC: 141 MMOL/L

## 2020-02-19 PROCEDURE — 99211 OFF/OP EST MAY X REQ PHY/QHP: CPT

## 2020-02-20 ENCOUNTER — APPOINTMENT (OUTPATIENT)
Dept: FAMILY MEDICINE | Facility: CLINIC | Age: 60
End: 2020-02-20
Payer: COMMERCIAL

## 2020-02-20 VITALS
RESPIRATION RATE: 12 BRPM | HEART RATE: 66 BPM | TEMPERATURE: 97.6 F | WEIGHT: 158 LBS | BODY MASS INDEX: 25.39 KG/M2 | HEIGHT: 66 IN | OXYGEN SATURATION: 98 %

## 2020-02-20 VITALS — DIASTOLIC BLOOD PRESSURE: 95 MMHG | SYSTOLIC BLOOD PRESSURE: 140 MMHG

## 2020-02-20 DIAGNOSIS — Z87.09 PERSONAL HISTORY OF OTHER DISEASES OF THE RESPIRATORY SYSTEM: ICD-10-CM

## 2020-02-20 PROCEDURE — 99213 OFFICE O/P EST LOW 20 MIN: CPT | Mod: 25

## 2020-02-20 PROCEDURE — 87880 STREP A ASSAY W/OPTIC: CPT | Mod: QW

## 2020-02-20 NOTE — ASSESSMENT
[FreeTextEntry1] : To summary on this 49-year-old white female who has come in with throat discomfort she has no temperature she has a history of high blood pressure colitis and other problems which are not being addressed today her blood pressure however was 140/95 she's has seen a cardiologist and he has changed her blood pressure medication on physical examination her blood pressure 140/95 as mentioned her echo was supple with some mild right cervical tenderness her chest was clear her heart had a regular rhythm S1-S2 because of the tenderness in her throat a throat culture was performed which was negative she's been instructed to gargle with salt water take Tylenol for discomfort and continue with his eyes all and a prescription nasal spray that she has name is unknown at this time as mentioned for culture was negative she was once again her course and on stopping smoking as soon as possible she states she is down to 6 cigarettes a day after a last catheter when I had told her to taper down slowly which seemed to work for her however she doesn't seem to get below the 6 cigarettes per day once again she's been told that that's very important and that hopefully she will a vasectomy of

## 2020-02-20 NOTE — HISTORY OF PRESENT ILLNESS
[Moderate] : moderate [___ Days ago] : [unfilled] days ago [Constant] : constant [Cough] : cough [Sore Throat] : sore throat [Headache] : headache [OTC Remedies] : OTC remedies [Worsening] : worsening [Congestion] : no congestion [Chills] : no chills [Wheezing] : no wheezing [Earache] : no earache [Shortness Of Breath] : no shortness of breath [Anorexia] : no anorexia [de-identified] : yellow phlegm [Fatigue] : not fatigue [Fever] : no fever [FreeTextEntry5] : nasal spray,syrup [FreeTextEntry1] : 5 days ago [FreeTextEntry8] : Sore throat and cough for 5 days no temp. smoker.Still smokes 6 cig per day.

## 2020-02-24 ENCOUNTER — RX RENEWAL (OUTPATIENT)
Age: 60
End: 2020-02-24

## 2020-02-24 LAB
AMYLASE/CREAT SERPL: 37 U/L
BASOPHILS # BLD AUTO: 0.1 K/UL
BASOPHILS NFR BLD AUTO: 1 %
EOSINOPHIL # BLD AUTO: 0.34 K/UL
EOSINOPHIL NFR BLD AUTO: 3.4 %
HCT VFR BLD CALC: 44.8 %
HGB BLD-MCNC: 13.9 G/DL
IMM GRANULOCYTES NFR BLD AUTO: 0.4 %
LPL SERPL-CCNC: 25 U/L
LYMPHOCYTES # BLD AUTO: 3.63 K/UL
LYMPHOCYTES NFR BLD AUTO: 36.6 %
MAN DIFF?: NORMAL
MCHC RBC-ENTMCNC: 30.2 PG
MCHC RBC-ENTMCNC: 31 GM/DL
MCV RBC AUTO: 97.2 FL
MONOCYTES # BLD AUTO: 0.71 K/UL
MONOCYTES NFR BLD AUTO: 7.2 %
NEUTROPHILS # BLD AUTO: 5.11 K/UL
NEUTROPHILS NFR BLD AUTO: 51.4 %
PLATELET # BLD AUTO: 400 K/UL
RBC # BLD: 4.61 M/UL
RBC # FLD: 13.3 %
WBC # FLD AUTO: 9.93 K/UL

## 2020-02-25 ENCOUNTER — APPOINTMENT (OUTPATIENT)
Dept: FAMILY MEDICINE | Facility: CLINIC | Age: 60
End: 2020-02-25

## 2020-02-25 LAB
METANEPHRINE, PL: 28 PG/ML
NORMETANEPHRINE, PL: 196 PG/ML

## 2020-02-27 ENCOUNTER — APPOINTMENT (OUTPATIENT)
Dept: CARDIOLOGY | Facility: CLINIC | Age: 60
End: 2020-02-27
Payer: COMMERCIAL

## 2020-02-27 PROCEDURE — 93923 UPR/LXTR ART STDY 3+ LVLS: CPT

## 2020-03-03 LAB — RENIN ACTIVITY, PLASMA: <0.167 NG/ML/HR

## 2020-03-06 ENCOUNTER — NON-APPOINTMENT (OUTPATIENT)
Age: 60
End: 2020-03-06

## 2020-03-06 ENCOUNTER — APPOINTMENT (OUTPATIENT)
Dept: CARDIOLOGY | Facility: CLINIC | Age: 60
End: 2020-03-06
Payer: COMMERCIAL

## 2020-03-06 VITALS
DIASTOLIC BLOOD PRESSURE: 80 MMHG | HEIGHT: 67 IN | WEIGHT: 153 LBS | HEART RATE: 61 BPM | SYSTOLIC BLOOD PRESSURE: 140 MMHG | BODY MASS INDEX: 24.01 KG/M2 | RESPIRATION RATE: 14 BRPM

## 2020-03-06 PROCEDURE — 93000 ELECTROCARDIOGRAM COMPLETE: CPT

## 2020-03-06 PROCEDURE — 99214 OFFICE O/P EST MOD 30 MIN: CPT

## 2020-03-06 NOTE — PHYSICAL EXAM
[General Appearance - Well Developed] : well developed [General Appearance - Well Nourished] : well nourished [Normal Conjunctiva] : the conjunctiva exhibited no abnormalities [Normal Oropharynx] : normal oropharynx [Normal Jugular Venous V Waves Present] : normal jugular venous V waves present [] : no respiratory distress [Respiration, Rhythm And Depth] : normal respiratory rhythm and effort [Auscultation Breath Sounds / Voice Sounds] : lungs were clear to auscultation bilaterally [Heart Rate And Rhythm] : heart rate and rhythm were normal [Heart Sounds] : normal S1 and S2 [Murmurs] : no murmurs present [Bowel Sounds] : normal bowel sounds [Abdomen Soft] : soft [Abdomen Tenderness] : non-tender [Abnormal Walk] : normal gait [Nail Clubbing] : no clubbing of the fingernails [Cyanosis, Localized] : no localized cyanosis [Skin Color & Pigmentation] : normal skin color and pigmentation [Skin Turgor] : normal skin turgor [Oriented To Time, Place, And Person] : oriented to person, place, and time [Affect] : the affect was normal [FreeTextEntry1] : no edema, DP 2+ , reduced capillary refill > 3 seconds

## 2020-03-06 NOTE — HISTORY OF PRESENT ILLNESS
[FreeTextEntry1] : Patient is a 60yo F with HTN, mild carotid disease, MR, GERD, collagenous colitis, RVOT PVC's here for cardiac follow up. Has been struggling with HTN and side effects of medications recently.  HAd been on valsartan and did not tolerate due to GI upset. Was on dyazide in past, states led to fluid retention/bloating.  Got very dizzy on lisinopril-HCT, advised to start amlodipine 2.5mg daily instead but BP not controlled after a couple days and started on benazepril-HCT.  Coughing continued and changed lisinopril 5mg and cough continued. \par \par Currently taking Bystolic 5mg daily and Telmisartan 80mg daily. Prescribed clonidine to be taken as needed for spikes in BP above 190. Past few days has been taking 0.1mg twice per day past severael days. She states salt increases her BP as well. \par \par ROS: Neuro and  negative

## 2020-03-06 NOTE — ASSESSMENT
[FreeTextEntry1] : ECG: SR, NSST \par \par  HDL 55   (2/2020)\par CMP/Mg/amylase/lipase unremarkable, glucose 108\par Serum normetaneprhine 196 (upper limit  normal 146), metanephrine 28 (normal) \par Renin Activity Plasma < 0.167\par TSH 2.32\par Cortisol 10\par Calcium 9.6, PTH 48\par Aldosterone 3.8 \par \par \par RENAL DUPLEX 1/2020: No evidence stenosis bilaterally \par \par REVA/PVR 2/2020:\par 1. R 0.97 L 1.03\par 2. Normal PVR\par \par PHARM NUCLEAR STRESS TEST 5/2019:\par 1. Negative for ischemia, EF 72%\par 2. BP hypertensive at baseline with normal response\par \par ECHO 5/2019:\par 1. Mildly increased LV wall thickness\par 2. Normal LV function, EF 65-70%\par 3. Mildly increased RV thickness\par 4. Trivial pericardial effusion\par \par CAROTID DUPLEX 5/2019\par 1. No hemodynamically significant stenosis\par 2. antegrade flow in vertebral arteries

## 2020-03-06 NOTE — DISCUSSION/SUMMARY
[FreeTextEntry1] : Patient is a 60yo F with HTN, minimal carotid disease, MR, GERD, collagenous colitis, RVOT PVC's here for cardiac follow up. HAving difficult to control accelerated H TN recently. Secondary work up with equivocal findings. May just have low renin HTN and very salt sensitive. Normetanephrines minimally elevated, has been on beta blocker. NO episodes of paroxysmal HA/flushing/palps, will not plan on MRI adrenals at this time. \par \par 1. Continue Bystolic/telmisartan. Advised taking clonidine 0.1mg bid regularly. Will let me know if side effects as need to wean off if doesn’t tolerate\par 2. Has refused statins, consider PCSK 9 inihibitor. Will discuss further once BP better controlled\par 3. Recommend aggressive diet and lifestyle modifications , very low salt diet (2gm or less) \par 4. Recommend 30 minutes moderate intensity aerobic activity 5 days per week as tolerated\par 5. Normal REVA/PVR, leg pain not from PAD\par 6. Follow up 3 months\par 7. Regular endocrine, GI and PMD follow up

## 2020-03-18 ENCOUNTER — APPOINTMENT (OUTPATIENT)
Dept: ENDOCRINOLOGY | Facility: CLINIC | Age: 60
End: 2020-03-18

## 2020-03-18 ENCOUNTER — APPOINTMENT (OUTPATIENT)
Dept: CARDIOLOGY | Facility: CLINIC | Age: 60
End: 2020-03-18

## 2020-05-07 ENCOUNTER — RESULT CHARGE (OUTPATIENT)
Age: 60
End: 2020-05-07

## 2020-05-12 ENCOUNTER — APPOINTMENT (OUTPATIENT)
Dept: CARDIOLOGY | Facility: CLINIC | Age: 60
End: 2020-05-12
Payer: COMMERCIAL

## 2020-05-12 PROCEDURE — 99442: CPT

## 2020-05-12 NOTE — DISCUSSION/SUMMARY
[FreeTextEntry1] : Patient is a 58yo F with HTN, minimal carotid disease, MR, GERD, collagenous colitis, RVOT PVC's here for cardiac follow up. HAving difficult to control accelerated H TN recently. Secondary work up with equivocal findings. May just have low renin HTN and very salt sensitive. Normetanephrines minimally elevated, has been on beta blocker. Low suspicion for pheo as no symptoms.  \par \par 1. Continue Bystolic/telmisartan/clonidin. BP check in PMD office, will have her come in in june to check \par 2. Has refused statins, consider PCSK 9 inihibitor. Will discuss further once BP better controlled\par 3. Recommend aggressive diet and lifestyle modifications , very low salt diet (2gm or less) \par 4. Recommend 30 minutes moderate intensity aerobic activity 5 days per week as tolerated\par 5. Regular endocrine, GI and PMD follow up \par 6. Cut back sugar and caffeine in diet

## 2020-05-12 NOTE — HISTORY OF PRESENT ILLNESS
[Home] : at home, [unfilled] , at the time of the visit. [Other Location: e.g. Home (Enter Location, City,State)___] : at [unfilled] [Patient] : the patient [Self] : self [FreeTextEntry2] : Sultana Cruz [FreeTextEntry1] : Patient is a 58yo F with HTN, mild carotid disease, MR, GERD, collagenous colitis, RVOT PVC's here for cardiac telehealth follow up. Has been struggling with HTN and side effects of medications recently.  HAd been on valsartan and did not tolerate due to GI upset. Was on dyazide in past, states led to fluid retention/bloating.  Got very dizzy on lisinopril-HCT, advised to start amlodipine 2.5mg daily instead but BP not controlled after a couple days and started on benazepril-HCT.  Coughing continued and changed lisinopril 5mg and cough continued. \par \par Currently taking Bystolic 5mg daily and Telmisartan 80mg daily, also put on clonidine to take on regular basis. BP has been high recently. Has been smoking more lately and not exercising. BP recently has been up to 200/140. Patient denies PND/orthopnea/edema/palpitations/syncope/claudication. No CP/SOB. WEight is down she states. \par \par Visit completed as telephonic due to technical difficulties with video conferencing. \par ROS: Neuro and  negative

## 2020-05-12 NOTE — ASSESSMENT
[FreeTextEntry1] : \par \par  HDL 55   (2/2020)\par CMP/Mg/amylase/lipase unremarkable, glucose 108\par Serum normetaneprhine 196 (upper limit  normal 146), metanephrine 28 (normal) \par Renin Activity Plasma < 0.167\par TSH 2.32\par Cortisol 10\par Calcium 9.6, PTH 48\par Aldosterone 3.8 \par \par \par RENAL DUPLEX 1/2020: No evidence stenosis bilaterally \par \par REVA/PVR 2/2020:\par 1. R 0.97 L 1.03\par 2. Normal PVR\par \par PHARM NUCLEAR STRESS TEST 5/2019:\par 1. Negative for ischemia, EF 72%\par 2. BP hypertensive at baseline with normal response\par \par ECHO 5/2019:\par 1. Mildly increased LV wall thickness\par 2. Normal LV function, EF 65-70%\par 3. Mildly increased RV thickness\par 4. Trivial pericardial effusion\par \par CAROTID DUPLEX 5/2019\par 1. No hemodynamically significant stenosis\par 2. antegrade flow in vertebral arteries

## 2020-06-09 ENCOUNTER — APPOINTMENT (OUTPATIENT)
Dept: GASTROENTEROLOGY | Facility: CLINIC | Age: 60
End: 2020-06-09

## 2020-06-09 ENCOUNTER — APPOINTMENT (OUTPATIENT)
Dept: CARDIOLOGY | Facility: CLINIC | Age: 60
End: 2020-06-09
Payer: COMMERCIAL

## 2020-06-09 ENCOUNTER — NON-APPOINTMENT (OUTPATIENT)
Age: 60
End: 2020-06-09

## 2020-06-09 VITALS
HEIGHT: 67 IN | TEMPERATURE: 98.3 F | RESPIRATION RATE: 16 BRPM | OXYGEN SATURATION: 97 % | DIASTOLIC BLOOD PRESSURE: 98 MMHG | WEIGHT: 156 LBS | BODY MASS INDEX: 24.48 KG/M2 | HEART RATE: 64 BPM | SYSTOLIC BLOOD PRESSURE: 181 MMHG

## 2020-06-09 PROCEDURE — 99214 OFFICE O/P EST MOD 30 MIN: CPT

## 2020-06-09 PROCEDURE — 93000 ELECTROCARDIOGRAM COMPLETE: CPT

## 2020-06-09 NOTE — ASSESSMENT
[FreeTextEntry1] : ECG: SR, NSST \par \par  HDL 55   (2/2020)\par CMP/Mg/amylase/lipase unremarkable, glucose 108\par Serum normetaneprhine 196 (upper limit  normal 146), metanephrine 28 (normal) \par Renin Activity Plasma < 0.167\par TSH 2.32\par Cortisol 10\par Calcium 9.6, PTH 48\par Aldosterone 3.8 \par \par RENAL DUPLEX 1/2020: No evidence stenosis bilaterally \par \par REVA/PVR 2/2020:\par 1. R 0.97 L 1.03\par 2. Normal PVR\par \par PHARM NUCLEAR STRESS TEST 5/2019:\par 1. Negative for ischemia, EF 72%\par 2. BP hypertensive at baseline with normal response\par \par ECHO 5/2019:\par 1. Mildly increased LV wall thickness\par 2. Normal LV function, EF 65-70%\par 3. Mildly increased RV thickness\par 4. Trivial pericardial effusion\par \par CAROTID DUPLEX 5/2019\par 1. No hemodynamically significant stenosis\par 2. antegrade flow in vertebral arteries

## 2020-06-09 NOTE — DISCUSSION/SUMMARY
[FreeTextEntry1] : Patient is a 58yo F with HTN, minimal carotid disease, MR, GERD, collagenous colitis, RVOT PVC's here for cardiac follow up. HAving difficult to control accelerated H TN recently. Secondary work up with equivocal findings. May just have low renin HTN and very salt sensitive. \par \par Normetanephrines minimally elevated, has been on beta blocker. MOre recently developed flushing, will arrange MRI to evaluate for pheo. BP has been better controlled when takes clonidine regularly. \par \par 1. Continue Bystolic/telmisartan. Re-enforced need to take clonidine 0.1mg po bid regularly as BP better controlled when did this\par 2. Has refused statins, consider PCSK 9 inihibitor. \par 3. Recommend aggressive diet and lifestyle modifications , very low salt diet (2gm or less) \par 4. Recommend 30 minutes moderate intensity aerobic activity 5 days per week as tolerated\par 5. Echo prior to follow up in 2 months to evaluate MOORE\par 6. MRI abdomen to eval for pheochromocytoma

## 2020-06-09 NOTE — HISTORY OF PRESENT ILLNESS
[FreeTextEntry1] : Patient is a 58yo F with HTN, mild carotid disease, MR, GERD, collagenous colitis, RVOT PVC's here for cardiac follow up. Has been struggling with HTN and side effects of medications recently.  HAd been on valsartan and did not tolerate due to GI upset. Was on dyazide in past, states led to fluid retention/bloating.  Got very dizzy on lisinopril-HCT, advised to start amlodipine 2.5mg daily instead but BP not controlled after a couple days and started on benazepril-HCT.  Coughing continued and changed lisinopril 5mg and cough continued. \par \par Currently taking Bystolic 5mg daily and Telmisartan 80mg daily. Prescribed clonidine to be taken as needed for spikes in BP above 190 and ultimately started taking regularly to control BP. Telehealth visit last month she noted increasing in BP again. ADvised get in to PMD office to have it checked but too nervous to go in. Feeling tired.  Continues to smoke and has been doing so more lately. Gets a bit SOB with exertion. ALso flushing now occurring at night time.\par \par ROS: Neuro and  negative

## 2020-06-30 ENCOUNTER — APPOINTMENT (OUTPATIENT)
Dept: CARDIOLOGY | Facility: CLINIC | Age: 60
End: 2020-06-30
Payer: COMMERCIAL

## 2020-06-30 PROCEDURE — 93306 TTE W/DOPPLER COMPLETE: CPT

## 2020-07-14 ENCOUNTER — APPOINTMENT (OUTPATIENT)
Dept: ENDOCRINOLOGY | Facility: CLINIC | Age: 60
End: 2020-07-14
Payer: COMMERCIAL

## 2020-07-14 VITALS
DIASTOLIC BLOOD PRESSURE: 92 MMHG | HEART RATE: 68 BPM | HEIGHT: 67 IN | SYSTOLIC BLOOD PRESSURE: 172 MMHG | BODY MASS INDEX: 24.01 KG/M2 | WEIGHT: 153 LBS

## 2020-07-14 PROCEDURE — 99214 OFFICE O/P EST MOD 30 MIN: CPT

## 2020-07-14 RX ORDER — MELOXICAM 7.5 MG/1
7.5 TABLET ORAL
Refills: 0 | Status: DISCONTINUED | COMMUNITY
Start: 2018-04-02 | End: 2020-07-14

## 2020-07-14 RX ORDER — SUCRALFATE 1 G/1
1 TABLET ORAL 4 TIMES DAILY
Qty: 360 | Refills: 1 | Status: DISCONTINUED | COMMUNITY
Start: 2020-05-20 | End: 2020-07-14

## 2020-07-14 RX ORDER — MELOXICAM 15 MG/1
15 TABLET ORAL
Qty: 30 | Refills: 2 | Status: DISCONTINUED | COMMUNITY
Start: 2019-11-20 | End: 2020-07-14

## 2020-07-14 NOTE — PHYSICAL EXAM
[Alert] : alert [Well Nourished] : well nourished [Well Developed] : well developed [No Acute Distress] : no acute distress [Normal Sclera/Conjunctiva] : normal sclera/conjunctiva [No Proptosis] : no proptosis [EOMI] : extra ocular movement intact [Normal Oropharynx] : the oropharynx was normal [No Respiratory Distress] : no respiratory distress [No Thyroid Nodules] : no palpable thyroid nodules [Thyroid Not Enlarged] : the thyroid was not enlarged [No Accessory Muscle Use] : no accessory muscle use [Clear to Auscultation] : lungs were clear to auscultation bilaterally [Normal S1, S2] : normal S1 and S2 [Regular Rhythm] : with a regular rhythm [Normal Rate] : heart rate was normal [Pedal Pulses Normal] : the pedal pulses are present [No Edema] : no peripheral edema [Not Distended] : not distended [Not Tender] : non-tender [Normal Bowel Sounds] : normal bowel sounds [Soft] : abdomen soft [Normal Anterior Cervical Nodes] : no anterior cervical lymphadenopathy [Normal Posterior Cervical Nodes] : no posterior cervical lymphadenopathy [No Stigmata of Cushings Syndrome] : no stigmata of Cushings Syndrome [Spine Straight] : spine straight [Normal Gait] : normal gait [No Rash] : no rash [Oriented x3] : oriented to person, place, and time [No Tremors] : no tremors [Acanthosis Nigricans] : no acanthosis nigricans

## 2020-07-14 NOTE — ASSESSMENT
[Smoking Cessation] : smoking cessation [FreeTextEntry1] : Hyperthyroidism secondary to Graves disease in patient who adjust treatments on her own and she possibly had \par CVA last week and she signed AMA and she had hypertensive emergency while admitted \par in [facility]. She thinks MMI helps all her conditions including her HTN. \par pt euthyroid clinically and biochemically.\par she was told to stop MMI in Sept 2019 since she became euthyroid \par continue to monitor tfts \par monitor TFTs and repeat TFts in 3 mos. \par \par NTMNG : \par f/p FNA benign 2016 \par b/ll subcentimeter nodules. Monitor and repeat US \par \par HTN : she was seen in hospital with HTN emergency with possibly CVA. \par Advised to see PCP as soon as possible to monitor BP considering recent history.\par Patient extremely resistant and dismissive to suggestions of new treatment.  \par \par HLD : recommended statin but she refuses again.  \par She has family h/o hyperlipidemia. \par start crestor 10 mg qd. \par repeat lipids in 3mos.

## 2020-07-16 ENCOUNTER — APPOINTMENT (OUTPATIENT)
Dept: FAMILY MEDICINE | Facility: CLINIC | Age: 60
End: 2020-07-16

## 2020-08-03 ENCOUNTER — APPOINTMENT (OUTPATIENT)
Dept: FAMILY MEDICINE | Facility: CLINIC | Age: 60
End: 2020-08-03
Payer: COMMERCIAL

## 2020-08-03 VITALS — HEART RATE: 69 BPM | OXYGEN SATURATION: 98 % | TEMPERATURE: 98.3 F

## 2020-08-03 VITALS — SYSTOLIC BLOOD PRESSURE: 140 MMHG | HEART RATE: 64 BPM | DIASTOLIC BLOOD PRESSURE: 92 MMHG

## 2020-08-03 PROCEDURE — 99213 OFFICE O/P EST LOW 20 MIN: CPT

## 2020-08-03 NOTE — HISTORY OF PRESENT ILLNESS
[FreeTextEntry1] : pt. here for check up [de-identified] : pt in [facility] 1 month prior for R/O cva and hypertension having reaction with meds walked out

## 2020-08-03 NOTE — PHYSICAL EXAM
[No Acute Distress] : no acute distress [Well Nourished] : well nourished [Well Developed] : well developed [PERRL] : pupils equal round and reactive to light [Well-Appearing] : well-appearing [Normal Sclera/Conjunctiva] : normal sclera/conjunctiva [Normal Outer Ear/Nose] : the outer ears and nose were normal in appearance [EOMI] : extraocular movements intact [No JVD] : no jugular venous distention [No Lymphadenopathy] : no lymphadenopathy [Normal Oropharynx] : the oropharynx was normal [Supple] : supple [Thyroid Normal, No Nodules] : the thyroid was normal and there were no nodules present [No Accessory Muscle Use] : no accessory muscle use [No Respiratory Distress] : no respiratory distress  [Normal Rate] : normal rate  [Clear to Auscultation] : lungs were clear to auscultation bilaterally [Regular Rhythm] : with a regular rhythm [Normal S1, S2] : normal S1 and S2 [No Murmur] : no murmur heard [No Carotid Bruits] : no carotid bruits [No Abdominal Bruit] : a ~M bruit was not heard ~T in the abdomen [Pedal Pulses Present] : the pedal pulses are present [No Varicosities] : no varicosities [No Edema] : there was no peripheral edema [Soft] : abdomen soft [No Palpable Aorta] : no palpable aorta [No Extremity Clubbing/Cyanosis] : no extremity clubbing/cyanosis [No Masses] : no abdominal mass palpated [Non-distended] : non-distended [Non Tender] : non-tender [No HSM] : no HSM [Normal Posterior Cervical Nodes] : no posterior cervical lymphadenopathy [Normal Bowel Sounds] : normal bowel sounds [No Spinal Tenderness] : no spinal tenderness [Normal Anterior Cervical Nodes] : no anterior cervical lymphadenopathy [No CVA Tenderness] : no CVA  tenderness [Grossly Normal Strength/Tone] : grossly normal strength/tone [No Joint Swelling] : no joint swelling [No Rash] : no rash [Coordination Grossly Intact] : coordination grossly intact [No Focal Deficits] : no focal deficits [Normal Gait] : normal gait [Normal Insight/Judgement] : insight and judgment were intact [Normal Affect] : the affect was normal

## 2020-08-04 ENCOUNTER — APPOINTMENT (OUTPATIENT)
Dept: CARDIOLOGY | Facility: CLINIC | Age: 60
End: 2020-08-04

## 2020-08-05 LAB
CHOLEST SERPL-MCNC: 153 MG/DL
CHOLEST/HDLC SERPL: 3.1 RATIO
HDLC SERPL-MCNC: 50 MG/DL
LDLC SERPL CALC-MCNC: 62 MG/DL
TRIGL SERPL-MCNC: 207 MG/DL

## 2020-08-12 ENCOUNTER — APPOINTMENT (OUTPATIENT)
Dept: FAMILY MEDICINE | Facility: CLINIC | Age: 60
End: 2020-08-12
Payer: COMMERCIAL

## 2020-08-12 VITALS — RESPIRATION RATE: 14 BRPM | HEART RATE: 66 BPM | TEMPERATURE: 97.5 F | OXYGEN SATURATION: 98 %

## 2020-08-12 VITALS — SYSTOLIC BLOOD PRESSURE: 135 MMHG | HEART RATE: 72 BPM | DIASTOLIC BLOOD PRESSURE: 90 MMHG

## 2020-08-12 DIAGNOSIS — H83.09 LABYRINTHITIS, UNSPECIFIED EAR: ICD-10-CM

## 2020-08-12 PROCEDURE — 36415 COLL VENOUS BLD VENIPUNCTURE: CPT

## 2020-08-12 PROCEDURE — 99213 OFFICE O/P EST LOW 20 MIN: CPT | Mod: 25

## 2020-08-12 NOTE — PHYSICAL EXAM
[No Acute Distress] : no acute distress [Well Nourished] : well nourished [Well Developed] : well developed [Well-Appearing] : well-appearing [Normal Sclera/Conjunctiva] : normal sclera/conjunctiva [PERRL] : pupils equal round and reactive to light [EOMI] : extraocular movements intact [Normal Outer Ear/Nose] : the outer ears and nose were normal in appearance [Normal Oropharynx] : the oropharynx was normal [No JVD] : no jugular venous distention [No Lymphadenopathy] : no lymphadenopathy [Thyroid Normal, No Nodules] : the thyroid was normal and there were no nodules present [Supple] : supple [No Respiratory Distress] : no respiratory distress  [No Accessory Muscle Use] : no accessory muscle use [Clear to Auscultation] : lungs were clear to auscultation bilaterally [Normal Rate] : normal rate  [Regular Rhythm] : with a regular rhythm [Normal S1, S2] : normal S1 and S2 [No Murmur] : no murmur heard [No Carotid Bruits] : no carotid bruits [No Abdominal Bruit] : a ~M bruit was not heard ~T in the abdomen [No Varicosities] : no varicosities [Pedal Pulses Present] : the pedal pulses are present [No Edema] : there was no peripheral edema [No Extremity Clubbing/Cyanosis] : no extremity clubbing/cyanosis [No Palpable Aorta] : no palpable aorta [Non-distended] : non-distended [Non Tender] : non-tender [Soft] : abdomen soft [No HSM] : no HSM [Normal Bowel Sounds] : normal bowel sounds [No Masses] : no abdominal mass palpated [Normal Anterior Cervical Nodes] : no anterior cervical lymphadenopathy [No CVA Tenderness] : no CVA  tenderness [Normal Posterior Cervical Nodes] : no posterior cervical lymphadenopathy [No Spinal Tenderness] : no spinal tenderness [No Joint Swelling] : no joint swelling [Grossly Normal Strength/Tone] : grossly normal strength/tone [No Rash] : no rash [Normal Gait] : normal gait [Coordination Grossly Intact] : coordination grossly intact [No Focal Deficits] : no focal deficits [Normal Affect] : the affect was normal [Normal Insight/Judgement] : insight and judgment were intact

## 2020-08-12 NOTE — HISTORY OF PRESENT ILLNESS
[FreeTextEntry1] : Pt is here to discuss about her thyroid issues, apurva buds, eating disorders and sleep disorder as well.

## 2020-08-13 ENCOUNTER — LABORATORY RESULT (OUTPATIENT)
Age: 60
End: 2020-08-13

## 2020-08-13 LAB — TSH SERPL-ACNC: 0.1 UIU/ML

## 2020-08-26 ENCOUNTER — RX RENEWAL (OUTPATIENT)
Age: 60
End: 2020-08-26

## 2020-08-31 LAB
T3 SERPL-MCNC: 108 NG/DL
T4 FREE SERPL-MCNC: 1.5 NG/DL
TSH SERPL-ACNC: 0.12 UIU/ML

## 2020-09-23 ENCOUNTER — APPOINTMENT (OUTPATIENT)
Dept: GASTROENTEROLOGY | Facility: CLINIC | Age: 60
End: 2020-09-23
Payer: COMMERCIAL

## 2020-09-23 VITALS
TEMPERATURE: 97 F | SYSTOLIC BLOOD PRESSURE: 110 MMHG | BODY MASS INDEX: 23.86 KG/M2 | DIASTOLIC BLOOD PRESSURE: 78 MMHG | HEIGHT: 67 IN | WEIGHT: 152 LBS

## 2020-09-23 DIAGNOSIS — R13.13 DYSPHAGIA, PHARYNGEAL PHASE: ICD-10-CM

## 2020-09-23 DIAGNOSIS — R13.19 OTHER DYSPHAGIA: ICD-10-CM

## 2020-09-23 PROCEDURE — 99214 OFFICE O/P EST MOD 30 MIN: CPT

## 2020-09-23 NOTE — HISTORY OF PRESENT ILLNESS
[de-identified] : The patient is here for dysphagia. We'll the patient is a history of GERD for 20 years. Symptoms were moderate to severe. Specifically she was getting heartburn and regurgitation. Without medication occurs every day and can last up to one hour. His symptoms resolved with Protonix b.i.d., and Pepcid 40 mg q.h.s. EGD in 2015 showed antral gastritis. I do not have the biopsy results.\par     We then July the patient was admitted to [facility]. She would wake up in the morning and he'll her body pointing to the right. He was getting worse. Denied dizziness. Patient with severe hypertension . Systolic blood pressures have been in the 200. She is taking multiple medications however cause  side effects. These medications are benazepril, nadolol, hydralazine, valsartan, lisinopril, lisinopril-hydrochlorothiazide, amlodipine. Currently she is on Bystolic, Telmistartan and clonidine. She has a history of hyperthyroidism and is being treated by endocrinology and cardiology. She is complaining of loss of taste and numbing of the tongue which was told was a side effect of clonidine by her cardiologist\par     Currently her heartburn and regurgitation is under control. Her current complaint is dysphagia. She feels that pills are getting stuck. Sometimes hard foods get stuck in her throat as well. She saw ENT which showed LPR and larygeal edema and was sent back to me.  No weight loss. \par     The patient with a history of collagenous colitis and IBS. Currently her IBS is under control with Bentyl and Benefiber. Previously she had diarrhea and abdominal cramping. A colonoscopy about 5 years ago showed collagenous colitis. Entocort caused constipation. This is eventually stopped. Last colonoscopy in 2018 was negative biopsies that were negative for collagenous colitis. CT scan, stool studies were negative at that time as well.\par \par \par \par \par

## 2020-09-23 NOTE — ASSESSMENT
[FreeTextEntry1] : A/P\par cervical dysphagia\par will get MBS and esophagram.  consider EGD after that , but BP must be controlled\par F/U in 6 weeks \par \par gerd\par Today's instructions for acid reflux include avoid provocative foods. For example citrus alcohol coffee chocolate mints. Smaller meals, no eating 3 hours prior to bedtime and elevate head of the bed prior to sleep.\par protonix bid and pepcid 40 mg   q hs . carafate  1 g qid ( may may a slurry as she cant swallow pills)\par \par hx of collagenous colitis- controlled. Off meds\par \par IBS- Bentyl and Benfiber ptn\par \par \par \par

## 2020-09-23 NOTE — PHYSICAL EXAM
[General Appearance - Alert] : alert [General Appearance - In No Acute Distress] : in no acute distress [General Appearance - Well Nourished] : well nourished [General Appearance - Well Developed] : well developed [Sclera] : the sclera and conjunctiva were normal [PERRL With Normal Accommodation] : pupils were equal in size, round, and reactive to light [Extraocular Movements] : extraocular movements were intact [Outer Ear] : the ears and nose were normal in appearance [Neck Appearance] : the appearance of the neck was normal [Neck Cervical Mass (___cm)] : no neck mass was observed [Respiration, Rhythm And Depth] : normal respiratory rhythm and effort [Exaggerated Use Of Accessory Muscles For Inspiration] : no accessory muscle use [Auscultation Breath Sounds / Voice Sounds] : lungs were clear to auscultation bilaterally [Apical Impulse] : the apical impulse was normal [Heart Rate And Rhythm] : heart rate was normal and rhythm regular [Heart Sounds] : normal S1 and S2 [Bowel Sounds] : normal bowel sounds [Abdomen Soft] : soft [Abdomen Tenderness] : non-tender [Skin Color & Pigmentation] : normal skin color and pigmentation [Skin Turgor] : normal skin turgor [] : no rash [Oriented To Time, Place, And Person] : oriented to person, place, and time [Impaired Insight] : insight and judgment were intact [Affect] : the affect was normal

## 2020-10-01 ENCOUNTER — OUTPATIENT (OUTPATIENT)
Dept: OUTPATIENT SERVICES | Facility: HOSPITAL | Age: 60
LOS: 1 days | End: 2020-10-01
Payer: COMMERCIAL

## 2020-10-01 DIAGNOSIS — R13.10 DYSPHAGIA, UNSPECIFIED: ICD-10-CM

## 2020-10-01 PROCEDURE — 92611 MOTION FLUOROSCOPY/SWALLOW: CPT

## 2020-10-01 PROCEDURE — 74230 X-RAY XM SWLNG FUNCJ C+: CPT | Mod: 26

## 2020-10-01 PROCEDURE — 74220 X-RAY XM ESOPHAGUS 1CNTRST: CPT

## 2020-10-01 PROCEDURE — 74230 X-RAY XM SWLNG FUNCJ C+: CPT

## 2020-10-01 NOTE — SWALLOW VFSS/MBS ASSESSMENT ADULT - SLP PERTINENT HISTORY OF CURRENT PROBLEM
Pt with report of "solids sticking in throat area". Pt also reported recent swallow text ~1 month ago, which was reportedly normal Pt with report of "solids sticking in throat area". Pt also reported recent swallow test ~1 month ago, at ENT office, which was reportedly normal

## 2020-10-01 NOTE — SWALLOW VFSS/MBS ASSESSMENT ADULT - ROSENBEK'S PENETRATION ASPIRATION SCALE
(1) no aspiration, contrast does not enter airway small cup sips; 2: penetration with complete, spontaneous retrieval for consecutive cup sips/(1) no aspiration, contrast does not enter airway

## 2020-10-01 NOTE — SWALLOW VFSS/MBS ASSESSMENT ADULT - DIAGNOSTIC IMPRESSIONS
Overall functional oral & pharyngeal stage of swallow. Study negative for stasis, penetration &/or aspiration for all solids & small cup sips of thin fluids.

## 2020-10-01 NOTE — SWALLOW VFSS/MBS ASSESSMENT ADULT - ORAL PHASE
within functional limits Uncontrolled bolus / spillover in hypopharynx/Uncontrolled bolus / spillover in bhavesh-pharynx

## 2020-10-01 NOTE — SWALLOW VFSS/MBS ASSESSMENT ADULT - COMMENTS
Pt reports h/o reflux, high blood pressure, overactive thyroid, currently sees a neurologist for "pulling to the right" when walking, had MRI: no results yet   Pt to have esophagram after MBS

## 2020-10-27 ENCOUNTER — APPOINTMENT (OUTPATIENT)
Dept: ENDOCRINOLOGY | Facility: CLINIC | Age: 60
End: 2020-10-27

## 2020-11-06 ENCOUNTER — APPOINTMENT (OUTPATIENT)
Dept: GASTROENTEROLOGY | Facility: CLINIC | Age: 60
End: 2020-11-06
Payer: COMMERCIAL

## 2020-11-06 VITALS
HEART RATE: 65 BPM | TEMPERATURE: 97.6 F | HEIGHT: 67 IN | SYSTOLIC BLOOD PRESSURE: 125 MMHG | DIASTOLIC BLOOD PRESSURE: 63 MMHG | OXYGEN SATURATION: 99 % | BODY MASS INDEX: 23.54 KG/M2 | WEIGHT: 150 LBS | RESPIRATION RATE: 14 BRPM

## 2020-11-06 PROCEDURE — 99072 ADDL SUPL MATRL&STAF TM PHE: CPT

## 2020-11-06 PROCEDURE — 99214 OFFICE O/P EST MOD 30 MIN: CPT

## 2020-11-06 NOTE — PHYSICAL EXAM
[General Appearance - Alert] : alert [General Appearance - In No Acute Distress] : in no acute distress [General Appearance - Well Nourished] : well nourished [General Appearance - Well Developed] : well developed [Sclera] : the sclera and conjunctiva were normal [Outer Ear] : the ears and nose were normal in appearance [Neck Appearance] : the appearance of the neck was normal [Neck Cervical Mass (___cm)] : no neck mass was observed [] : no respiratory distress [Respiration, Rhythm And Depth] : normal respiratory rhythm and effort [Exaggerated Use Of Accessory Muscles For Inspiration] : no accessory muscle use [Auscultation Breath Sounds / Voice Sounds] : lungs were clear to auscultation bilaterally [Apical Impulse] : the apical impulse was normal [Heart Rate And Rhythm] : heart rate was normal and rhythm regular [Bowel Sounds] : normal bowel sounds [Abdomen Soft] : soft [Abdomen Tenderness] : non-tender [Cervical Lymph Nodes Enlarged Posterior Bilaterally] : posterior cervical [Abnormal Walk] : normal gait [Skin Color & Pigmentation] : normal skin color and pigmentation [Oriented To Time, Place, And Person] : oriented to person, place, and time [Impaired Insight] : insight and judgment were intact [Affect] : the affect was normal

## 2020-11-06 NOTE — HISTORY OF PRESENT ILLNESS
[de-identified] :  GERD- the patient has history of GERD for 20 years. Moderate to severe. It occurs every day without medication. Specifically she gets heartburn and regurgitation. Patient takes Protonix 40 b.i.d., Pepcid 40 mg q.h.s.was recommended that she take Carafate p.r.n. however she has not been taking this. she has been havingissues with hypertension. She is tinged having side effects to her hypertension medsand has been having changes in her medication.Last visit she was on clonidine and this was causing lost loss of taste  and tounge numbess. she was also having a globus sensation. She had amodified barium swallow and esophagram which is essentially normal.She switched from clonidine to nadolol and a globus sensation improved.\par     The patient has a history of collagenous colitis . In 2015 her biopsies and colonoscopy were positive for collagenous colitis.Her latest colonoscopy in 2018 showed biopsies that were negative for microscopic colitis. She was taking Entocortonce a day instead of 3 times a day as she felt Entocort gave her constipation.\par    she has a history of IBS and gets abdominal cramping. She gets severe abdominal cramping diaphoresis She had a similar episode one week ago. She felt constipated.Symptoms resolve in one day. She tried dicyclomine with no relief of the abdominal cramping.Better after bowel movement.The patient was identified

## 2020-11-06 NOTE — ASSESSMENT
[FreeTextEntry1] : A/P\par gerd- protonix bid, pepcid 40 mg q hs,  carafate  qid prn\par - globus sensation improved\par - F/U in 3 motnhs\par \par collagenous colitis\par constiatipion now with entorcort. Will D/ C for now\par \par IBS- \par benefiber qd\par bentyl prn\par - pt admits to low water intake- needs at least 8 glasses of water a day

## 2020-11-30 LAB
ALBUMIN SERPL ELPH-MCNC: 4.1 G/DL
ALP BLD-CCNC: 71 U/L
ALT SERPL-CCNC: 16 U/L
ANION GAP SERPL CALC-SCNC: 10 MMOL/L
AST SERPL-CCNC: 14 U/L
BASOPHILS # BLD AUTO: 0.06 K/UL
BASOPHILS NFR BLD AUTO: 0.7 %
BILIRUB SERPL-MCNC: 0.2 MG/DL
BUN SERPL-MCNC: 10 MG/DL
CALCIUM SERPL-MCNC: 9.1 MG/DL
CHLORIDE SERPL-SCNC: 106 MMOL/L
CHOLEST SERPL-MCNC: 133 MG/DL
CO2 SERPL-SCNC: 25 MMOL/L
CREAT SERPL-MCNC: 0.9 MG/DL
EOSINOPHIL # BLD AUTO: 0.17 K/UL
EOSINOPHIL NFR BLD AUTO: 2 %
GLUCOSE SERPL-MCNC: 108 MG/DL
HCT VFR BLD CALC: 39.7 %
HDLC SERPL-MCNC: 55 MG/DL
HGB BLD-MCNC: 12.7 G/DL
IMM GRANULOCYTES NFR BLD AUTO: 0.2 %
LDLC SERPL CALC-MCNC: 62 MG/DL
LDLC SERPL DIRECT ASSAY-MCNC: 60 MG/DL
LYMPHOCYTES # BLD AUTO: 2.49 K/UL
LYMPHOCYTES NFR BLD AUTO: 29.2 %
MAN DIFF?: NORMAL
MCHC RBC-ENTMCNC: 30.2 PG
MCHC RBC-ENTMCNC: 32 GM/DL
MCV RBC AUTO: 94.5 FL
MONOCYTES # BLD AUTO: 0.42 K/UL
MONOCYTES NFR BLD AUTO: 4.9 %
NEUTROPHILS # BLD AUTO: 5.38 K/UL
NEUTROPHILS NFR BLD AUTO: 63 %
NONHDLC SERPL-MCNC: 78 MG/DL
PLATELET # BLD AUTO: 350 K/UL
POTASSIUM SERPL-SCNC: 3.9 MMOL/L
PROT SERPL-MCNC: 6.7 G/DL
RBC # BLD: 4.2 M/UL
RBC # FLD: 13.2 %
SODIUM SERPL-SCNC: 141 MMOL/L
T4 FREE SERPL-MCNC: 1.4 NG/DL
THYROGLOB AB SERPL-ACNC: <20 IU/ML
THYROGLOB SERPL-MCNC: 72.1 NG/ML
TRIGL SERPL-MCNC: 81 MG/DL
TSH SERPL-ACNC: 0.14 UIU/ML
WBC # FLD AUTO: 8.54 K/UL

## 2020-12-09 ENCOUNTER — APPOINTMENT (OUTPATIENT)
Dept: ENDOCRINOLOGY | Facility: CLINIC | Age: 60
End: 2020-12-09
Payer: COMMERCIAL

## 2020-12-09 PROCEDURE — 99214 OFFICE O/P EST MOD 30 MIN: CPT | Mod: 95

## 2020-12-09 RX ORDER — VENLAFAXINE 37.5 MG/1
37.5 TABLET ORAL DAILY
Qty: 90 | Refills: 0 | Status: DISCONTINUED | COMMUNITY
Start: 1900-01-01 | End: 2020-12-09

## 2020-12-09 RX ORDER — CLONIDINE HYDROCHLORIDE 0.1 MG/1
0.1 TABLET ORAL TWICE DAILY
Qty: 180 | Refills: 0 | Status: DISCONTINUED | COMMUNITY
Start: 2020-02-24 | End: 2020-12-09

## 2020-12-09 NOTE — ASSESSMENT
[FreeTextEntry1] : Hyperthyroidism secondary to Graves disease in patient who adjusts treatments on her own and she possibly had recent CVA (she signed AMA) \par \par TSH suppressed now. Increase MMI to  5 mg qd \par pcp has a hrd time controlling the HTN \par continue to monitor tfts \par she lost taste and smell due to clonidine and it was replaced. \par monitor TFTs and repeat TFts in 3 mos. \par \par NTMNG : \par f/p FNA benign 2016 \par b/ll subcentimeter nodules. Monitor and repeat US \par \par HTN : medication adjusted by PCP recently. They will monitor and continue to adjust doses.  \par Patient extremely resistant and dismissive to suggestions of new treatment.  \par aldosterone 3,8 low with suppressed renin but she takes bystolic and hydralazine. \par plasma metanephrines slightly high nor metanephrines but not convincing levels just 195 ( upper limit of normal 145) \par \par HLD : LDL at target. TG normal. She wants again to stop medication and i advsied strongly not to stop medication. \par low fat/low cholesterol diet and weight loss advised\par continue crestor 10 mg qd. \par repeat lipids in 3mos. \par \par Verbal consent obtained from patient for telemedicine encounter during COVID crisis.\par Discussed with patient given unable to provide physical exam, evaluation done on symptoms only. \par All lab results reviewed and discussed with patient. All questions answered\par This was a telehealth service rendered via interactive audio telecommunication system.\par

## 2020-12-09 NOTE — HISTORY OF PRESENT ILLNESS
[FreeTextEntry1] : follow up for  Graves disease. \par she has colitis and IBS. \par NTMNG s/p FNA 2014. \par she was seen in ER recently for possible CVA and she signed AMA \par \par thyroid US shows multiple subcentimeter nodule stable. monitor and repeat US in Nov 2019\par thyroid uptake/scan shows uptake 65 %

## 2020-12-09 NOTE — PHYSICAL EXAM
[Clear to Auscultation] : lungs were clear to auscultation bilaterally [No Edema] : no peripheral edema [Not Tender] : non-tender [Acanthosis Nigricans] : no acanthosis nigricans

## 2020-12-23 PROBLEM — Z87.09 HISTORY OF UPPER RESPIRATORY INFECTION: Status: RESOLVED | Noted: 2020-02-20 | Resolved: 2020-12-23

## 2021-01-04 ENCOUNTER — RX RENEWAL (OUTPATIENT)
Age: 61
End: 2021-01-04

## 2021-01-15 ENCOUNTER — APPOINTMENT (OUTPATIENT)
Dept: NEPHROLOGY | Facility: CLINIC | Age: 61
End: 2021-01-15
Payer: COMMERCIAL

## 2021-01-15 VITALS
OXYGEN SATURATION: 99 % | BODY MASS INDEX: 23.88 KG/M2 | WEIGHT: 152.12 LBS | HEIGHT: 67 IN | TEMPERATURE: 97.3 F | HEART RATE: 66 BPM

## 2021-01-15 VITALS — SYSTOLIC BLOOD PRESSURE: 180 MMHG | DIASTOLIC BLOOD PRESSURE: 104 MMHG

## 2021-01-15 PROCEDURE — 99204 OFFICE O/P NEW MOD 45 MIN: CPT

## 2021-01-15 PROCEDURE — 99072 ADDL SUPL MATRL&STAF TM PHE: CPT

## 2021-01-15 RX ORDER — MECLIZINE HYDROCHLORIDE 25 MG/1
25 TABLET ORAL 4 TIMES DAILY
Qty: 50 | Refills: 3 | Status: DISCONTINUED | COMMUNITY
Start: 2020-08-12 | End: 2021-01-15

## 2021-01-15 RX ORDER — TELMISARTAN 80 MG/1
80 TABLET ORAL DAILY
Qty: 90 | Refills: 1 | Status: DISCONTINUED | COMMUNITY
Start: 2020-02-04 | End: 2021-01-15

## 2021-01-15 RX ORDER — DICYCLOMINE HYDROCHLORIDE 20 MG/1
20 TABLET ORAL
Refills: 0 | Status: DISCONTINUED | COMMUNITY
End: 2021-01-15

## 2021-01-20 LAB — ALDOSTERONE SERUM: 3.5 NG/DL

## 2021-01-20 NOTE — REVIEW OF SYSTEMS
[Palpitations] : palpitations [SOB on Exertion] : shortness of breath during exertion [Abdominal Pain] : abdominal pain [As Noted in HPI] : as noted in HPI [Negative] : Integumentary [Chest Pain] : no chest pain

## 2021-01-20 NOTE — ASSESSMENT
[FreeTextEntry1] : I discussed w/ the patient the following plan.  First we need to determine if she has essential hypertension or a secondary form.  We discussed that the positive family history is in favor of essential hypertension but the sudden onset at the age of 58, 18 years after menopause is more consistent w/ a secondary form.  We discussed that a normal renal Doppler and a suppressed PRA are against renovascular hypertension.  We discussed that a suppressed PRA may indicate some form of primary hyperaldosteronism. We also discussed that case reports have found coexistence of Grave's and pheochromocytoma, but that Grave's is not part of syndromic pheochromocytomas.  90% of pheochromocytomas are in the adrenal glands (which are normal on imaging in her case). Extra adrenal pheochromocytoma is rare and often produced normetanephrine.  Her plasma level of this hormone was borderline elevated. So the plan is the following.\par 1. Hypertension: Diagnostically will do PRA and Plasma Aldosterone and 25 hour urine for metanephrine and normetanephrine.  Therapeutically will change to TTS Catapres 0.2 mg  once a week and add Spironlactone 25 mg twice per day to be started after she obtains her blood tests.  The patient will be called once the results of the above studies are available for my review.  She will contact my office in the next two weeks to report how she is responding to the above changes.  A follow up visit has been scheduled in 4 to 6 weeks.\par 2. Nicotine dependence. Discussed that smoking is an additional cardiovascular risk.\par 3. Grave's disease: continue present regimen and follow up w/ Endocrinology.

## 2021-01-20 NOTE — REASON FOR VISIT
[Consultation] : a consultation visit [FreeTextEntry1] : Referred for evaluation of sudden onset hypertension one year ago.

## 2021-01-20 NOTE — PHYSICAL EXAM
[General Appearance - Alert] : alert [Sclera] : the sclera and conjunctiva were normal [Jugular Venous Distention Increased] : there was no jugular-venous distention [Auscultation Breath Sounds / Voice Sounds] : lungs were clear to auscultation bilaterally [Heart Sounds] : normal S1 and S2 [Edema] : there was no peripheral edema [Heart Sounds Gallop] : no gallops [Abdomen Tenderness] : non-tender [] : no hepato-splenomegaly [Urinary Bladder Findings] : the bladder was normal on palpation [No CVA Tenderness] : no ~M costovertebral angle tenderness [Abnormal Walk] : normal gait [No Focal Deficits] : no focal deficits [Oriented To Time, Place, And Person] : oriented to person, place, and time [FreeTextEntry1] : No abdominal bruit

## 2021-01-25 RX ORDER — HYDRALAZINE HYDROCHLORIDE 10 MG/1
10 TABLET ORAL
Refills: 0 | Status: DISCONTINUED | COMMUNITY
End: 2021-01-25

## 2021-01-27 LAB
METAINT: 24 H
METANEPH 24H UR-MRATE: 112 MCG/24 H
METANEPH UR-MCNC: 1925 ML
METANEPHS UR-MCNC: 553 MCG/24 H
NORMETANEPHRINE 24H UR-MCNC: 441 MCG/24 H

## 2021-02-01 ENCOUNTER — APPOINTMENT (OUTPATIENT)
Dept: GASTROENTEROLOGY | Facility: CLINIC | Age: 61
End: 2021-02-01

## 2021-02-02 LAB — RENIN ACTIVITY, PLASMA: <0.167 NG/ML/HR

## 2021-02-08 RX ORDER — SPIRONOLACTONE 25 MG/1
25 TABLET ORAL
Qty: 60 | Refills: 3 | Status: DISCONTINUED | COMMUNITY
Start: 2021-01-15 | End: 2021-02-08

## 2021-02-10 ENCOUNTER — LABORATORY RESULT (OUTPATIENT)
Age: 61
End: 2021-02-10

## 2021-02-19 ENCOUNTER — APPOINTMENT (OUTPATIENT)
Dept: NEPHROLOGY | Facility: CLINIC | Age: 61
End: 2021-02-19
Payer: COMMERCIAL

## 2021-02-19 ENCOUNTER — APPOINTMENT (OUTPATIENT)
Dept: NEPHROLOGY | Facility: CLINIC | Age: 61
End: 2021-02-19

## 2021-02-19 PROCEDURE — 99442: CPT

## 2021-02-19 NOTE — HISTORY OF PRESENT ILLNESS
[Home] : at home, [unfilled] , at the time of the visit. [Medical Office: ([facility])___] : at the medical office located in  [Verbal consent obtained from patient] : the patient, [unfilled] [FreeTextEntry1] : The patient did not come to my office because of the current snow storm. She has agreed to a telephone visit. \par She reports the following: she was seen by her endocrinologist.  Her thyroid function is normal on the current dose of Methimazole.  Her BP in the endocrinologist office was 123/80.  Several days before the patient BP at home was 190/84.  She still has some intermittent night sweats after discontinuation of spironolactone.  She otherwise feels well and has started an exercise regimen.

## 2021-02-19 NOTE — REVIEW OF SYSTEMS
[Chest Pain] : no chest pain [Palpitations] : no palpitations [SOB on Exertion] : no shortness of breath during exertion [As Noted in HPI] : as noted in HPI [Negative] : Psychiatric

## 2021-02-19 NOTE — REASON FOR VISIT
[Follow-Up] : a follow-up visit [FreeTextEntry1] : Follow up for hypertension w/ side effects to multiple medications.

## 2021-02-19 NOTE — ASSESSMENT
[FreeTextEntry1] : 1. Hypertension, most likely essential. 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  The best agents for this syndrome are Clonidine or Labetalol.  The current combination of Bystolic and Clonidine is acceptable.  She has not tolerated spironolactone.  We discussed the addition of a diuretic (Moduretic) but prior to do so the patient will come to the office for a 24 hr ambulatory BP monitor.  Will then review the test and discusse w/ the patient optional therapies.  Of note the patient has no current evidence of target organ damage by her hypertension.  \par 2. Graves disease, well controlled on Methimazole.\par PLAN: set up 24 hr ABPM.  Call patient when results are available.

## 2021-03-04 ENCOUNTER — APPOINTMENT (OUTPATIENT)
Dept: NEPHROLOGY | Facility: CLINIC | Age: 61
End: 2021-03-04
Payer: COMMERCIAL

## 2021-03-04 PROCEDURE — 93784 AMBL BP MNTR W/SOFTWARE: CPT | Mod: GA

## 2021-03-04 PROCEDURE — 99072 ADDL SUPL MATRL&STAF TM PHE: CPT

## 2021-03-05 RX ORDER — CLONIDINE HYDROCHLORIDE 0.1 MG/1
0.1 TABLET ORAL TWICE DAILY
Qty: 180 | Refills: 2 | Status: DISCONTINUED | COMMUNITY
Start: 2021-01-25 | End: 2021-03-05

## 2021-03-16 ENCOUNTER — APPOINTMENT (OUTPATIENT)
Dept: FAMILY MEDICINE | Facility: CLINIC | Age: 61
End: 2021-03-16
Payer: COMMERCIAL

## 2021-03-16 VITALS
WEIGHT: 149 LBS | HEART RATE: 65 BPM | HEIGHT: 67 IN | BODY MASS INDEX: 23.39 KG/M2 | OXYGEN SATURATION: 97 % | TEMPERATURE: 97.5 F

## 2021-03-16 VITALS — SYSTOLIC BLOOD PRESSURE: 140 MMHG | DIASTOLIC BLOOD PRESSURE: 85 MMHG

## 2021-03-16 PROCEDURE — 99212 OFFICE O/P EST SF 10 MIN: CPT

## 2021-03-16 PROCEDURE — 99072 ADDL SUPL MATRL&STAF TM PHE: CPT

## 2021-03-16 NOTE — HISTORY OF PRESENT ILLNESS
[Cold Symptoms] : cold symptoms [___ Weeks ago] :  [unfilled] weeks ago [Chills] : chills [Fatigue] : fatigue [Headache] : headache [Sore Throat] : no sore throat [Wheezing] : no wheezing [Shortness Of Breath] : no shortness of breath [Fever] : no fever

## 2021-03-16 NOTE — ASSESSMENT
[FreeTextEntry1] : Visit note of dictations for this 60-year-old female who came in today complaining of sinus congestion for about a week with a yellow thick discharge the patient is afebrile she smokes approximately half a pack of cigarettes a day pressure was 140/85 heart had a regular rhythm S1-S2 lungs were clear neck was supple plan patient with sinusitis I cautioned the patient about these chronic sinus condition is being caused by cigarette smoking whether or not she would do anything about it I don't know but I stressed to her that the cigarettes could attribute to this chronic sinus problem and she has I gave her a Z-Todd because of the length of time that she's had this and told her to take either Claritin or Allegra 24 hour plane note please I also refilled her Effexor for her to return to the office when needed

## 2021-03-25 ENCOUNTER — NON-APPOINTMENT (OUTPATIENT)
Age: 61
End: 2021-03-25

## 2021-03-25 ENCOUNTER — APPOINTMENT (OUTPATIENT)
Dept: FAMILY MEDICINE | Facility: CLINIC | Age: 61
End: 2021-03-25
Payer: COMMERCIAL

## 2021-03-25 VITALS — OXYGEN SATURATION: 98 % | HEART RATE: 54 BPM | TEMPERATURE: 97.6 F

## 2021-03-25 VITALS — DIASTOLIC BLOOD PRESSURE: 82 MMHG | SYSTOLIC BLOOD PRESSURE: 128 MMHG

## 2021-03-25 DIAGNOSIS — J32.9 CHRONIC SINUSITIS, UNSPECIFIED: ICD-10-CM

## 2021-03-25 PROCEDURE — 99213 OFFICE O/P EST LOW 20 MIN: CPT

## 2021-03-25 PROCEDURE — 99072 ADDL SUPL MATRL&STAF TM PHE: CPT

## 2021-03-25 NOTE — HISTORY OF PRESENT ILLNESS
[Congestion] : congestion [Earache] : earache [Cough] : no cough [Sore Throat] : no sore throat [Anorexia] : no anorexia [Shortness Of Breath] : no shortness of breath [Fatigue] : not fatigue [Headache] : no headache [Fever] : no fever [de-identified] : more than 2 weeks [FreeTextEntry8] : pt feeling congested

## 2021-04-19 ENCOUNTER — APPOINTMENT (OUTPATIENT)
Dept: FAMILY MEDICINE | Facility: CLINIC | Age: 61
End: 2021-04-19
Payer: COMMERCIAL

## 2021-04-19 VITALS — OXYGEN SATURATION: 97 % | TEMPERATURE: 97.7 F | HEART RATE: 74 BPM

## 2021-04-19 DIAGNOSIS — I77.6 ARTERITIS, UNSPECIFIED: ICD-10-CM

## 2021-04-19 PROCEDURE — 99072 ADDL SUPL MATRL&STAF TM PHE: CPT

## 2021-04-19 PROCEDURE — 99213 OFFICE O/P EST LOW 20 MIN: CPT

## 2021-04-19 NOTE — HISTORY OF PRESENT ILLNESS
[FreeTextEntry1] : pt. here for follow up and have a rash [de-identified] : 6 days prior while in [place] noticed a pyric rash on both feet

## 2021-04-20 ENCOUNTER — NON-APPOINTMENT (OUTPATIENT)
Age: 61
End: 2021-04-20

## 2021-04-20 LAB
BASOPHILS # BLD AUTO: 0.06 K/UL
BASOPHILS NFR BLD AUTO: 0.6 %
EOSINOPHIL # BLD AUTO: 0.36 K/UL
EOSINOPHIL NFR BLD AUTO: 3.8 %
ERYTHROCYTE [SEDIMENTATION RATE] IN BLOOD BY WESTERGREN METHOD: 39 MM/HR
HCT VFR BLD CALC: 40.9 %
HGB BLD-MCNC: 13.1 G/DL
IMM GRANULOCYTES NFR BLD AUTO: 0.3 %
LYMPHOCYTES # BLD AUTO: 3.02 K/UL
LYMPHOCYTES NFR BLD AUTO: 32.3 %
MAN DIFF?: NORMAL
MCHC RBC-ENTMCNC: 29.4 PG
MCHC RBC-ENTMCNC: 32 GM/DL
MCV RBC AUTO: 91.9 FL
MONOCYTES # BLD AUTO: 0.75 K/UL
MONOCYTES NFR BLD AUTO: 8 %
NEUTROPHILS # BLD AUTO: 5.14 K/UL
NEUTROPHILS NFR BLD AUTO: 55 %
PLATELET # BLD AUTO: 381 K/UL
RBC # BLD: 4.45 M/UL
RBC # FLD: 14.1 %
WBC # FLD AUTO: 9.36 K/UL

## 2021-04-20 RX ORDER — METHIMAZOLE 5 MG/1
5 TABLET ORAL
Qty: 90 | Refills: 0 | Status: DISCONTINUED | COMMUNITY
Start: 2018-11-13 | End: 2021-04-20

## 2021-04-23 ENCOUNTER — LABORATORY RESULT (OUTPATIENT)
Age: 61
End: 2021-04-23

## 2021-04-26 ENCOUNTER — APPOINTMENT (OUTPATIENT)
Dept: ENDOCRINOLOGY | Facility: CLINIC | Age: 61
End: 2021-04-26
Payer: COMMERCIAL

## 2021-04-26 VITALS
SYSTOLIC BLOOD PRESSURE: 160 MMHG | HEIGHT: 67 IN | BODY MASS INDEX: 23.39 KG/M2 | DIASTOLIC BLOOD PRESSURE: 100 MMHG | WEIGHT: 149 LBS

## 2021-04-26 LAB
ALBUMIN SERPL ELPH-MCNC: 4.4 G/DL
ALDOSTERONE SERUM: <3 NG/DL
ALP BLD-CCNC: 87 U/L
ALT SERPL-CCNC: 17 U/L
ANION GAP SERPL CALC-SCNC: 13 MMOL/L
AST SERPL-CCNC: 13 U/L
BASOPHILS # BLD AUTO: 0.05 K/UL
BASOPHILS NFR BLD AUTO: 0.3 %
BILIRUB SERPL-MCNC: 0.3 MG/DL
BUN SERPL-MCNC: 18 MG/DL
CALCIUM SERPL-MCNC: 10 MG/DL
CHLORIDE SERPL-SCNC: 103 MMOL/L
CHOLEST SERPL-MCNC: 162 MG/DL
CO2 SERPL-SCNC: 24 MMOL/L
CREAT SERPL-MCNC: 0.8 MG/DL
EOSINOPHIL # BLD AUTO: 0.07 K/UL
EOSINOPHIL NFR BLD AUTO: 0.4 %
GLUCOSE SERPL-MCNC: 122 MG/DL
HCT VFR BLD CALC: 46.3 %
HDLC SERPL-MCNC: 78 MG/DL
HGB BLD-MCNC: 14.8 G/DL
IMM GRANULOCYTES NFR BLD AUTO: 0.4 %
LDLC SERPL CALC-MCNC: 60 MG/DL
LDLC SERPL DIRECT ASSAY-MCNC: 69 MG/DL
LYMPHOCYTES # BLD AUTO: 5.93 K/UL
LYMPHOCYTES NFR BLD AUTO: 36.6 %
MAN DIFF?: NORMAL
MCHC RBC-ENTMCNC: 29 PG
MCHC RBC-ENTMCNC: 32 GM/DL
MCV RBC AUTO: 90.6 FL
MONOCYTES # BLD AUTO: 1.11 K/UL
MONOCYTES NFR BLD AUTO: 6.8 %
NEUTROPHILS # BLD AUTO: 8.98 K/UL
NEUTROPHILS NFR BLD AUTO: 55.5 %
NONHDLC SERPL-MCNC: 84 MG/DL
PLATELET # BLD AUTO: 476 K/UL
POTASSIUM SERPL-SCNC: 3.6 MMOL/L
PROT SERPL-MCNC: 7.7 G/DL
RBC # BLD: 5.11 M/UL
RBC # FLD: 14.5 %
SODIUM SERPL-SCNC: 140 MMOL/L
T3 SERPL-MCNC: 213 NG/DL
T4 FREE SERPL-MCNC: 1.5 NG/DL
TRIGL SERPL-MCNC: 118 MG/DL
TSH SERPL-ACNC: 0.37 UIU/ML
WBC # FLD AUTO: 16.21 K/UL

## 2021-04-26 PROCEDURE — 99214 OFFICE O/P EST MOD 30 MIN: CPT

## 2021-04-26 PROCEDURE — 99072 ADDL SUPL MATRL&STAF TM PHE: CPT

## 2021-04-26 RX ORDER — METHYLPREDNISOLONE 4 MG/1
4 TABLET ORAL
Qty: 1 | Refills: 0 | Status: DISCONTINUED | COMMUNITY
Start: 2021-04-19 | End: 2021-04-26

## 2021-04-26 RX ORDER — AZITHROMYCIN 250 MG/1
250 TABLET, FILM COATED ORAL
Qty: 1 | Refills: 0 | Status: DISCONTINUED | COMMUNITY
Start: 2021-03-16 | End: 2021-04-26

## 2021-04-26 RX ORDER — ASPIRIN 81 MG
81 TABLET, DELAYED RELEASE (ENTERIC COATED) ORAL
Refills: 0 | Status: ACTIVE | COMMUNITY

## 2021-04-26 NOTE — ASSESSMENT
[FreeTextEntry1] : Hyperthyroidism secondary to Graves disease in patient who adjusts treatments on her own and she possibly had recent CVA (she signed AMA) . Pt giggles quite often in the exam room. \par \par HyperT : pt euthyroid clinically and biochemically.\par off MMI since OCt 2021.  \par she has the habits of stopping her meds \par \par NTMNG : declines compression sx \par b/ll subcentimeter nodules. \par check thyroid US to evaluate for growth \par \par HTN :   Bp high again. Advised to followup with cardiology and call them. \par Start checking BP at home and follow up with them\par aldosterone 3,8 low with suppressed renin but she takes bystolic and hydralazine. \par plasma metanephrines slightly high nor metanephrines but not convincing levels just 195 ( upper limit of normal 145) \par \par HLD : LDL at target.  \par low fat/low cholesterol diet and weight loss advised\par continue crestor 10 mg qd. \par \par  [Smoking Cessation] : smoking cessation

## 2021-04-26 NOTE — REVIEW OF SYSTEMS
[Easy Bleeding] : a ~M tendency for easy bleeding [Negative] : Endocrine [Poor Balance] : poor balance [de-identified] : w multiple falls

## 2021-04-30 LAB — RENIN ACTIVITY, PLASMA: 0.22 NG/ML/HR

## 2021-05-03 ENCOUNTER — NON-APPOINTMENT (OUTPATIENT)
Age: 61
End: 2021-05-03

## 2021-05-03 LAB
METANEPHRINE, PL: 15.2 PG/ML
NORMETANEPHRINE, PL: 226.3 PG/ML

## 2021-05-25 ENCOUNTER — APPOINTMENT (OUTPATIENT)
Dept: NEPHROLOGY | Facility: CLINIC | Age: 61
End: 2021-05-25
Payer: COMMERCIAL

## 2021-05-25 VITALS
TEMPERATURE: 97.6 F | DIASTOLIC BLOOD PRESSURE: 70 MMHG | BODY MASS INDEX: 24.05 KG/M2 | WEIGHT: 153.22 LBS | OXYGEN SATURATION: 99 % | HEIGHT: 67 IN | HEART RATE: 68 BPM | SYSTOLIC BLOOD PRESSURE: 157 MMHG

## 2021-05-25 DIAGNOSIS — K58.2 MIXED IRRITABLE BOWEL SYNDROME: ICD-10-CM

## 2021-05-25 PROCEDURE — 99213 OFFICE O/P EST LOW 20 MIN: CPT

## 2021-05-25 RX ORDER — CLONIDINE 0.3 MG/24H
0.3 PATCH, EXTENDED RELEASE TRANSDERMAL
Qty: 5 | Refills: 3 | Status: DISCONTINUED | COMMUNITY
Start: 2021-01-15 | End: 2021-05-25

## 2021-05-25 RX ORDER — CLONIDINE 0.2 MG/24H
0.2 PATCH, EXTENDED RELEASE TRANSDERMAL
Qty: 4 | Refills: 0 | Status: DISCONTINUED | COMMUNITY
Start: 2021-02-13

## 2021-05-25 RX ORDER — OLMESARTAN MEDOXOMIL 40 MG/1
40 TABLET, FILM COATED ORAL
Qty: 30 | Refills: 0 | Status: DISCONTINUED | COMMUNITY
Start: 2020-12-02

## 2021-05-25 RX ORDER — HYDROCORTISONE 25 MG/G
2.5 CREAM TOPICAL
Qty: 30 | Refills: 0 | Status: DISCONTINUED | COMMUNITY
Start: 2021-04-15

## 2021-05-26 PROBLEM — K58.2 IRRITABLE BOWEL SYNDROME WITH BOTH CONSTIPATION AND DIARRHEA: Status: ACTIVE | Noted: 2017-07-13

## 2021-05-26 NOTE — HISTORY OF PRESENT ILLNESS
[FreeTextEntry1] : The patient states that she has been feeling well.  She develop a lower extremity maculopapular rash while in [place] that was diagnosed as skin vasculitis and responded to steroids.  \par The patient states that her BP has been better controlled compared to the past. She has discontinued Clonidine patch because she developed a rash at the site of the patch. Her current regimen is Clonidine 0.1 mg twice per day and Amlodipine 2.5 mg once a day.  \par She has not other issues at the time of this visit, except to inform me that when she was hospitalized at [facility] in July of 2020 a CT angio of her neck revealed a 50 to 70% stenosis in the left internal carotid artery.

## 2021-05-26 NOTE — PHYSICAL EXAM
[General Appearance - Alert] : alert [Sclera] : the sclera and conjunctiva were normal [Jugular Venous Distention Increased] : there was no jugular-venous distention [Auscultation Breath Sounds / Voice Sounds] : lungs were clear to auscultation bilaterally [Heart Sounds] : normal S1 and S2 [Heart Sounds Gallop] : no gallops [Edema] : there was no peripheral edema [Abdomen Tenderness] : non-tender [] : no hepato-splenomegaly [Urinary Bladder Findings] : the bladder was normal on palpation [No CVA Tenderness] : no ~M costovertebral angle tenderness [Abnormal Walk] : normal gait [No Focal Deficits] : no focal deficits [Oriented To Time, Place, And Person] : oriented to person, place, and time [FreeTextEntry1] : No abdominal bruit

## 2021-05-26 NOTE — ASSESSMENT
[FreeTextEntry1] : 1. Hypertension, likely essential as the patient has a positive family history and the work up for secondary causes including renovascular disease and primary hyperaldosteronism is negative.  She has a very minimal increase in plasma metanephrine.  This test was done by routing phlebotomy and no using the free flow protocol.  The patient 24 hour urine for metanephrine and normetanephrine shows normal level.  She has a negative CT scan of the abdomen. She has no family history compatible w/ syndromic pheochromocytoma.  \par The patient hypertension is difficult to treat not because it is resistant to medication but because the patient has experienced side effects from all the commonly used classes of antihypertensive including ARB, CEI, beta blockers, mineralocorticoid receptor inhibitors. Her current BP is not adequately controlled.  Her cardiovascular risk factors include her family history (non modifiable), smoking (modifiable), hypertension and hyperlipidemia (controlled on a statin).  We discussed possible options at this point including increasing the clonidine to 0.2 mg twice per day or the amlodipine to 5 mg once per day.  The other option is the use of an SGLT2 inhibitor as she has borderline glucose and significant cardiovascular risk factors.  Of interest the patient has history of hypokalemia but PRA/Aldosterone not compatible w/ hyperaldosteronism.  She has history of thyrotoxicosis and perphaps the hypokalemia was secondary to intracellular potassium shift. \par 2. Internal carotid artery stenosis.  Patient resumed aspirin.  She has a possible history of CVA.  Another reason for better BP control. \par The patient was made aware of the above.  She will follow up w/ endo and PCP.  If the BP is not better, she will make a follow up appointment w/ me.

## 2021-07-02 ENCOUNTER — RX RENEWAL (OUTPATIENT)
Age: 61
End: 2021-07-02

## 2021-07-06 ENCOUNTER — RX RENEWAL (OUTPATIENT)
Age: 61
End: 2021-07-06

## 2021-07-14 ENCOUNTER — NON-APPOINTMENT (OUTPATIENT)
Age: 61
End: 2021-07-14

## 2021-08-09 ENCOUNTER — RX RENEWAL (OUTPATIENT)
Age: 61
End: 2021-08-09

## 2021-09-15 LAB
ALBUMIN SERPL ELPH-MCNC: 4.5 G/DL
ALP BLD-CCNC: 103 U/L
ALT SERPL-CCNC: 19 U/L
ANION GAP SERPL CALC-SCNC: 18 MMOL/L
AST SERPL-CCNC: 17 U/L
BASOPHILS # BLD AUTO: 0.08 K/UL
BASOPHILS NFR BLD AUTO: 0.8 %
BILIRUB DIRECT SERPL-MCNC: 0.1 MG/DL
BILIRUB INDIRECT SERPL-MCNC: 0.2 MG/DL
BILIRUB SERPL-MCNC: 0.2 MG/DL
BUN SERPL-MCNC: 17 MG/DL
CALCIUM SERPL-MCNC: 9.6 MG/DL
CHLORIDE SERPL-SCNC: 99 MMOL/L
CO2 SERPL-SCNC: 20 MMOL/L
CREAT SERPL-MCNC: 0.88 MG/DL
EOSINOPHIL # BLD AUTO: 0.36 K/UL
EOSINOPHIL NFR BLD AUTO: 3.5 %
GLUCOSE SERPL-MCNC: 123 MG/DL
HCT VFR BLD CALC: 47.3 %
HGB BLD-MCNC: 15.3 G/DL
IMM GRANULOCYTES NFR BLD AUTO: 0.4 %
LYMPHOCYTES # BLD AUTO: 3.39 K/UL
LYMPHOCYTES NFR BLD AUTO: 33.3 %
MAN DIFF?: NORMAL
MCHC RBC-ENTMCNC: 29.8 PG
MCHC RBC-ENTMCNC: 32.3 GM/DL
MCV RBC AUTO: 92.2 FL
MONOCYTES # BLD AUTO: 0.8 K/UL
MONOCYTES NFR BLD AUTO: 7.9 %
NEUTROPHILS # BLD AUTO: 5.52 K/UL
NEUTROPHILS NFR BLD AUTO: 54.1 %
PLATELET # BLD AUTO: 386 K/UL
POTASSIUM SERPL-SCNC: 4.7 MMOL/L
PROT SERPL-MCNC: 7.4 G/DL
RBC # BLD: 5.13 M/UL
RBC # FLD: 13.3 %
SODIUM SERPL-SCNC: 137 MMOL/L
WBC # FLD AUTO: 10.19 K/UL

## 2021-09-27 ENCOUNTER — APPOINTMENT (OUTPATIENT)
Dept: GASTROENTEROLOGY | Facility: CLINIC | Age: 61
End: 2021-09-27
Payer: COMMERCIAL

## 2021-09-27 DIAGNOSIS — R19.5 OTHER FECAL ABNORMALITIES: ICD-10-CM

## 2021-09-27 PROCEDURE — 99443: CPT

## 2021-10-08 ENCOUNTER — APPOINTMENT (OUTPATIENT)
Dept: ULTRASOUND IMAGING | Facility: CLINIC | Age: 61
End: 2021-10-08
Payer: COMMERCIAL

## 2021-10-08 ENCOUNTER — OUTPATIENT (OUTPATIENT)
Dept: OUTPATIENT SERVICES | Facility: HOSPITAL | Age: 61
LOS: 1 days | End: 2021-10-08

## 2021-10-08 DIAGNOSIS — R19.5 OTHER FECAL ABNORMALITIES: ICD-10-CM

## 2021-10-08 PROCEDURE — 76700 US EXAM ABDOM COMPLETE: CPT | Mod: 26

## 2021-10-20 ENCOUNTER — APPOINTMENT (OUTPATIENT)
Dept: FAMILY MEDICINE | Facility: CLINIC | Age: 61
End: 2021-10-20
Payer: COMMERCIAL

## 2021-10-20 VITALS
HEART RATE: 60 BPM | TEMPERATURE: 97.7 F | WEIGHT: 155 LBS | HEIGHT: 67 IN | OXYGEN SATURATION: 99 % | BODY MASS INDEX: 24.33 KG/M2

## 2021-10-20 VITALS — DIASTOLIC BLOOD PRESSURE: 78 MMHG | HEART RATE: 64 BPM | SYSTOLIC BLOOD PRESSURE: 125 MMHG

## 2021-10-20 DIAGNOSIS — F41.9 ANXIETY DISORDER, UNSPECIFIED: ICD-10-CM

## 2021-10-20 DIAGNOSIS — F32.A ANXIETY DISORDER, UNSPECIFIED: ICD-10-CM

## 2021-10-20 DIAGNOSIS — D72.829 ELEVATED WHITE BLOOD CELL COUNT, UNSPECIFIED: ICD-10-CM

## 2021-10-20 PROCEDURE — 99214 OFFICE O/P EST MOD 30 MIN: CPT

## 2021-10-21 LAB
ALBUMIN SERPL ELPH-MCNC: 4.6 G/DL
ALP BLD-CCNC: 103 U/L
ALT SERPL-CCNC: 16 U/L
ANION GAP SERPL CALC-SCNC: 16 MMOL/L
AST SERPL-CCNC: 18 U/L
BASOPHILS # BLD AUTO: 0.07 K/UL
BASOPHILS NFR BLD AUTO: 0.7 %
BILIRUB SERPL-MCNC: 0.3 MG/DL
BUN SERPL-MCNC: 15 MG/DL
CALCIUM SERPL-MCNC: 10.3 MG/DL
CHLORIDE SERPL-SCNC: 103 MMOL/L
CHOLEST SERPL-MCNC: 225 MG/DL
CO2 SERPL-SCNC: 22 MMOL/L
CREAT SERPL-MCNC: 0.91 MG/DL
EOSINOPHIL # BLD AUTO: 0.23 K/UL
EOSINOPHIL NFR BLD AUTO: 2.4 %
GLUCOSE SERPL-MCNC: 87 MG/DL
HCT VFR BLD CALC: 47.5 %
HDLC SERPL-MCNC: 57 MG/DL
HGB BLD-MCNC: 15.6 G/DL
IMM GRANULOCYTES NFR BLD AUTO: 0.2 %
LDLC SERPL CALC-MCNC: 145 MG/DL
LYMPHOCYTES # BLD AUTO: 3.77 K/UL
LYMPHOCYTES NFR BLD AUTO: 39.3 %
MAN DIFF?: NORMAL
MCHC RBC-ENTMCNC: 30.5 PG
MCHC RBC-ENTMCNC: 32.8 GM/DL
MCV RBC AUTO: 93 FL
MONOCYTES # BLD AUTO: 0.71 K/UL
MONOCYTES NFR BLD AUTO: 7.4 %
NEUTROPHILS # BLD AUTO: 4.79 K/UL
NEUTROPHILS NFR BLD AUTO: 50 %
NONHDLC SERPL-MCNC: 169 MG/DL
PLATELET # BLD AUTO: 421 K/UL
POTASSIUM SERPL-SCNC: 4.6 MMOL/L
PROT SERPL-MCNC: 7.9 G/DL
RBC # BLD: 5.11 M/UL
RBC # FLD: 13.2 %
SODIUM SERPL-SCNC: 141 MMOL/L
T3FREE SERPL-MCNC: 2.85 PG/ML
T4 FREE SERPL-MCNC: 1.3 NG/DL
TRIGL SERPL-MCNC: 120 MG/DL
TSH SERPL-ACNC: 0.62 UIU/ML
WBC # FLD AUTO: 9.59 K/UL

## 2021-10-26 ENCOUNTER — APPOINTMENT (OUTPATIENT)
Dept: ENDOCRINOLOGY | Facility: CLINIC | Age: 61
End: 2021-10-26
Payer: COMMERCIAL

## 2021-10-26 VITALS
SYSTOLIC BLOOD PRESSURE: 176 MMHG | HEIGHT: 67 IN | BODY MASS INDEX: 24.33 KG/M2 | WEIGHT: 155 LBS | DIASTOLIC BLOOD PRESSURE: 82 MMHG

## 2021-10-26 PROCEDURE — 99214 OFFICE O/P EST MOD 30 MIN: CPT

## 2021-10-26 RX ORDER — ROSUVASTATIN CALCIUM 10 MG/1
10 TABLET, FILM COATED ORAL
Qty: 90 | Refills: 1 | Status: DISCONTINUED | COMMUNITY
Start: 2020-07-14 | End: 2021-10-26

## 2021-10-26 RX ORDER — PANTOPRAZOLE 40 MG/1
40 TABLET, DELAYED RELEASE ORAL TWICE DAILY
Qty: 180 | Refills: 1 | Status: DISCONTINUED | COMMUNITY
Start: 2018-04-09 | End: 2021-10-26

## 2021-10-26 RX ORDER — AMLODIPINE BESYLATE 2.5 MG/1
2.5 TABLET ORAL
Qty: 90 | Refills: 3 | Status: DISCONTINUED | COMMUNITY
Start: 2021-03-05 | End: 2021-10-26

## 2021-10-26 RX ORDER — METHIMAZOLE 5 MG/1
5 TABLET ORAL DAILY
Qty: 90 | Refills: 0 | Status: DISCONTINUED | COMMUNITY
Start: 2021-07-06 | End: 2021-10-26

## 2021-10-26 NOTE — ASSESSMENT
[FreeTextEntry1] : \par Hyperthyroidism secondary to Graves disease\par -has been off MMi since 10/2020, levels stabalized\par \par NTMNG:\par Send for repeat thyroid u/s \par \par HTN\par -stable today \par -Take BP medication every day\par -Encouraged to follow up with PCP\par \par HLD\par -cholesterol elevated\par -needs to be more consistent with cholesterol medication and take daily\par -increase diet and efforts \par -Encouraged to increase exercise to 30 mins/day x 5 days a week\par -follow up with PCP who controls cholesterol medication \par \par \par Encouraged to stop smoking, smoking over 1 ppd\par \par \par RTO in 6 months with Dr. Vicente

## 2021-10-26 NOTE — REVIEW OF SYSTEMS
[Fatigue] : fatigue [Blurred Vision] : blurred vision [Recent Weight Gain (___ Lbs)] : no recent weight gain [Recent Weight Loss (___ Lbs)] : no recent weight loss [Visual Field Defect] : no visual field defect [Dysphagia] : no dysphagia [Neck Pain] : no neck pain [Chest Pain] : no chest pain [Palpitations] : no palpitations [Shortness Of Breath] : no shortness of breath [Nausea] : no nausea [Constipation] : no constipation [Vomiting] : no vomiting [Diarrhea] : no diarrhea [Polyuria] : no polyuria [Polydipsia] : no polydipsia [FreeTextEntry3] : obsessed with her phone

## 2021-10-26 NOTE — HISTORY OF PRESENT ILLNESS
[FreeTextEntry1] : Pt talks all over the place, giggles often, very anxious and hyper\par \par follow up for Graves disease. \par she has colitis and IBS. \par NTMNG s/p FNA 2014. \par \par \par HyperT : pt euthyroid clinically and biochemically.\par off MMI since OCt 2020\par she has the habits of stopping her meds \par \par TSH: 0.62, FT3 2.85, FT4 1.3\par \par NTMNG : \par b/l subcentimeter nodules. \par -Last u/s in 2018, thyroid US shows multiple subcentimeter nodule stable\par \par HTN :\par She takes bystolic and clonidine\par -stable in office Today 134/82\par \par HLD\par -Stopped cholesterol medication, but states she takes it sometimes\par -does not watch diet

## 2021-10-26 NOTE — PHYSICAL EXAM
[Alert] : alert [Normal Sclera/Conjunctiva] : normal sclera/conjunctiva [Normal Hearing] : hearing was normal [No Respiratory Distress] : no respiratory distress [Clear to Auscultation] : lungs were clear to auscultation bilaterally [Normal S1, S2] : normal S1 and S2 [Normal Rate] : heart rate was normal [No Stigmata of Cushings Syndrome] : no stigmata of Cushings Syndrome [Normal Gait] : normal gait [Oriented x3] : oriented to person, place, and time [de-identified] : bilateral thyroid nodules palpable

## 2021-12-03 ENCOUNTER — APPOINTMENT (OUTPATIENT)
Dept: GASTROENTEROLOGY | Facility: CLINIC | Age: 61
End: 2021-12-03
Payer: COMMERCIAL

## 2021-12-03 VITALS
DIASTOLIC BLOOD PRESSURE: 82 MMHG | HEIGHT: 67 IN | WEIGHT: 154 LBS | RESPIRATION RATE: 14 BRPM | TEMPERATURE: 97.6 F | HEART RATE: 68 BPM | BODY MASS INDEX: 24.17 KG/M2 | SYSTOLIC BLOOD PRESSURE: 130 MMHG | OXYGEN SATURATION: 98 %

## 2021-12-03 PROCEDURE — 99214 OFFICE O/P EST MOD 30 MIN: CPT

## 2021-12-03 NOTE — ASSESSMENT
[FreeTextEntry1] : A/P\par The patient is here for follow-up of GERD\par Today's instructions for acid reflux include avoid provocative foods. For example citrus alcohol coffee chocolate mints. Smaller meals, no eating 3 hours prior to bedtime and elevate head of the bed prior to sleep.\par protonix bid\par D/C pepcid\par \par \par collagenous colitis. - now seems to have resolved. No diarrhea\par \par IBS- bentyl prn, high fiber diet\par \par \par F/u in 6 months\par \par visit 30 minutes - reviewed old EGD and colon report, notes, and reordered meds

## 2021-12-03 NOTE — HISTORY OF PRESENT ILLNESS
[de-identified] : The patient is here in follow-up of GERD, collagenous colitis, IBS\par    Patient with a history of GERD for 21 years.  Symptoms are moderate to severe.  She gets heartburn and regurgitation as well as globus sensation.  Currently her symptoms are controlled with Protonix twice daily.  At one time she was on Protonix twice daily Pepcid nightly and Carafate as needed.  She had a globus sensation earlier this year.  The modified barium swallow and esophagram were negative.  Her last EGD was in 2015 which showed gastritis.  She had an ultrasound in 2021 which was normal.  Her symptoms seem to be better as she changed the timing of her aspirin.  She was taking it at night which seemed to give her acid regurgitation at night.  She now change it during the day and her symptoms have resolved.\par    In the past patient was diagnosed with collagenous colitis in 2015.  She intermittently gets diarrhea.  In 2018 she had a colonoscopy which is negative for collagenous colitis.  She found that Entocort gave her constipation.  She rarely takes Entocort if she gets a flare of diarrhea.  She does have known IBS.  She gets lower abdominal cramping it resolves with dicyclomine as needed.  She only uses it every few months.  In October 2021 her hemoglobin was 15.6 and LFTs were normal

## 2021-12-30 ENCOUNTER — NON-APPOINTMENT (OUTPATIENT)
Age: 61
End: 2021-12-30

## 2022-01-10 ENCOUNTER — LABORATORY RESULT (OUTPATIENT)
Age: 62
End: 2022-01-10

## 2022-01-10 ENCOUNTER — APPOINTMENT (OUTPATIENT)
Dept: INTERNAL MEDICINE | Facility: CLINIC | Age: 62
End: 2022-01-10
Payer: COMMERCIAL

## 2022-01-10 VITALS — RESPIRATION RATE: 14 BRPM | HEART RATE: 65 BPM | TEMPERATURE: 98 F | OXYGEN SATURATION: 98 %

## 2022-01-10 DIAGNOSIS — Z20.822 CONTACT WITH AND (SUSPECTED) EXPOSURE TO COVID-19: ICD-10-CM

## 2022-01-10 PROCEDURE — 99213 OFFICE O/P EST LOW 20 MIN: CPT

## 2022-01-10 NOTE — PLAN
[FreeTextEntry1] : 62 yo F for cough. Suspect likely secondary to COVID-19. Antibodies obtained. Recommend Continue Supportive OTC treatment PRN as well as Flovent/Albuterol. Signs and symptoms warranting further evaluation discussed. All questions answered. Pt voiced understanding and in agreement to above plan. RTC as necessary.

## 2022-01-10 NOTE — PHYSICAL EXAM
[No Acute Distress] : no acute distress [Well Nourished] : well nourished [Well Developed] : well developed [Well-Appearing] : well-appearing [Normal Voice/Communication] : normal voice/communication [Normal Sclera/Conjunctiva] : normal sclera/conjunctiva [No Respiratory Distress] : no respiratory distress  [Clear to Auscultation] : lungs were clear to auscultation bilaterally [Normal Rate] : normal rate  [Normal S1, S2] : normal S1 and S2 [Soft] : abdomen soft [Normal Bowel Sounds] : normal bowel sounds [Normal Gait] : normal gait [Speech Grossly Normal] : speech grossly normal [Normal Mood] : the mood was normal

## 2022-01-10 NOTE — REVIEW OF SYSTEMS
[Cough] : cough [Fever] : no fever [Discharge] : no discharge [Chest Pain] : no chest pain [Abdominal Pain] : no abdominal pain [Dysuria] : no dysuria

## 2022-01-10 NOTE — HISTORY OF PRESENT ILLNESS
[Cold Symptoms] : cold symptoms [Moderate] : moderate [___ Weeks ago] :  [unfilled] weeks ago [Episodic] : episodic  [Congestion] : congestion [Cough] : cough [Fatigue] : fatigue [OTC Remedies] : OTC remedies [Activity] : with activity [At Night] : at night [In Morning] : in the morning [Worsening] : worsening [Sore Throat] : no sore throat [Wheezing] : no wheezing [Chills] : no chills [Anorexia] : no anorexia [Shortness Of Breath] : no shortness of breath [Earache] : no earache [Headache] : no headache [Fever] : no fever [FreeTextEntry7] : Nasal bones, throat. [FreeTextEntry5] : Amoxicilin [FreeTextEntry8] : 60 yo F for cold symptoms x 2 weeks ago. States that she never got tested for COVID but was exposed to a few people who did test positive. Noting cough/congestion. States mucinex and antihistamine are drying her up. No fevers. Is a smoker. Was vaccinated for COVID.

## 2022-01-11 LAB
COVID-19 NUCLEOCAPSID  GAM ANTIBODY INTERPRETATION: NEGATIVE
SARS-COV-2 AB SERPL QL IA: 0.44 INDEX

## 2022-01-11 RX ORDER — FLUTICASONE PROPIONATE 44 UG/1
44 AEROSOL, METERED RESPIRATORY (INHALATION) TWICE DAILY
Qty: 1 | Refills: 0 | Status: DISCONTINUED | COMMUNITY
Start: 2022-01-10 | End: 2022-01-11

## 2022-01-27 ENCOUNTER — TRANSCRIPTION ENCOUNTER (OUTPATIENT)
Age: 62
End: 2022-01-27

## 2022-02-23 ENCOUNTER — APPOINTMENT (OUTPATIENT)
Dept: PULMONOLOGY | Facility: CLINIC | Age: 62
End: 2022-02-23
Payer: COMMERCIAL

## 2022-02-23 VITALS
OXYGEN SATURATION: 97 % | HEIGHT: 67 IN | SYSTOLIC BLOOD PRESSURE: 154 MMHG | DIASTOLIC BLOOD PRESSURE: 90 MMHG | RESPIRATION RATE: 14 BRPM | BODY MASS INDEX: 23.54 KG/M2 | HEART RATE: 65 BPM | WEIGHT: 150 LBS

## 2022-02-23 PROCEDURE — 99214 OFFICE O/P EST MOD 30 MIN: CPT

## 2022-02-23 RX ORDER — DICYCLOMINE HYDROCHLORIDE 20 MG/1
20 TABLET ORAL 4 TIMES DAILY
Qty: 360 | Refills: 1 | Status: DISCONTINUED | COMMUNITY
Start: 2021-12-03 | End: 2022-02-23

## 2022-02-23 NOTE — PHYSICAL EXAM
[Normal Appearance] : normal appearance [General Appearance - Well Developed] : well developed [Well Groomed] : well groomed [No Deformities] : no deformities [General Appearance - Well Nourished] : well nourished [General Appearance - In No Acute Distress] : no acute distress [Normal Conjunctiva] : the conjunctiva exhibited no abnormalities [Eyelids - No Xanthelasma] : the eyelids demonstrated no xanthelasmas [Low Lying Soft Palate] : low lying soft palate [Elongated Uvula] : elongated uvula [III] : III [Neck Appearance] : the appearance of the neck was normal [Neck Cervical Mass (___cm)] : no neck mass was observed [Jugular Venous Distention Increased] : there was no jugular-venous distention [Thyroid Diffuse Enlargement] : the thyroid was not enlarged [Thyroid Nodule] : there were no palpable thyroid nodules [Heart Rate And Rhythm] : heart rate and rhythm were normal [Heart Sounds] : normal S1 and S2 [Murmurs] : no murmurs present [Respiration, Rhythm And Depth] : normal respiratory rhythm and effort [Exaggerated Use Of Accessory Muscles For Inspiration] : no accessory muscle use [Auscultation Breath Sounds / Voice Sounds] : lungs were clear to auscultation bilaterally [Chest Palpation] : palpation of the chest revealed no abnormalities [Lungs Percussion] : the lungs were normal to percussion [Abdomen Soft] : soft [Abdomen Tenderness] : non-tender [Abdomen Mass (___ Cm)] : no abdominal mass palpated [Abnormal Walk] : normal gait [Gait - Sufficient For Exercise Testing] : the gait was sufficient for exercise testing [Nail Clubbing] : no clubbing of the fingernails [Cyanosis, Localized] : no localized cyanosis [Petechial Hemorrhages (___cm)] : no petechial hemorrhages [Deep Tendon Reflexes (DTR)] : deep tendon reflexes were 2+ and symmetric [Sensation] : the sensory exam was normal to light touch and pinprick [No Focal Deficits] : no focal deficits [Oriented To Time, Place, And Person] : oriented to person, place, and time [Impaired Insight] : insight and judgment were intact [Affect] : the affect was normal [Skin Color & Pigmentation] : normal skin color and pigmentation [Skin Turgor] : normal skin turgor [] : no rash

## 2022-02-23 NOTE — HISTORY OF PRESENT ILLNESS
Pharmacy is calling us to inform you that pt came by asking them that he wants a refill on a medication he used to take for  inflammation and muscle pain and he mentioned Diclofenac sodium . Please Advise if you approve this medication for pt. ( I do s [TextBox_4] : The patient was last seen here in 2019. She has mild sleep apnea of moderate fragmentation with excessive daytime sleepiness. She was unable to tolerate CPAP at the time. She was undergoing dental implants and we were waiting for those to finish to get her an oral appliance. She also is complaining of dyspnea on exertion. She had an episode of thyrotoxicosis and was placed on beta blockers. Her pulmonary function studies were normal at that time and we felt that she may have had some chronotropic insufficiency from beta blockers leading to shortness of breath.\par \par The patient was lost to followup. She's had issues with hypertension out of control going on for the past year. She is noted to snore. She has also been complaining of increasing dyspnea on exertion. She continues to smoke. She denies coughing or wheezing.She had Covid 19 in December and was not hospitalized for it. She was fully vaccinated. She did have a chest x-ray done in urgent care which was normal around that time. The image was personally reviewed.

## 2022-02-23 NOTE — ASSESSMENT
[FreeTextEntry1] : The patient continues with dyspnea on exertion. I still feel chronotropic insufficiency is a main cause. It's unlikely that she has developed COPD in the last 3 years. Repeat spirometry has been scheduled. She is seeing cardiology next week. It's unlikely that he can decrease the beta blockers in the face of her hypertension.\par \par The patient has developed hypertension out of control. She has untreated obstructive sleep apnea. I told the patient that she must start using CPAP again. I gave her a new mask to use.f/u 6 weeks.

## 2022-02-24 ENCOUNTER — APPOINTMENT (OUTPATIENT)
Dept: CARDIOLOGY | Facility: CLINIC | Age: 62
End: 2022-02-24
Payer: COMMERCIAL

## 2022-02-24 ENCOUNTER — NON-APPOINTMENT (OUTPATIENT)
Age: 62
End: 2022-02-24

## 2022-02-24 VITALS
HEART RATE: 62 BPM | SYSTOLIC BLOOD PRESSURE: 179 MMHG | RESPIRATION RATE: 16 BRPM | WEIGHT: 155 LBS | HEIGHT: 67 IN | OXYGEN SATURATION: 100 % | DIASTOLIC BLOOD PRESSURE: 89 MMHG | BODY MASS INDEX: 24.33 KG/M2

## 2022-02-24 DIAGNOSIS — R07.9 CHEST PAIN, UNSPECIFIED: ICD-10-CM

## 2022-02-24 PROCEDURE — 99214 OFFICE O/P EST MOD 30 MIN: CPT

## 2022-02-24 PROCEDURE — 93000 ELECTROCARDIOGRAM COMPLETE: CPT

## 2022-02-24 RX ORDER — BACILLUS COAGULANS/INULIN 1B-250 MG
1-250 CAPSULE ORAL
Refills: 0 | Status: DISCONTINUED | COMMUNITY
End: 2022-02-24

## 2022-02-25 ENCOUNTER — APPOINTMENT (OUTPATIENT)
Dept: CARDIOLOGY | Facility: CLINIC | Age: 62
End: 2022-02-25

## 2022-03-02 ENCOUNTER — APPOINTMENT (OUTPATIENT)
Dept: CARDIOLOGY | Facility: CLINIC | Age: 62
End: 2022-03-02
Payer: COMMERCIAL

## 2022-03-02 PROCEDURE — 93306 TTE W/DOPPLER COMPLETE: CPT

## 2022-03-08 ENCOUNTER — APPOINTMENT (OUTPATIENT)
Dept: CARDIOLOGY | Facility: CLINIC | Age: 62
End: 2022-03-08

## 2022-03-13 ENCOUNTER — NON-APPOINTMENT (OUTPATIENT)
Age: 62
End: 2022-03-13

## 2022-03-14 ENCOUNTER — APPOINTMENT (OUTPATIENT)
Dept: INTERNAL MEDICINE | Facility: CLINIC | Age: 62
End: 2022-03-14
Payer: COMMERCIAL

## 2022-03-14 VITALS
SYSTOLIC BLOOD PRESSURE: 163 MMHG | HEART RATE: 64 BPM | TEMPERATURE: 97.1 F | OXYGEN SATURATION: 97 % | RESPIRATION RATE: 15 BRPM | DIASTOLIC BLOOD PRESSURE: 81 MMHG | HEIGHT: 67 IN | WEIGHT: 153 LBS | BODY MASS INDEX: 24.01 KG/M2

## 2022-03-14 DIAGNOSIS — B35.0 TINEA BARBAE AND TINEA CAPITIS: ICD-10-CM

## 2022-03-14 PROCEDURE — 99213 OFFICE O/P EST LOW 20 MIN: CPT | Mod: 25

## 2022-03-14 NOTE — PHYSICAL EXAM
[No Acute Distress] : no acute distress [Well Nourished] : well nourished [Well Developed] : well developed [Well-Appearing] : well-appearing [Normal Voice/Communication] : normal voice/communication [Normal Sclera/Conjunctiva] : normal sclera/conjunctiva [No Respiratory Distress] : no respiratory distress  [Normal Rate] : normal rate  [Non-distended] : non-distended [Speech Grossly Normal] : speech grossly normal [Normal Mood] : the mood was normal [de-identified] : Back of neck, beneath hairline with several symmetrical pinkish papules, no obvious exudate or surrounding erythema

## 2022-03-14 NOTE — REVIEW OF SYSTEMS
[Fever] : no fever [Fatigue] : no fatigue [Discharge] : no discharge [Earache] : no earache [Sore Throat] : no sore throat [Chest Pain] : no chest pain [Palpitations] : no palpitations [Shortness Of Breath] : no shortness of breath [Cough] : no cough [Abdominal Pain] : no abdominal pain [Dysuria] : no dysuria [Itching] : Itching [Skin Rash] : skin rash [Headache] : no headache [Dizziness] : no dizziness

## 2022-03-14 NOTE — PLAN
[FreeTextEntry1] : 61-year-old female for neck rash.  Suspect folliculitis that is fungal in etiology.  Will check labs.  Patient to verify medication prescribed by urgent care with this writer prior to starting treatment.  Patient was advised to keep area dry. \par \par Hypertension.  BP noted to be elevated.  Asymptomatic.  Patient does see cardiologist for blood pressure control.\par  All questions answered.  Patient voiced understanding and agreement above plan.  Return to clinic as recommended.

## 2022-03-14 NOTE — HISTORY OF PRESENT ILLNESS
[Rash (Location) ___] : rash on [unfilled] [Mild] : mild [Stable] : stable [de-identified] : Back of neck; Beneath hairline [FreeTextEntry8] : Patient is C/O rash on the Neck.  Patient reports that she does get hair extensions.  Went to urgent care and was given an antifungal pill, shampoo, topical agent.  She reports that the pharmacist questioned that topical agent.  Patient reports reluctance with regards to antifungal pill.  Would like her liver functions checked prior to doing so.  Has tried shampoo with no relief.  Notes rash to be itchy.\par \par Also states she has been prescribed this pill in the past for fungal infection in her nails but was reluctant to take because of potential side effect to liver.

## 2022-03-15 ENCOUNTER — NON-APPOINTMENT (OUTPATIENT)
Age: 62
End: 2022-03-15

## 2022-03-15 LAB
ALBUMIN SERPL ELPH-MCNC: 4.4 G/DL
ALP BLD-CCNC: 81 U/L
ALT SERPL-CCNC: 15 U/L
ANION GAP SERPL CALC-SCNC: 12 MMOL/L
AST SERPL-CCNC: 11 U/L
BASOPHILS # BLD AUTO: 0.06 K/UL
BASOPHILS NFR BLD AUTO: 0.8 %
BILIRUB SERPL-MCNC: 0.3 MG/DL
BUN SERPL-MCNC: 12 MG/DL
CALCIUM SERPL-MCNC: 9.3 MG/DL
CHLORIDE SERPL-SCNC: 105 MMOL/L
CO2 SERPL-SCNC: 24 MMOL/L
CREAT SERPL-MCNC: 0.89 MG/DL
EGFR: 73 ML/MIN/1.73M2
EOSINOPHIL # BLD AUTO: 0.3 K/UL
EOSINOPHIL NFR BLD AUTO: 3.9 %
GLUCOSE SERPL-MCNC: 116 MG/DL
HCT VFR BLD CALC: 43.8 %
HGB BLD-MCNC: 14.3 G/DL
IMM GRANULOCYTES NFR BLD AUTO: 0.3 %
LYMPHOCYTES # BLD AUTO: 2.68 K/UL
LYMPHOCYTES NFR BLD AUTO: 34.9 %
MAN DIFF?: NORMAL
MCHC RBC-ENTMCNC: 29.5 PG
MCHC RBC-ENTMCNC: 32.6 GM/DL
MCV RBC AUTO: 90.3 FL
MONOCYTES # BLD AUTO: 0.51 K/UL
MONOCYTES NFR BLD AUTO: 6.6 %
NEUTROPHILS # BLD AUTO: 4.12 K/UL
NEUTROPHILS NFR BLD AUTO: 53.5 %
PLATELET # BLD AUTO: 367 K/UL
POTASSIUM SERPL-SCNC: 4.3 MMOL/L
PROT SERPL-MCNC: 7.1 G/DL
RBC # BLD: 4.85 M/UL
RBC # FLD: 13.4 %
SODIUM SERPL-SCNC: 142 MMOL/L
WBC # FLD AUTO: 7.69 K/UL

## 2022-03-17 ENCOUNTER — APPOINTMENT (OUTPATIENT)
Dept: CARDIOLOGY | Facility: CLINIC | Age: 62
End: 2022-03-17

## 2022-03-28 ENCOUNTER — APPOINTMENT (OUTPATIENT)
Dept: PULMONOLOGY | Facility: CLINIC | Age: 62
End: 2022-03-28
Payer: COMMERCIAL

## 2022-03-28 ENCOUNTER — APPOINTMENT (OUTPATIENT)
Dept: FAMILY MEDICINE | Facility: CLINIC | Age: 62
End: 2022-03-28
Payer: COMMERCIAL

## 2022-03-28 VITALS — BODY MASS INDEX: 24.28 KG/M2 | HEART RATE: 102 BPM | WEIGHT: 155 LBS | TEMPERATURE: 96.2 F | OXYGEN SATURATION: 96 %

## 2022-03-28 VITALS
DIASTOLIC BLOOD PRESSURE: 80 MMHG | OXYGEN SATURATION: 95 % | RESPIRATION RATE: 16 BRPM | HEART RATE: 74 BPM | SYSTOLIC BLOOD PRESSURE: 142 MMHG

## 2022-03-28 VITALS — WEIGHT: 155 LBS | HEIGHT: 65 IN | BODY MASS INDEX: 25.83 KG/M2

## 2022-03-28 DIAGNOSIS — M79.673 PAIN IN UNSPECIFIED FOOT: ICD-10-CM

## 2022-03-28 PROCEDURE — 99213 OFFICE O/P EST LOW 20 MIN: CPT

## 2022-03-28 PROCEDURE — 99214 OFFICE O/P EST MOD 30 MIN: CPT | Mod: 25

## 2022-03-28 PROCEDURE — 94010 BREATHING CAPACITY TEST: CPT

## 2022-03-28 NOTE — HISTORY OF PRESENT ILLNESS
[TextBox_4] : Patient is doing a little better with her CPAP. She is nasal pillows and reduce with occasionally. She came in for pulmonary function studies. Dyspnea on exertion is occurring when going from rest to activity but not with steady state.

## 2022-03-28 NOTE — ASSESSMENT
[FreeTextEntry1] : Mild sleep apnea clinically doing better. I gave her a different type of nasal pillow to try. Followup in 6 months with compliance.

## 2022-03-28 NOTE — HISTORY OF PRESENT ILLNESS
[de-identified] : pt. here with right foot swelling   which started on Saturday [FreeTextEntry8] : pain betwwen 2 and 3 toes x2 days

## 2022-04-06 ENCOUNTER — APPOINTMENT (OUTPATIENT)
Dept: CARDIOLOGY | Facility: CLINIC | Age: 62
End: 2022-04-06

## 2022-04-13 ENCOUNTER — APPOINTMENT (OUTPATIENT)
Dept: FAMILY MEDICINE | Facility: CLINIC | Age: 62
End: 2022-04-13

## 2022-04-17 ENCOUNTER — TRANSCRIPTION ENCOUNTER (OUTPATIENT)
Age: 62
End: 2022-04-17

## 2022-04-19 ENCOUNTER — APPOINTMENT (OUTPATIENT)
Dept: CARDIOLOGY | Facility: CLINIC | Age: 62
End: 2022-04-19
Payer: COMMERCIAL

## 2022-04-19 PROCEDURE — 93015 CV STRESS TEST SUPVJ I&R: CPT

## 2022-04-19 PROCEDURE — 78452 HT MUSCLE IMAGE SPECT MULT: CPT

## 2022-04-19 PROCEDURE — A9500: CPT

## 2022-04-19 RX ORDER — REGADENOSON 0.08 MG/ML
0.4 INJECTION, SOLUTION INTRAVENOUS
Qty: 4 | Refills: 0 | Status: COMPLETED | OUTPATIENT
Start: 2022-04-19

## 2022-04-19 RX ADMIN — REGADENOSON 5 MG/5ML: 0.08 INJECTION, SOLUTION INTRAVENOUS at 00:00

## 2022-04-19 NOTE — PHYSICAL EXAM
[General Appearance - Well Developed] : well developed [General Appearance - Well Nourished] : well nourished [Normal Conjunctiva] : the conjunctiva exhibited no abnormalities [Normal Oropharynx] : normal oropharynx [Normal Jugular Venous V Waves Present] : normal jugular venous V waves present [] : no respiratory distress [Respiration, Rhythm And Depth] : normal respiratory rhythm and effort [Auscultation Breath Sounds / Voice Sounds] : lungs were clear to auscultation bilaterally [Heart Rate And Rhythm] : heart rate and rhythm were normal [Heart Sounds] : normal S1 and S2 [Murmurs] : no murmurs present [Bowel Sounds] : normal bowel sounds [Abdomen Soft] : soft [Abdomen Tenderness] : non-tender [Abnormal Walk] : normal gait [Cyanosis, Localized] : no localized cyanosis [Nail Clubbing] : no clubbing of the fingernails [Skin Color & Pigmentation] : normal skin color and pigmentation [Skin Turgor] : normal skin turgor [Oriented To Time, Place, And Person] : oriented to person, place, and time [Affect] : the affect was normal [FreeTextEntry1] : no edema, DP 2+ , reduced capillary refill > 3 seconds

## 2022-04-19 NOTE — HISTORY OF PRESENT ILLNESS
[FreeTextEntry1] : Patient is a 60yo F with HTN, mild carotid disease, MR, GERD, collagenous colitis, RVOT PVC's here for cardiac follow up. Has struggled with HTN and medication tolerance over years. Having increased dyspnea on exertion lately. TRouble sleeping too. No CP. Patient denies PND/orthopnea/edema/palpitations/syncope/claudication. NO stress lately she states, better. Struggles on elliptical with dyspnea. ALso with knee pain. Noting some palps as well at night, at times associated with CP. NO exertional CP and no diaphoresis. BP at home running high as well and up to 170s/90s past couple weeks. Didnt take clonidine this morning \par \par Lives with  and dog. 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 Enjoys working in garden when weather better. \par \par \par \par ROS: Neuro and  negative

## 2022-04-19 NOTE — ASSESSMENT
[FreeTextEntry1] : ECG: SR, inferior and lateral ST depressions \par \par  HDL 55   (2/2020)\par CMP/Mg/amylase/lipase unremarkable, glucose 108\par Serum normetaneprhine 196 (upper limit  normal 146), metanephrine 28 (normal) \par Renin Activity Plasma < 0.167\par TSH 2.32\par Cortisol 10\par Calcium 9.6, PTH 48\par Aldosterone 3.8 \par \par ECHO 6/2020:\par 1. Normal LV size, systolic and diastolic function. EF 55-60%\par 2. Normal RV/LA/RA \par 3. Mild MR\par \par RENAL DUPLEX 1/2020: No evidence stenosis bilaterally \par \par REVA/PVR 2/2020:\par 1. R 0.97 L 1.03\par 2. Normal PVR\par \par PHARM NUCLEAR STRESS TEST 5/2019:\par 1. Negative for ischemia, EF 72%\par 2. BP hypertensive at baseline with normal response\par \par ECHO 5/2019:\par 1. Mildly increased LV wall thickness\par 2. Normal LV function, EF 65-70%\par 3. Mildly increased RV thickness\par 4. Trivial pericardial effusion\par \par CAROTID DUPLEX 5/2019\par 1. No hemodynamically significant stenosis\par 2. antegrade flow in vertebral arteries

## 2022-04-19 NOTE — ADDENDUM
[FreeTextEntry1] : 4/19/2022: Echo and pharm nuclear stress test unremarkable. Plan as above, follow up in May when returns from being out of town.

## 2022-04-19 NOTE — DISCUSSION/SUMMARY
[FreeTextEntry1] : Patient is a 60yo F with HTN, minimal carotid disease, MR, GERD, collagenous colitis, RVOT PVC's here for cardiac follow up. \par -HAving difficult to control accelerated H TN recently. Secondary work up with equivocal findings. May just have low renin HTN and very salt sensitive, done in 2020 . Component of anxiety too\par -Had advised MRI to eval pheo given equivocal metanephrine findings,\par -Pulmonary suspects dyspnea from chronotropic incompetence, doubts COPD. Does have untreated AARON\par -Has been taking clonidine 0.1mg qam and 0.2mg qpm\par \par 1. Trial of increasing clonidine to 0.2mg bid, unable to take it TID\par 2. Will plan echo and nuclear stress testing to evaluate dyspnea, CP, palps and for chronotropic competence\par 3. Continue zetia , has refused statins and tried with side effects of cramps. \par 4. Recommend aggressive diet and lifestyle modifications , very low salt diet (2gm or less) \par 5. Follow up after testing \par 6. Hold off on MRI, low suspicion at this time of pheo

## 2022-04-20 ENCOUNTER — APPOINTMENT (OUTPATIENT)
Dept: FAMILY MEDICINE | Facility: CLINIC | Age: 62
End: 2022-04-20
Payer: COMMERCIAL

## 2022-04-20 VITALS — HEART RATE: 64 BPM | OXYGEN SATURATION: 97 % | TEMPERATURE: 97.2 F

## 2022-04-20 DIAGNOSIS — Z23 ENCOUNTER FOR IMMUNIZATION: ICD-10-CM

## 2022-04-20 PROCEDURE — 99214 OFFICE O/P EST MOD 30 MIN: CPT

## 2022-04-22 ENCOUNTER — NON-APPOINTMENT (OUTPATIENT)
Age: 62
End: 2022-04-22

## 2022-04-23 LAB — NT-PROBNP SERPL-MCNC: 124 PG/ML

## 2022-04-25 ENCOUNTER — APPOINTMENT (OUTPATIENT)
Dept: FAMILY MEDICINE | Facility: CLINIC | Age: 62
End: 2022-04-25
Payer: COMMERCIAL

## 2022-04-25 VITALS — HEART RATE: 72 BPM | OXYGEN SATURATION: 98 % | TEMPERATURE: 97.3 F

## 2022-04-25 DIAGNOSIS — S61.219A LACERATION W/OUT FOREIGN BODY OF UNSPECIFIED FINGER W/OUT DAMAGE TO NAIL, INITIAL ENCOUNTER: ICD-10-CM

## 2022-04-25 LAB
ALBUMIN SERPL ELPH-MCNC: 4.3 G/DL
ALP BLD-CCNC: 85 U/L
ALT SERPL-CCNC: 19 U/L
ANION GAP SERPL CALC-SCNC: 12 MMOL/L
AST SERPL-CCNC: 18 U/L
BASOPHILS # BLD AUTO: 0.06 K/UL
BASOPHILS NFR BLD AUTO: 0.6 %
BILIRUB SERPL-MCNC: 0.3 MG/DL
BUN SERPL-MCNC: 13 MG/DL
CALCIUM SERPL-MCNC: 9.8 MG/DL
CHLORIDE SERPL-SCNC: 102 MMOL/L
CHOLEST SERPL-MCNC: 241 MG/DL
CO2 SERPL-SCNC: 24 MMOL/L
CREAT SERPL-MCNC: 0.85 MG/DL
EGFR: 77 ML/MIN/1.73M2
EOSINOPHIL # BLD AUTO: 0.27 K/UL
EOSINOPHIL NFR BLD AUTO: 2.8 %
GLUCOSE SERPL-MCNC: 120 MG/DL
HCT VFR BLD CALC: 43 %
HDLC SERPL-MCNC: 51 MG/DL
HGB BLD-MCNC: 14.1 G/DL
IMM GRANULOCYTES NFR BLD AUTO: 0.3 %
LDLC SERPL CALC-MCNC: 165 MG/DL
LDLC SERPL DIRECT ASSAY-MCNC: 166 MG/DL
LYMPHOCYTES # BLD AUTO: 3.13 K/UL
LYMPHOCYTES NFR BLD AUTO: 32.9 %
MAN DIFF?: NORMAL
MCHC RBC-ENTMCNC: 29.7 PG
MCHC RBC-ENTMCNC: 32.8 GM/DL
MCV RBC AUTO: 90.7 FL
MONOCYTES # BLD AUTO: 0.57 K/UL
MONOCYTES NFR BLD AUTO: 6 %
NEUTROPHILS # BLD AUTO: 5.44 K/UL
NEUTROPHILS NFR BLD AUTO: 57.4 %
NONHDLC SERPL-MCNC: 190 MG/DL
PLATELET # BLD AUTO: 361 K/UL
POTASSIUM SERPL-SCNC: 4.1 MMOL/L
PROT SERPL-MCNC: 7.2 G/DL
RBC # BLD: 4.74 M/UL
RBC # FLD: 13.8 %
SODIUM SERPL-SCNC: 137 MMOL/L
T3 SERPL-MCNC: 198 NG/DL
T4 FREE SERPL-MCNC: 1.5 NG/DL
TRIGL SERPL-MCNC: 123 MG/DL
TSH SERPL-ACNC: 0.19 UIU/ML
WBC # FLD AUTO: 9.5 K/UL

## 2022-04-25 PROCEDURE — 99214 OFFICE O/P EST MOD 30 MIN: CPT

## 2022-04-28 ENCOUNTER — APPOINTMENT (OUTPATIENT)
Dept: ENDOCRINOLOGY | Facility: CLINIC | Age: 62
End: 2022-04-28
Payer: COMMERCIAL

## 2022-04-28 VITALS — SYSTOLIC BLOOD PRESSURE: 126 MMHG | DIASTOLIC BLOOD PRESSURE: 88 MMHG | HEART RATE: 73 BPM | OXYGEN SATURATION: 98 %

## 2022-04-28 VITALS — BODY MASS INDEX: 25.99 KG/M2 | WEIGHT: 156 LBS | HEIGHT: 65 IN

## 2022-04-28 PROCEDURE — 99214 OFFICE O/P EST MOD 30 MIN: CPT

## 2022-04-28 RX ORDER — ALBUTEROL SULFATE 90 UG/1
108 (90 BASE) INHALANT RESPIRATORY (INHALATION) EVERY 6 HOURS
Qty: 1 | Refills: 0 | Status: DISCONTINUED | COMMUNITY
Start: 2022-01-10 | End: 2022-04-28

## 2022-04-28 RX ORDER — CEFADROXIL 500 MG/1
500 CAPSULE ORAL
Qty: 14 | Refills: 0 | Status: DISCONTINUED | COMMUNITY
Start: 2022-02-16 | End: 2022-04-28

## 2022-04-28 RX ORDER — FAMOTIDINE 40 MG/1
40 TABLET, FILM COATED ORAL DAILY
Qty: 90 | Refills: 1 | Status: DISCONTINUED | COMMUNITY
Start: 2020-01-21 | End: 2022-04-28

## 2022-04-28 RX ORDER — AZELASTINE HYDROCHLORIDE AND FLUTICASONE PROPIONATE 137; 50 UG/1; UG/1
137-50 SPRAY, METERED NASAL
Qty: 1 | Refills: 3 | Status: DISCONTINUED | COMMUNITY
Start: 2021-03-25 | End: 2022-04-28

## 2022-04-28 RX ORDER — DICLOFENAC SODIUM 75 MG/1
75 TABLET, DELAYED RELEASE ORAL
Qty: 30 | Refills: 0 | Status: DISCONTINUED | COMMUNITY
Start: 2021-11-29 | End: 2022-04-28

## 2022-04-28 RX ORDER — CEFADROXIL 500 MG/1
500 CAPSULE ORAL TWICE DAILY
Qty: 20 | Refills: 0 | Status: DISCONTINUED | COMMUNITY
Start: 2022-04-20 | End: 2022-04-28

## 2022-04-28 NOTE — PHYSICAL EXAM
[Obese] : obese [Clear to Auscultation] : lungs were clear to auscultation bilaterally [No Edema] : no peripheral edema [Not Tender] : non-tender [Acanthosis Nigricans] : no acanthosis nigricans

## 2022-04-28 NOTE — ASSESSMENT
[FreeTextEntry1] : Hyperthyroidism secondary to Graves disease in patient who adjusts treatments on her own and she possibly had recent CVA (she signed AMA) . Pt giggles quite often in the exam room. She stopped again all of her medication. \par \par HyperT : TSh suppressed. stopped biotin for 7 days and repeat tfts 10 days and then decide if start MMI \par \par NTMNG : declines compression sx . b/ll subcentimeter nodules. \par check thyroid US to evaluate for growth \par \par HTN :   bp at target. continue o followup with cardiology. \par aldosterone 3,8 low with suppressed renin but she takes bystolic and hydralazine. \par plasma metanephrines slightly high nor metanephrines but not convincing levels just 195 ( upper limit of normal 145) \par \par HLD : L she stopped her statin .continue crestor 10 mg qd. \par \par low fat/low cholesterol diet and weight loss advised\par

## 2022-04-29 ENCOUNTER — APPOINTMENT (OUTPATIENT)
Dept: FAMILY MEDICINE | Facility: CLINIC | Age: 62
End: 2022-04-29
Payer: COMMERCIAL

## 2022-04-29 VITALS — HEART RATE: 72 BPM | OXYGEN SATURATION: 98 % | TEMPERATURE: 97 F

## 2022-04-29 DIAGNOSIS — S61.219A LACERATION W/OUT FOREIGN BODY OF UNSPECIFIED FINGER W/OUT DAMAGE TO NAIL, INITIAL ENCOUNTER: ICD-10-CM

## 2022-04-29 PROCEDURE — 99214 OFFICE O/P EST MOD 30 MIN: CPT

## 2022-05-09 ENCOUNTER — NON-APPOINTMENT (OUTPATIENT)
Age: 62
End: 2022-05-09

## 2022-05-09 LAB
T3 SERPL-MCNC: 123 NG/DL
T4 FREE SERPL-MCNC: 1.2 NG/DL
TSH SERPL-ACNC: 0.19 UIU/ML

## 2022-05-11 LAB
ALBUMIN SERPL ELPH-MCNC: 4.4 G/DL
ALP BLD-CCNC: 86 U/L
ALT SERPL-CCNC: 29 U/L
ANION GAP SERPL CALC-SCNC: 14 MMOL/L
AST SERPL-CCNC: 24 U/L
BASOPHILS # BLD AUTO: 0.16 K/UL
BASOPHILS NFR BLD AUTO: 1.7 %
BILIRUB SERPL-MCNC: 0.4 MG/DL
BUN SERPL-MCNC: 13 MG/DL
CALCIUM SERPL-MCNC: 9.5 MG/DL
CHLORIDE SERPL-SCNC: 101 MMOL/L
CHOLEST SERPL-MCNC: 268 MG/DL
CHOLEST/HDLC SERPL: 4.8 RATIO
CO2 SERPL-SCNC: 27 MMOL/L
CREAT SERPL-MCNC: 0.8 MG/DL
EOSINOPHIL # BLD AUTO: 0.35 K/UL
EOSINOPHIL NFR BLD AUTO: 3.6 %
GLUCOSE SERPL-MCNC: 108 MG/DL
HCT VFR BLD CALC: 45.4 %
HDLC SERPL-MCNC: 55 MG/DL
HGB BLD-MCNC: 14.2 G/DL
IMM GRANULOCYTES NFR BLD AUTO: 0.5 %
LDLC SERPL CALC-MCNC: 179 MG/DL
LDLC SERPL DIRECT ASSAY-MCNC: 195 MG/DL
LYMPHOCYTES # BLD AUTO: 3.49 K/UL
LYMPHOCYTES NFR BLD AUTO: 36.2 %
MAN DIFF?: NORMAL
MCHC RBC-ENTMCNC: 30.7 PG
MCHC RBC-ENTMCNC: 31.3 GM/DL
MCV RBC AUTO: 98.1 FL
MONOCYTES # BLD AUTO: 0.66 K/UL
MONOCYTES NFR BLD AUTO: 6.8 %
NEUTROPHILS # BLD AUTO: 4.94 K/UL
NEUTROPHILS NFR BLD AUTO: 51.2 %
PLATELET # BLD AUTO: 402 K/UL
POTASSIUM SERPL-SCNC: 3.7 MMOL/L
PROT SERPL-MCNC: 7.1 G/DL
RBC # BLD: 4.63 M/UL
RBC # FLD: 13.6 %
SODIUM SERPL-SCNC: 142 MMOL/L
T3 SERPL-MCNC: 111 NG/DL
T4 FREE SERPL-MCNC: 1.2 NG/DL
TRIGL SERPL-MCNC: 164 MG/DL
TSH SERPL-ACNC: 2.32 UIU/ML
WBC # FLD AUTO: 9.65 K/UL

## 2022-05-26 ENCOUNTER — APPOINTMENT (OUTPATIENT)
Dept: FAMILY MEDICINE | Facility: CLINIC | Age: 62
End: 2022-05-26
Payer: COMMERCIAL

## 2022-05-26 VITALS — HEART RATE: 68 BPM | TEMPERATURE: 97.8 F | OXYGEN SATURATION: 97 %

## 2022-05-26 VITALS — DIASTOLIC BLOOD PRESSURE: 84 MMHG | SYSTOLIC BLOOD PRESSURE: 135 MMHG | HEART RATE: 78 BPM

## 2022-05-26 DIAGNOSIS — J40 BRONCHITIS, NOT SPECIFIED AS ACUTE OR CHRONIC: ICD-10-CM

## 2022-05-26 PROCEDURE — 99214 OFFICE O/P EST MOD 30 MIN: CPT

## 2022-05-26 NOTE — HISTORY OF PRESENT ILLNESS
[Cough] : cough [FreeTextEntry1] : started last week pt. went to urgent care is on medication [FreeTextEntry8] : cough congested saw MD in [place] bronchitis

## 2022-05-31 ENCOUNTER — RX RENEWAL (OUTPATIENT)
Age: 62
End: 2022-05-31

## 2022-06-17 ENCOUNTER — APPOINTMENT (OUTPATIENT)
Dept: GASTROENTEROLOGY | Facility: CLINIC | Age: 62
End: 2022-06-17
Payer: COMMERCIAL

## 2022-06-17 VITALS
WEIGHT: 152 LBS | BODY MASS INDEX: 25.33 KG/M2 | OXYGEN SATURATION: 95 % | RESPIRATION RATE: 16 BRPM | HEART RATE: 71 BPM | SYSTOLIC BLOOD PRESSURE: 135 MMHG | TEMPERATURE: 98.2 F | DIASTOLIC BLOOD PRESSURE: 96 MMHG | HEIGHT: 65 IN

## 2022-06-17 DIAGNOSIS — K21.9 GASTRO-ESOPHAGEAL REFLUX DISEASE W/OUT ESOPHAGITIS: ICD-10-CM

## 2022-06-17 DIAGNOSIS — K58.0 IRRITABLE BOWEL SYNDROME WITH DIARRHEA: ICD-10-CM

## 2022-06-17 PROCEDURE — 99214 OFFICE O/P EST MOD 30 MIN: CPT

## 2022-06-17 RX ORDER — BECLOMETHASONE DIPROPIONATE HFA 40 UG/1
40 AEROSOL, METERED RESPIRATORY (INHALATION)
Qty: 1 | Refills: 0 | Status: DISCONTINUED | COMMUNITY
Start: 2022-01-11 | End: 2022-06-17

## 2022-06-17 RX ORDER — FLUTICASONE PROPIONATE AND SALMETEROL 250; 50 UG/1; UG/1
250-50 POWDER RESPIRATORY (INHALATION)
Qty: 1 | Refills: 0 | Status: DISCONTINUED | COMMUNITY
Start: 2022-05-26 | End: 2022-06-17

## 2022-06-17 RX ORDER — METHYLPREDNISOLONE 4 MG/1
4 TABLET ORAL
Qty: 1 | Refills: 0 | Status: DISCONTINUED | COMMUNITY
Start: 2022-05-26 | End: 2022-06-17

## 2022-06-17 RX ORDER — CYCLOSPORINE 0.5 MG/ML
0.05 EMULSION OPHTHALMIC
Qty: 60 | Refills: 0 | Status: DISCONTINUED | COMMUNITY
Start: 2021-11-17 | End: 2022-06-17

## 2022-06-17 RX ORDER — LIFITEGRAST 50 MG/ML
5 SOLUTION/ DROPS OPHTHALMIC
Qty: 60 | Refills: 0 | Status: DISCONTINUED | COMMUNITY
Start: 2022-02-15 | End: 2022-06-17

## 2022-06-17 NOTE — HISTORY OF PRESENT ILLNESS
[de-identified] : The patient has a history of GERD for 22 years.  Moderate severity heartburn regurgitation.  Symptoms are controlled with Protonix twice daily.  Her EGD in 2015 showed gastritis.  Symptoms were much better when she changed her timing of the aspirin from a.m. to night.  She has had dysphagia in the past but esophagram and modified barium swallow were negative.  No longer has dysphagia\par     Has history of collagenous colitis.  In the past she had constipation with Entocort.  If she has periods of diarrhea then she will use the Entocort and diarrhea quickly resolves.  She was first diagnosed in 2015.  Her colonoscopy in 2018 had negative biopsies for collagenous colitis.  She does have IBS and gets intermittent diarrhea which she takes dicyclomine as needed with good results.\par    In May 2022 she had a low TSH.  Meds are being adjusted as per primary.  April 2022 CBC was normal

## 2022-06-17 NOTE — ASSESSMENT
[FreeTextEntry1] : A/P\par gerd\par Today's instructions for acid reflux include avoid provocative foods. For example citrus alcohol coffee chocolate mints. Smaller meals, no eating 3 hours prior to bedtime and elevate head of the bed prior to sleep.\par Protonix 40 mg twice daily\par \par \par IBS\par Dicyclomine 4 times daily as needed\par \par Patient with history of collagenous colitis–in remission\par Can give Entocort if needed in the future\par Up in 1 year

## 2022-08-10 LAB
ALBUMIN SERPL ELPH-MCNC: 4.3 G/DL
ALP BLD-CCNC: 84 U/L
ALT SERPL-CCNC: 17 U/L
ANION GAP SERPL CALC-SCNC: 15 MMOL/L
AST SERPL-CCNC: 16 U/L
BASOPHILS # BLD AUTO: 0.08 K/UL
BASOPHILS NFR BLD AUTO: 0.9 %
BILIRUB SERPL-MCNC: 0.3 MG/DL
BUN SERPL-MCNC: 10 MG/DL
CALCIUM SERPL-MCNC: 9.5 MG/DL
CHLORIDE SERPL-SCNC: 103 MMOL/L
CO2 SERPL-SCNC: 23 MMOL/L
CREAT SERPL-MCNC: 0.93 MG/DL
EGFR: 69 ML/MIN/1.73M2
EOSINOPHIL # BLD AUTO: 0.37 K/UL
EOSINOPHIL NFR BLD AUTO: 4.3 %
GLUCOSE SERPL-MCNC: 138 MG/DL
HCT VFR BLD CALC: 44.7 %
HGB BLD-MCNC: 14.5 G/DL
IMM GRANULOCYTES NFR BLD AUTO: 1.9 %
LYMPHOCYTES # BLD AUTO: 2.96 K/UL
LYMPHOCYTES NFR BLD AUTO: 34.7 %
MAN DIFF?: NORMAL
MCHC RBC-ENTMCNC: 30.4 PG
MCHC RBC-ENTMCNC: 32.4 GM/DL
MCV RBC AUTO: 93.7 FL
MONOCYTES # BLD AUTO: 0.56 K/UL
MONOCYTES NFR BLD AUTO: 6.6 %
NEUTROPHILS # BLD AUTO: 4.39 K/UL
NEUTROPHILS NFR BLD AUTO: 51.6 %
PLATELET # BLD AUTO: 354 K/UL
POTASSIUM SERPL-SCNC: 5 MMOL/L
PROT SERPL-MCNC: 6.8 G/DL
RBC # BLD: 4.77 M/UL
RBC # FLD: 14.1 %
SODIUM SERPL-SCNC: 141 MMOL/L
T3 SERPL-MCNC: 101 NG/DL
T4 FREE SERPL-MCNC: 1.2 NG/DL
TSH SERPL-ACNC: 0.99 UIU/ML
WBC # FLD AUTO: 8.52 K/UL

## 2022-08-18 ENCOUNTER — NON-APPOINTMENT (OUTPATIENT)
Age: 62
End: 2022-08-18

## 2022-08-19 ENCOUNTER — APPOINTMENT (OUTPATIENT)
Dept: ENDOCRINOLOGY | Facility: CLINIC | Age: 62
End: 2022-08-19

## 2022-08-19 PROCEDURE — 99214 OFFICE O/P EST MOD 30 MIN: CPT | Mod: 95

## 2022-08-19 NOTE — ASSESSMENT
[FreeTextEntry1] : Hyperthyroidism secondary to Graves disease in patient who adjusts treatments on her own and she possibly had recent CVA (she signed AMA) . She stops her meds on and off.  Aggressive verbally. Curses at  \par \par HyperT : \par pt euthyroid clinically and biochemically.\par cont MMI 2.5 mg qd \par she has recurrent hot flashes.  Advsied to see her gyn since she has not been to one in many years. \par \par NTMNG : declines compression sx . b/ll subcentimeter nodules. \par check thyroid US to evaluate for growth again \par she never goes. \par \par HTN : Continue current management.\par I advised low fat/low cholesterol diet, low salt diet, and weight loss\par \par HLD : she takes statin. check lipids next appt \par low fat/low cholesterol diet and weight loss advised\par \par all lab results reviewed and discussed with patient with extensive discussion. All questions answered\par Laboratory tests ordered today\par Diagnosis and prognosis discussed\par Continue other current medications \par \par Verbal consent obtained from patient for telemedicine .\par Discussed with patient given unable to provide physical exam, evaluation done on symptoms only. \par All lab results reviewed and discussed with patient. All questions answered\par This was a telehealth service rendered via interactive audio and video telecommunication\par  system.\par \par \par \par \par

## 2022-08-19 NOTE — HISTORY OF PRESENT ILLNESS
[Home] : at home, [unfilled] , at the time of the visit. [Medical Office: ([facility])___] : at the medical office located in  [Verbal consent obtained from patient] : the patient, [unfilled] [FreeTextEntry1] : follow up for  Graves disease. \par she has colitis and IBS. \par NTMNG s/p FNA 2014. \par she was seen in ER for possible CVA and she signed AMA \par \par thyroid uptake/scan shows uptake 65 %

## 2022-08-29 ENCOUNTER — APPOINTMENT (OUTPATIENT)
Dept: FAMILY MEDICINE | Facility: CLINIC | Age: 62
End: 2022-08-29

## 2022-08-29 ENCOUNTER — EMERGENCY (EMERGENCY)
Facility: HOSPITAL | Age: 62
LOS: 1 days | Discharge: LEFT BEFORE DISCHARGE | End: 2022-08-29
Attending: STUDENT IN AN ORGANIZED HEALTH CARE EDUCATION/TRAINING PROGRAM
Payer: COMMERCIAL

## 2022-08-29 VITALS
SYSTOLIC BLOOD PRESSURE: 126 MMHG | HEART RATE: 58 BPM | OXYGEN SATURATION: 98 % | DIASTOLIC BLOOD PRESSURE: 80 MMHG | RESPIRATION RATE: 14 BRPM

## 2022-08-29 VITALS
DIASTOLIC BLOOD PRESSURE: 99 MMHG | OXYGEN SATURATION: 98 % | HEIGHT: 67 IN | HEART RATE: 51 BPM | SYSTOLIC BLOOD PRESSURE: 153 MMHG | WEIGHT: 148.81 LBS | RESPIRATION RATE: 18 BRPM | TEMPERATURE: 98 F

## 2022-08-29 VITALS
OXYGEN SATURATION: 99 % | DIASTOLIC BLOOD PRESSURE: 99 MMHG | TEMPERATURE: 98 F | SYSTOLIC BLOOD PRESSURE: 208 MMHG | HEART RATE: 51 BPM | RESPIRATION RATE: 18 BRPM

## 2022-08-29 DIAGNOSIS — R29.810 FACIAL WEAKNESS: ICD-10-CM

## 2022-08-29 DIAGNOSIS — R47.81 SLURRED SPEECH: ICD-10-CM

## 2022-08-29 DIAGNOSIS — R42 DIZZINESS AND GIDDINESS: ICD-10-CM

## 2022-08-29 LAB
ALBUMIN SERPL ELPH-MCNC: 4.1 G/DL — SIGNIFICANT CHANGE UP (ref 3.3–5.2)
ALP SERPL-CCNC: 86 U/L — SIGNIFICANT CHANGE UP (ref 40–120)
ALT FLD-CCNC: 15 U/L — SIGNIFICANT CHANGE UP
ANION GAP SERPL CALC-SCNC: 9 MMOL/L — SIGNIFICANT CHANGE UP (ref 5–17)
APTT BLD: 30.3 SEC — SIGNIFICANT CHANGE UP (ref 27.5–35.5)
AST SERPL-CCNC: 19 U/L — SIGNIFICANT CHANGE UP
BASOPHILS # BLD AUTO: 0.07 K/UL — SIGNIFICANT CHANGE UP (ref 0–0.2)
BASOPHILS NFR BLD AUTO: 0.7 % — SIGNIFICANT CHANGE UP (ref 0–2)
BILIRUB SERPL-MCNC: 0.3 MG/DL — LOW (ref 0.4–2)
BLD GP AB SCN SERPL QL: SIGNIFICANT CHANGE UP
BUN SERPL-MCNC: 11.8 MG/DL — SIGNIFICANT CHANGE UP (ref 8–20)
CALCIUM SERPL-MCNC: 9 MG/DL — SIGNIFICANT CHANGE UP (ref 8.4–10.5)
CHLORIDE SERPL-SCNC: 103 MMOL/L — SIGNIFICANT CHANGE UP (ref 98–107)
CO2 SERPL-SCNC: 26 MMOL/L — SIGNIFICANT CHANGE UP (ref 22–29)
CREAT SERPL-MCNC: 0.72 MG/DL — SIGNIFICANT CHANGE UP (ref 0.5–1.3)
EGFR: 94 ML/MIN/1.73M2 — SIGNIFICANT CHANGE UP
EOSINOPHIL # BLD AUTO: 0.3 K/UL — SIGNIFICANT CHANGE UP (ref 0–0.5)
EOSINOPHIL NFR BLD AUTO: 3.1 % — SIGNIFICANT CHANGE UP (ref 0–6)
GLUCOSE SERPL-MCNC: 90 MG/DL — SIGNIFICANT CHANGE UP (ref 70–99)
HCT VFR BLD CALC: 42.7 % — SIGNIFICANT CHANGE UP (ref 34.5–45)
HGB BLD-MCNC: 14.7 G/DL — SIGNIFICANT CHANGE UP (ref 11.5–15.5)
IMM GRANULOCYTES NFR BLD AUTO: 0.2 % — SIGNIFICANT CHANGE UP (ref 0–1.5)
INR BLD: 1.05 RATIO — SIGNIFICANT CHANGE UP (ref 0.88–1.16)
LYMPHOCYTES # BLD AUTO: 3.5 K/UL — HIGH (ref 1–3.3)
LYMPHOCYTES # BLD AUTO: 36.2 % — SIGNIFICANT CHANGE UP (ref 13–44)
MCHC RBC-ENTMCNC: 30.6 PG — SIGNIFICANT CHANGE UP (ref 27–34)
MCHC RBC-ENTMCNC: 34.4 GM/DL — SIGNIFICANT CHANGE UP (ref 32–36)
MCV RBC AUTO: 88.8 FL — SIGNIFICANT CHANGE UP (ref 80–100)
MONOCYTES # BLD AUTO: 0.62 K/UL — SIGNIFICANT CHANGE UP (ref 0–0.9)
MONOCYTES NFR BLD AUTO: 6.4 % — SIGNIFICANT CHANGE UP (ref 2–14)
NEUTROPHILS # BLD AUTO: 5.17 K/UL — SIGNIFICANT CHANGE UP (ref 1.8–7.4)
NEUTROPHILS NFR BLD AUTO: 53.4 % — SIGNIFICANT CHANGE UP (ref 43–77)
PLATELET # BLD AUTO: 319 K/UL — SIGNIFICANT CHANGE UP (ref 150–400)
POTASSIUM SERPL-MCNC: 3.7 MMOL/L — SIGNIFICANT CHANGE UP (ref 3.5–5.3)
POTASSIUM SERPL-SCNC: 3.7 MMOL/L — SIGNIFICANT CHANGE UP (ref 3.5–5.3)
PROT SERPL-MCNC: 7.2 G/DL — SIGNIFICANT CHANGE UP (ref 6.6–8.7)
PROTHROM AB SERPL-ACNC: 12.2 SEC — SIGNIFICANT CHANGE UP (ref 10.5–13.4)
RBC # BLD: 4.81 M/UL — SIGNIFICANT CHANGE UP (ref 3.8–5.2)
RBC # FLD: 13.3 % — SIGNIFICANT CHANGE UP (ref 10.3–14.5)
SARS-COV-2 RNA SPEC QL NAA+PROBE: SIGNIFICANT CHANGE UP
SODIUM SERPL-SCNC: 138 MMOL/L — SIGNIFICANT CHANGE UP (ref 135–145)
TROPONIN T SERPL-MCNC: <0.01 NG/ML — SIGNIFICANT CHANGE UP (ref 0–0.06)
WBC # BLD: 9.68 K/UL — SIGNIFICANT CHANGE UP (ref 3.8–10.5)
WBC # FLD AUTO: 9.68 K/UL — SIGNIFICANT CHANGE UP (ref 3.8–10.5)

## 2022-08-29 PROCEDURE — 84484 ASSAY OF TROPONIN QUANT: CPT

## 2022-08-29 PROCEDURE — 93010 ELECTROCARDIOGRAM REPORT: CPT

## 2022-08-29 PROCEDURE — U0003: CPT

## 2022-08-29 PROCEDURE — 93005 ELECTROCARDIOGRAM TRACING: CPT

## 2022-08-29 PROCEDURE — 70496 CT ANGIOGRAPHY HEAD: CPT | Mod: MA

## 2022-08-29 PROCEDURE — 80053 COMPREHEN METABOLIC PANEL: CPT

## 2022-08-29 PROCEDURE — 70450 CT HEAD/BRAIN W/O DYE: CPT | Mod: MA

## 2022-08-29 PROCEDURE — U0005: CPT

## 2022-08-29 PROCEDURE — 99285 EMERGENCY DEPT VISIT HI MDM: CPT

## 2022-08-29 PROCEDURE — 36415 COLL VENOUS BLD VENIPUNCTURE: CPT

## 2022-08-29 PROCEDURE — 99204 OFFICE O/P NEW MOD 45 MIN: CPT

## 2022-08-29 PROCEDURE — 85730 THROMBOPLASTIN TIME PARTIAL: CPT

## 2022-08-29 PROCEDURE — 82962 GLUCOSE BLOOD TEST: CPT

## 2022-08-29 PROCEDURE — 99215 OFFICE O/P EST HI 40 MIN: CPT

## 2022-08-29 PROCEDURE — 86901 BLOOD TYPING SEROLOGIC RH(D): CPT

## 2022-08-29 PROCEDURE — 0042T: CPT | Mod: MA

## 2022-08-29 PROCEDURE — 99285 EMERGENCY DEPT VISIT HI MDM: CPT | Mod: 25

## 2022-08-29 PROCEDURE — 70498 CT ANGIOGRAPHY NECK: CPT | Mod: MA

## 2022-08-29 PROCEDURE — 86900 BLOOD TYPING SEROLOGIC ABO: CPT

## 2022-08-29 PROCEDURE — 85025 COMPLETE CBC W/AUTO DIFF WBC: CPT

## 2022-08-29 PROCEDURE — 70496 CT ANGIOGRAPHY HEAD: CPT | Mod: 26,MA

## 2022-08-29 PROCEDURE — 86850 RBC ANTIBODY SCREEN: CPT

## 2022-08-29 PROCEDURE — 85610 PROTHROMBIN TIME: CPT

## 2022-08-29 PROCEDURE — 70498 CT ANGIOGRAPHY NECK: CPT | Mod: 26,MA

## 2022-08-29 RX ORDER — FAMOTIDINE 10 MG/ML
20 INJECTION INTRAVENOUS DAILY
Refills: 0 | Status: DISCONTINUED | OUTPATIENT
Start: 2022-08-29 | End: 2022-09-03

## 2022-08-29 RX ORDER — CLOPIDOGREL BISULFATE 75 MG/1
75 TABLET, FILM COATED ORAL ONCE
Refills: 0 | Status: COMPLETED | OUTPATIENT
Start: 2022-08-29 | End: 2022-08-29

## 2022-08-29 RX ORDER — ASPIRIN/CALCIUM CARB/MAGNESIUM 324 MG
81 TABLET ORAL DAILY
Refills: 0 | Status: DISCONTINUED | OUTPATIENT
Start: 2022-08-29 | End: 2022-09-03

## 2022-08-29 RX ORDER — PANTOPRAZOLE SODIUM 20 MG/1
40 TABLET, DELAYED RELEASE ORAL AT BEDTIME
Refills: 0 | Status: DISCONTINUED | OUTPATIENT
Start: 2022-08-29 | End: 2022-09-03

## 2022-08-29 RX ORDER — NEBIVOLOL HYDROCHLORIDE 5 MG/1
5 TABLET ORAL DAILY
Refills: 0 | Status: DISCONTINUED | OUTPATIENT
Start: 2022-08-29 | End: 2022-09-03

## 2022-08-29 RX ADMIN — FAMOTIDINE 20 MILLIGRAM(S): 10 INJECTION INTRAVENOUS at 20:58

## 2022-08-29 RX ADMIN — PANTOPRAZOLE SODIUM 40 MILLIGRAM(S): 20 TABLET, DELAYED RELEASE ORAL at 20:57

## 2022-08-29 RX ADMIN — Medication 81 MILLIGRAM(S): at 20:58

## 2022-08-29 RX ADMIN — Medication 0.1 MILLIGRAM(S): at 20:58

## 2022-08-29 NOTE — ED PROVIDER NOTE - OBJECTIVE STATEMENT
61 y m with pmh of carotid stenosis presenting with slurred speech for a week. Was seen by PMD today and sent in for stroke RO. Outside window for thrombolytics. Does not have weakness 61 y m with pmh of carotid stenosis presenting with slurred speech for a week. Was seen by PMD today and sent in for stroke RO. Outside window for thrombolytics. Does not have weakness in UE or LE, no drift/AMS, HA, change in vision.

## 2022-08-29 NOTE — ED ADULT NURSE REASSESSMENT NOTE - NS ED NURSE REASSESS COMMENT FT1
Pt given extra food tray. Pt states once she has food in her stomach she will take her PO medications. NAD present.
Pt is resting in stretcher comfortably at this time, no apparent distress noted at this time. Pt safety maintained. Pt denies any complaints at this time.

## 2022-08-29 NOTE — ED ADULT NURSE NOTE - OBJECTIVE STATEMENT
Pt received A&Ox4 c/o slurred speech and unsteady gait x 1 week. Symptoms have resolved @ this time. Code stroke called. Pt received post CT scan. NIH in flow sheet. No neuro deficits @ this time. Respirations even & unlabored. NAD present. Pt ambulated to br with steady gait independently.

## 2022-08-29 NOTE — CONSULT NOTE ADULT - ASSESSMENT
IMPRESSION:  - R/O stroke Localization not clear         ASSESSMENT/ PLAN:     - Admit to inpatient hospitalist service or ED observation unit.  - Neuro checks and vital signs Q 4 hours.  - SBP goal permissive up to 180 for 24 hours then normotensive..  - ASA 81 mg PO or 300 PA QD.  - Lipitor for LDL goal of < 70 .  - Telemetry monitoring.  - CT/CTA/CTP -images and reports were reviewed.   - MRI Brain stroke protocol.  - Check fasting Lipid panel and HbA1c  - TTE with bubble study.  - PT OT SLP evaluation.  - SCD/ SQ Lovenox for DVT prophylaxis.

## 2022-08-29 NOTE — ED PROVIDER NOTE - ATTENDING CONTRIBUTION TO CARE
61F hx of ICA stenosis presenting with 1 week of on off neurologic symptoms that worsened yesterday, now presenting well outside the window for code stroke, though symptoms are concerning for critical stenosis or stroke that occurred this week. Will check CTA, consult neuro, tba given neurologic deficits.     I have personally performed a face to face diagnostic evaluation on this patient.  I have reviewed the resident's note and agree with the history, exam, and plan of care, except as noted.   My medical decision making and observations are found above.

## 2022-08-29 NOTE — CONSULT NOTE ADULT - SUBJECTIVE AND OBJECTIVE BOX
LSW few days ago       Neurology consult    AGUSTIN JACOBSONNIKOLAI 61y Female     Patient is a 61y old  Female who presents with a chief complaint of     HPI:      PMH:      PSH:       FAMILY HISTORY:      SOCIAL HISTORY:  No history of tobacco or alcohol use     Allergies    No Known Allergies    Intolerances        Height (cm): 170.2 (08-29 @ 13:34)  Weight (kg): 67.5 (08-29 @ 13:34)  BMI (kg/m2): 23.3 (08-29 @ 13:34)    Vital Signs Last 24 Hrs  T(C): 36 (29 Aug 2022 15:53), Max: 36.6 (29 Aug 2022 13:34)  T(F): 96.8 (29 Aug 2022 15:53), Max: 97.8 (29 Aug 2022 13:34)  HR: 49 (29 Aug 2022 15:53) (49 - 51)  BP: 174/76 (29 Aug 2022 15:53) (153/99 - 174/76)  BP(mean): --  RR: 18 (29 Aug 2022 15:53) (18 - 18)  SpO2: 98% (29 Aug 2022 15:53) (98% - 98%)    Parameters below as of 29 Aug 2022 15:53  Patient On (Oxygen Delivery Method): room air      MEDICATIONS          LABS:  CBC Full  -  ( 29 Aug 2022 15:35 )  WBC Count : 9.68 K/uL  RBC Count : 4.81 M/uL  Hemoglobin : 14.7 g/dL  Hematocrit : 42.7 %  Platelet Count - Automated : 319 K/uL  Mean Cell Volume : 88.8 fl  Mean Cell Hemoglobin : 30.6 pg  Mean Cell Hemoglobin Concentration : 34.4 gm/dL  Auto Neutrophil # : 5.17 K/uL  Auto Lymphocyte # : 3.50 K/uL  Auto Monocyte # : 0.62 K/uL  Auto Eosinophil # : 0.30 K/uL  Auto Basophil # : 0.07 K/uL  Auto Neutrophil % : 53.4 %  Auto Lymphocyte % : 36.2 %  Auto Monocyte % : 6.4 %  Auto Eosinophil % : 3.1 %  Auto Basophil % : 0.7 %      08-29    138  |  103  |  11.8  ----------------------------<  90  3.7   |  26.0  |  0.72    Ca    9.0      29 Aug 2022 15:35    TPro  7.2  /  Alb  4.1  /  TBili  0.3<L>  /  DBili  x   /  AST  19  /  ALT  15  /  AlkPhos  86  08-29    LIVER FUNCTIONS - ( 29 Aug 2022 15:35 )  Alb: 4.1 g/dL / Pro: 7.2 g/dL / ALK PHOS: 86 U/L / ALT: 15 U/L / AST: 19 U/L / GGT: x           Hemoglobin A1C:       PT/INR - ( 29 Aug 2022 15:35 )   PT: 12.2 sec;   INR: 1.05 ratio         PTT - ( 29 Aug 2022 15:35 )  PTT:30.3 sec      On Neurological Examination:    Mental Status - Patient is  awake, alert and oriented X3.  Speech is fluent. Patient can name, repeat and follow commands correctly  There is no dysarthria.    Cranial Nerves - PERRL, EOMI,  Visual fields are full to finger counting, no gross facial asymmetry, tongue/uvula midline    Motor Exam -   Right upper ---5/5 No drift  Left upper ---5/5 No drift  Right lower ---5/5 No drift  Left lower  ---5/5 No drift     nml bulk/tone    Sensory    Intact to light touch and pinprick bilaterally    Coord: FTN intact bilaterally     Gait -  Not assessed.     NIH SS:   Date:   Time:   1a) Level of consciousness (0-3):   1b) Questions (0-2):   1c) Commands (0-2):   2  ) Gaze (0-2):   3  ) Visual field (0-3):   4  ) Facial palsy (0-3):   Motor  5a) Left arm (0-4):   5b) Right arm (0-4):   6a) Left leg (0-4):   6b) Right leg (0-4):   7  ) Ataxia (0-2):   Sensory  8  ) Sensory (0-2):   Speech  9  ) Language (0-3):   10) Dysarthria (0-2):   Extinction  11) Extinction/inattention (0-2):     Total score: =    Patient was last seen well  few days ago.  Prestroke Modified Ingrid: 0        RADIOLOGY ( All neurological imaging studies were independently reviewed and interpreted by me)  [facility]   < from: CT Brain Stroke Protocol (08.29.22 @ 13:59) >    IMPRESSION: No evidence of acute hemorrhage mass or mass effect.    Findings discussed with Dr Baptiste  on, Aug 29 2022 2:05 PM  with read   back.    ALEXA PAT MD; Attending Radiologist      CTA  CTP  < from: CT Angio Neck Stroke Protocol w/ IV Cont (08.29.22 @ 14:25) >  IMPRESSION: Mild narrowing of the proximal left internal carotid artery   is seen.    Decrease flow related enhancement involving the right M2 and M3 segments.    ALEXA PAT MD; Attending Radiologist      MRI:  TTE                  LSW few days ago       Neurology consult    AGUSTIN FLOR 61y Female     Patient is a 61y old  Female who presents with a chief complaint of     HPI:  61 y m with pmh of carotid stenosis presenting with slurred speech for a week. Was seen by PMD today and sent in for stroke RO. Outside window for thrombolytics. Does not have weakness in UE or LE, no drift/AMS, HA, change in vision.    PMH:      PSH:       FAMILY HISTORY:      SOCIAL HISTORY:  No history of tobacco or alcohol use     Allergies    No Known Allergies    Intolerances        Height (cm): 170.2 (08-29 @ 13:34)  Weight (kg): 67.5 (08-29 @ 13:34)  BMI (kg/m2): 23.3 (08-29 @ 13:34)          Vital Signs Last 24 Hrs  T(C): 36.5 (29 Aug 2022 19:36), Max: 36.6 (29 Aug 2022 13:34)  T(F): 97.7 (29 Aug 2022 19:36), Max: 97.8 (29 Aug 2022 13:34)  HR: 51 (29 Aug 2022 19:36) (49 - 51)  BP: 208/99 (29 Aug 2022 19:36) (153/99 - 208/99)  BP(mean): 135 (29 Aug 2022 19:36) (135 - 135)  RR: 18 (29 Aug 2022 19:36) (18 - 18)  SpO2: 99% (29 Aug 2022 19:36) (98% - 99%)    Parameters below as of 29 Aug 2022 19:36  Patient On (Oxygen Delivery Method): room air        MEDICATIONS    aspirin enteric coated 81 milliGRAM(s) Oral daily         LABS:  CBC Full  -  ( 29 Aug 2022 15:35 )  WBC Count : 9.68 K/uL  RBC Count : 4.81 M/uL  Hemoglobin : 14.7 g/dL  Hematocrit : 42.7 %  Platelet Count - Automated : 319 K/uL  Mean Cell Volume : 88.8 fl  Mean Cell Hemoglobin : 30.6 pg  Mean Cell Hemoglobin Concentration : 34.4 gm/dL  Auto Neutrophil # : 5.17 K/uL  Auto Lymphocyte # : 3.50 K/uL  Auto Monocyte # : 0.62 K/uL  Auto Eosinophil # : 0.30 K/uL  Auto Basophil # : 0.07 K/uL  Auto Neutrophil % : 53.4 %  Auto Lymphocyte % : 36.2 %  Auto Monocyte % : 6.4 %  Auto Eosinophil % : 3.1 %  Auto Basophil % : 0.7 %      08-29    138  |  103  |  11.8  ----------------------------<  90  3.7   |  26.0  |  0.72    Ca    9.0      29 Aug 2022 15:35    TPro  7.2  /  Alb  4.1  /  TBili  0.3<L>  /  DBili  x   /  AST  19  /  ALT  15  /  AlkPhos  86  08-29    LIVER FUNCTIONS - ( 29 Aug 2022 15:35 )  Alb: 4.1 g/dL / Pro: 7.2 g/dL / ALK PHOS: 86 U/L / ALT: 15 U/L / AST: 19 U/L / GGT: x           Hemoglobin A1C:       PT/INR - ( 29 Aug 2022 15:35 )   PT: 12.2 sec;   INR: 1.05 ratio         PTT - ( 29 Aug 2022 15:35 )  PTT:30.3 sec  On Neurological Examination:    Mental Status - Patient is  awake, alert and oriented X3.  Speech is fluent. Patient can name, repeat and follow commands correctly  There is no dysarthria.    Cranial Nerves - PERRL, EOMI,  Visual fields are full to finger counting, no gross facial asymmetry, tongue/uvula midline    Motor Exam -   Right upper ---5/5 No drift  Left upper ---5/5 No drift  Right lower ---5/5 No drift  Left lower  ---5/5 No drift     nml bulk/tone    Sensory    Intact to light touch and pinprick bilaterally    Coord: FTN intact bilaterally     Gait -  Not assessed.     [facility] SS:   Date: 08/29/22  Time: 0068  1a) Level of consciousness (0-3):   1b) Questions (0-2):   1c) Commands (0-2):   2  ) Gaze (0-2):   3  ) Visual field (0-3):   4  ) Facial palsy (0-3):   Motor  5a) Left arm (0-4):   5b) Right arm (0-4):   6a) Left leg (0-4):   6b) Right leg (0-4):   7  ) Ataxia (0-2):   Sensory  8  ) Sensory (0-2):   Speech  9  ) Language (0-3):   10) Dysarthria (0-2):   Extinction  11) Extinction/inattention (0-2):     Total score: = 0    Patient was last seen well  few days ago.  Prestroke Modified [place]: 0        RADIOLOGY ( All neurological imaging studies were independently reviewed and interpreted by me)  [facility]   CT Brain Stroke Protocol (08.29.22 @ 13:59) >    IMPRESSION: No evidence of acute hemorrhage mass or mass effect.    Findings discussed with Dr Baptiste  on, Aug 29 2022 2:05 PM  with read   back.    ALEXA PAT MD; Attending Radiologist      CTA  CTP  CT Angio Neck Stroke Protocol w/ IV Cont (08.29.22 @ 14:25) >  IMPRESSION: Mild narrowing of the proximal left internal carotid artery   is seen.    Decrease flow related enhancement involving the right M2 and M3 segments.    ALEXA PAT MD; Attending Radiologist      MRI:      TTE                  LSW few days ago

## 2022-08-29 NOTE — ED PROVIDER NOTE - PROGRESS NOTE DETAILS
Bertha: Patient's condition necessitates IV contrast prior to return of labs given time sensitivity. Risks outweigh the benefits. Bertha: Continues to have no slurred speech on assessment. Will get MRI and stroke workup. Discussed with Dr Colbert. Bertha: Pt was did not want to stay but convinced to stay for MRI. Eloped while away from bed.

## 2022-08-29 NOTE — ED PROVIDER NOTE - PHYSICAL EXAMINATION
General: well appearing, NAD  Head: NC, AT  EENT: EOMI, no scleral icterus  Cardiac: RRR, no apparent murmurs, no lower extremity edema  Respiratory: CTABL, no respiratory distress   Abdomen: soft, ND, NT, nonperitonitic  MSK/Vascular: full ROM, soft compartments, warm extremities  Neuro: AAOx3, sensation to light touch intact, 5/5 upper/lower extremities, formal finger to nose, no UE/LE drift, No facial droop.  Psych: calm, cooperative

## 2022-08-29 NOTE — HISTORY OF PRESENT ILLNESS
[FreeTextEntry1] : Pt is here for vertigo concerns. [de-identified] : 60 y/o female with pmhx graves disease, hld, htn, presents for concern with balance. Patient states that she is here to talk about her lab results from her endocrinologist. States she has issues with sweating and potassium levels being off. When asked about the concern with dizziness, she states she has realized her balance has seemed off recently. She is unsure how long this has been going on for. She is unable to provide any more detail about this concern. States she feels incredibly tired as well. \par \par Patients daughter was called to the office to be with patient as she seems to have slurred speech and difficulty with her thought process. Other staff members have also noticed that she does not seem like herself. Patients daughter states that she has noticed she has not been acting normally for the past week. She has slurring of her speech with some mild left sided facial droop. As per daughter left sided droop has been there for some time now, but the slurring of her speech is new. \par \par As per patient she went to [facility] over a year ago for possible CVA, but left AMA as they did not have a working mri machine.  Patient states she has seen neuro in the past and has been told she has had past minor CVAs.

## 2022-08-29 NOTE — ASSESSMENT
[FreeTextEntry1] : R/o CVA\par pt with slurred speech, left-sided facial droop, unsteady gait, tangential thought process, and lethargy\par Patient refusing transfer by ambulance, patient gave permission to call daughter to come to office.\par Daughter states that her mom has been acting not herself for the past week\par Advised patient and daughter of the importance of being evaluated in the emergency room for a possible CVA especially with history of previous CVA\par Advised that it would be in their best interest to be transferred by ambulance, patient and daughter refusing\par Daughter agrees to take patient to [facility]\par Called [facility] to inform them of patients anticipated arrival

## 2022-08-29 NOTE — REVIEW OF SYSTEMS
[Dizziness] : dizziness [Confusion] : confusion [Unsteady Walking] : ataxia [Fever] : no fever [Chills] : no chills [Night Sweats] : no night sweats [Vision Problems] : no vision problems [Chest Pain] : no chest pain [Palpitations] : no palpitations [Lower Ext Edema] : no lower extremity edema [Shortness Of Breath] : no shortness of breath [Wheezing] : no wheezing [Cough] : no cough [Abdominal Pain] : no abdominal pain [Nausea] : no nausea [Constipation] : no constipation [Diarrhea] : diarrhea [Vomiting] : no vomiting [Headache] : no headache

## 2022-08-29 NOTE — ED ADULT TRIAGE NOTE - CHIEF COMPLAINT QUOTE
pt states she was sent by MD for r/o stroke, states she is off balance, slurred speech,   A&Ox3, resp wnl, onset 1 week ago

## 2022-08-29 NOTE — PHYSICAL EXAM
[No Acute Distress] : no acute distress [Well Nourished] : well nourished [Cranial Nerves Optic (II)] : visual acuity and visual fields were intact [Cranial Nerves Oculomotor (III)] : the extraocular motions were intact [Cranial Nerves Trigeminal (V)] : sensation to the face and masseter strength were intact [Cranial Nerves Vestibulocochlear (VIII)] : hearing was intact [Cranial Nerves Glossopharyngeal (IX)] : there was normal movement of the soft palate and normal gag [Cranial Nerves Accessory (XI - Cranial And Spinal)] : shoulder shrug was intact bilaterally [Cranial Nerves Hypoglossal (XII)] : there was no tongue deviation with protrusion [Normal] : the sensory exam was normal [Tangentiality] : tangentiality [Romberg's Sign] : Romberg's sign was negtive [Normal Articulation] : abnormal articulation [Normal Fluency] : not fluent [de-identified] : left sided facial drrop, unsteady gait [de-identified] : slurred speech

## 2022-08-29 NOTE — ED PROVIDER NOTE - NS ED ROS FT
Constitutional: no fever, no chills  Head: NC, AT   Eyes: no redness   ENMT: no nasal congestion/drainage, no sore throat   CV: no chest pain, no edema  Resp: no cough, no dyspnea  GI: no abdominal pain, no nausea, no vomiting, no diarrhea  : no dysuria, no hematuria   Skin: no lesions, no rashes   Neuro: no LOC, no headache, no sensory deficits, no weakness, Slurred speech

## 2022-08-29 NOTE — ED ADULT NURSE NOTE - NS ED NURSE ELOPE COMMENTS
Pt ripped out IV and stated "I am not staying over night for an MRI, I have digestive issues and want to go home". MD Stone made aware and called to bedside. However, pt ripped IV out and walked out door.

## 2022-08-29 NOTE — ED PROVIDER NOTE - CLINICAL SUMMARY MEDICAL DECISION MAKING FREE TEXT BOX
Presenting with slurred speech. No TPA at this time give outside of window. Will get stroke workup and reassess.

## 2022-08-30 ENCOUNTER — NON-APPOINTMENT (OUTPATIENT)
Age: 62
End: 2022-08-30

## 2022-09-06 ENCOUNTER — NON-APPOINTMENT (OUTPATIENT)
Age: 62
End: 2022-09-06

## 2022-09-06 PROBLEM — Z78.9 OTHER SPECIFIED HEALTH STATUS: Chronic | Status: ACTIVE | Noted: 2022-08-29

## 2022-09-07 ENCOUNTER — APPOINTMENT (OUTPATIENT)
Dept: VASCULAR SURGERY | Facility: CLINIC | Age: 62
End: 2022-09-07

## 2022-09-07 VITALS
SYSTOLIC BLOOD PRESSURE: 168 MMHG | OXYGEN SATURATION: 97 % | TEMPERATURE: 98.7 F | BODY MASS INDEX: 24.99 KG/M2 | DIASTOLIC BLOOD PRESSURE: 95 MMHG | HEART RATE: 83 BPM | HEIGHT: 65 IN | WEIGHT: 150 LBS

## 2022-09-07 VITALS — DIASTOLIC BLOOD PRESSURE: 94 MMHG | SYSTOLIC BLOOD PRESSURE: 152 MMHG

## 2022-09-07 DIAGNOSIS — I63.9 CEREBRAL INFARCTION, UNSPECIFIED: ICD-10-CM

## 2022-09-07 PROCEDURE — 99203 OFFICE O/P NEW LOW 30 MIN: CPT

## 2022-09-07 PROCEDURE — 93880 EXTRACRANIAL BILAT STUDY: CPT

## 2022-09-07 NOTE — PHYSICAL EXAM
[2+] : left 2+ [Right Carotid Bruit] : no bruit heard over the right carotid [Left Carotid Bruit] : no bruit heard over the left carotid [de-identified] : NAD. well appearing

## 2022-09-07 NOTE — ASSESSMENT
[FreeTextEntry1] : 63 yo female with less than 50% stenosis in bilateral internal carotid arteries \par \par Pt counseled on results of duplex and above diagnosis.\par Pt counseled on smoking cessation \par Continue aspirin \par RTC in 1 year to monitor for carotid stenosis \par \par A total of 30 minutes was spent with patient and coordinating care.\par

## 2022-09-07 NOTE — REASON FOR VISIT
[Initial Evaluation] : an initial evaluation [FreeTextEntry1] : screening for carotid artery stenosis

## 2022-09-07 NOTE — HISTORY OF PRESENT ILLNESS
[FreeTextEntry1] : 63 yo female PMHx HLD, HTN, hypothyroid, presents today for evaluation of carotid artery stenosis. Pt states she was recently hospitalized for rule out stroke but left AMA. She denies any residual side effects. denies facial droop, weakness on one side of the body, slurred speech, vision loss. Her BP has been better controlled since discharge home. Pt smokes 1 pack of cigarettes per day.

## 2022-09-09 ENCOUNTER — NON-APPOINTMENT (OUTPATIENT)
Age: 62
End: 2022-09-09

## 2022-09-09 ENCOUNTER — APPOINTMENT (OUTPATIENT)
Dept: CARDIOLOGY | Facility: CLINIC | Age: 62
End: 2022-09-09

## 2022-09-09 VITALS
HEART RATE: 58 BPM | RESPIRATION RATE: 16 BRPM | HEIGHT: 65 IN | WEIGHT: 147 LBS | DIASTOLIC BLOOD PRESSURE: 80 MMHG | SYSTOLIC BLOOD PRESSURE: 132 MMHG | BODY MASS INDEX: 24.49 KG/M2

## 2022-09-09 DIAGNOSIS — E07.9 DISORDER OF THYROID, UNSPECIFIED: ICD-10-CM

## 2022-09-09 DIAGNOSIS — G47.30 SLEEP APNEA, UNSPECIFIED: ICD-10-CM

## 2022-09-09 DIAGNOSIS — Z82.49 FAMILY HISTORY OF ISCHEMIC HEART DISEASE AND OTHER DISEASES OF THE CIRCULATORY SYSTEM: ICD-10-CM

## 2022-09-09 DIAGNOSIS — Z82.61 FAMILY HISTORY OF ARTHRITIS: ICD-10-CM

## 2022-09-09 DIAGNOSIS — Z82.5 FAMILY HISTORY OF ASTHMA AND OTHER CHRONIC LOWER RESPIRATORY DISEASES: ICD-10-CM

## 2022-09-09 PROCEDURE — 93000 ELECTROCARDIOGRAM COMPLETE: CPT

## 2022-09-09 PROCEDURE — 99214 OFFICE O/P EST MOD 30 MIN: CPT | Mod: 25

## 2022-09-09 RX ORDER — GRISEOFULVIN 125 MG/1
125 TABLET ORAL
Qty: 90 | Refills: 0 | Status: DISCONTINUED | COMMUNITY
Start: 2022-03-11

## 2022-09-09 RX ORDER — AMOXICILLIN AND CLAVULANATE POTASSIUM 875; 125 MG/1; MG/1
875-125 TABLET, COATED ORAL
Qty: 20 | Refills: 0 | Status: DISCONTINUED | COMMUNITY
Start: 2022-05-18

## 2022-09-09 RX ORDER — FLUOCINONIDE 0.5 MG/ML
0.05 SOLUTION TOPICAL
Qty: 60 | Refills: 0 | Status: DISCONTINUED | COMMUNITY
Start: 2022-06-27

## 2022-09-09 RX ORDER — PREDNISOLONE ACETATE 10 MG/ML
1 SUSPENSION/ DROPS OPHTHALMIC
Qty: 5 | Refills: 0 | Status: DISCONTINUED | COMMUNITY
Start: 2022-06-20

## 2022-09-09 RX ORDER — BETAMETHASONE DIPROPIONATE 0.5 MG/G
0.05 CREAM TOPICAL
Qty: 15 | Refills: 0 | Status: DISCONTINUED | COMMUNITY
Start: 2022-05-13

## 2022-09-09 RX ORDER — BIOTIN 10 MG
TABLET ORAL
Refills: 0 | Status: DISCONTINUED | COMMUNITY

## 2022-09-09 RX ORDER — BENZONATATE 100 MG/1
100 CAPSULE ORAL
Qty: 30 | Refills: 0 | Status: DISCONTINUED | COMMUNITY
Start: 2022-05-18

## 2022-09-09 RX ORDER — NEBIVOLOL HYDROCHLORIDE 5 MG/1
5 TABLET ORAL
Refills: 0 | Status: DISCONTINUED | COMMUNITY

## 2022-09-09 NOTE — DISCUSSION/SUMMARY
[FreeTextEntry1] : Patient is a 60yo F with HTN, minimal carotid disease, MR, GERD, collagenous colitis, RVOT PVC's here for cardiac follow up. \par - Secondary work up of HTN with equivocal findings. May just have low renin HTN and very salt sensitive, done in 2020 . Component of anxiety too \par -Pulmonary suspects dyspnea from chronotropic incompetence, doubts COPD. Does have untreated AARON\par -Echo and pharm nuclear stress test unremarkable spring 2022\par -Component anxiety causing labile BP, will try adding additional bystolic 2.5mg in mornings. ALso intermittent back pain/sciatica leading to increased BP. STates deep breathing helps BP\par \par \par 1. Add am Bystolic 2.5mg inaddition to 5mg qhs dose as well as clonidine\par 2. Continue ASA\par 3. Continue zetia , has refused statins and tried with side effects of cramps. \par 4. Recommend aggressive diet and lifestyle modifications , very low salt diet (2gm or less) \par 5. Counselled for more than 3 minutes on smoking cessation \par 6. Will plan MRI to evaluate pheo given equivocal metanephrines and continued labile BP\par 7. Follow up 2-3 months\par 8. REcommend she use her CPAP for AARON

## 2022-09-09 NOTE — ADDENDUM
[FreeTextEntry1] : 4/19/2022: . Plan as above, follow up in May when returns from being out of town.

## 2022-09-09 NOTE — HISTORY OF PRESENT ILLNESS
[FreeTextEntry1] : Patient is a 63yo F with HTN, mild carotid disease, MR, GERD, collagenous colitis, RVOT PVC's here for cardiac follow up. Has struggled with HTN and medication tolerance over years. WEnt to hospital end of August due to slurred speech and concern for CVA. ADvised admission for further evaluation but left AMA. BP at home since has been labile. lowest 111/68 and up to 190s systolic. Getting "hot flashes". Biggest concern now is her BP she states. No CP/SOB. Patient denies PND/orthopnea/edema/palpitations/syncope/claudication. Spends most of her time at home, remains anxious about pandemic. \par \par \par Lives with  and dog. 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 Enjoys working in garden when weather better. \par \par \par \par ROS: Neuro and  negative

## 2022-09-09 NOTE — ASSESSMENT
[FreeTextEntry1] : ECG: SR, inferior and lateral ST depressions (unchanged)\par \par  HDL 55   (2/2020)\par CMP/Mg/amylase/lipase unremarkable, glucose 108\par Serum normetaneprhine 196 (upper limit  normal 146), metanephrine 28 (normal) \par Renin Activity Plasma < 0.167\par TSH 2.32\par Cortisol 10\par Calcium 9.6, PTH 48\par Aldosterone 3.8 \par \par ECHO 6/2020:\par 1. Normal LV size, systolic and diastolic function. EF 55-60%\par 2. Normal RV/LA/RA \par 3. Mild MR\par \par RENAL DUPLEX 1/2020: No evidence stenosis bilaterally \par \par REVA/PVR 2/2020:\par 1. R 0.97 L 1.03\par 2. Normal PVR\par \par PHARM NUCLEAR STRESS TEST 5/2019:\par 1. Negative for ischemia, EF 72%\par 2. BP hypertensive at baseline with normal response\par \par ECHO 5/2019:\par 1. Mildly increased LV wall thickness\par 2. Normal LV function, EF 65-70%\par 3. Mildly increased RV thickness\par 4. Trivial pericardial effusion\par \par CAROTID DUPLEX 5/2019\par 1. No hemodynamically significant stenosis\par 2. antegrade flow in vertebral arteries

## 2022-09-14 ENCOUNTER — NON-APPOINTMENT (OUTPATIENT)
Age: 62
End: 2022-09-14

## 2022-09-14 RX ORDER — NEBIVOLOL 2.5 MG/1
2.5 TABLET ORAL
Qty: 30 | Refills: 5 | Status: DISCONTINUED | COMMUNITY
Start: 2022-09-09 | End: 2022-09-14

## 2022-09-20 ENCOUNTER — RX RENEWAL (OUTPATIENT)
Age: 62
End: 2022-09-20

## 2022-09-28 ENCOUNTER — APPOINTMENT (OUTPATIENT)
Dept: PULMONOLOGY | Facility: CLINIC | Age: 62
End: 2022-09-28

## 2022-09-28 ENCOUNTER — NON-APPOINTMENT (OUTPATIENT)
Age: 62
End: 2022-09-28

## 2022-09-28 VITALS
HEART RATE: 60 BPM | DIASTOLIC BLOOD PRESSURE: 82 MMHG | SYSTOLIC BLOOD PRESSURE: 140 MMHG | BODY MASS INDEX: 24.16 KG/M2 | OXYGEN SATURATION: 98 % | RESPIRATION RATE: 14 BRPM | HEIGHT: 65 IN | WEIGHT: 145 LBS

## 2022-09-28 PROCEDURE — 99214 OFFICE O/P EST MOD 30 MIN: CPT | Mod: 25

## 2022-09-28 PROCEDURE — G0296 VISIT TO DETERM LDCT ELIG: CPT

## 2022-09-28 PROCEDURE — 99406 BEHAV CHNG SMOKING 3-10 MIN: CPT

## 2022-09-28 NOTE — HISTORY OF PRESENT ILLNESS
[TextBox_4] : Patient is a 40-pack-year smoker continuing to smoke about 1 pack of cigarettes per day.  She denies shortness of breath.  She has mild obstructive sleep apnea and has been intolerant of CPAP.  Consequently she has not been using it and has excessive daytime sleepiness.  Additionally she has MRI evidence of strokes.

## 2022-09-28 NOTE — REASON FOR VISIT
[Follow-Up] : a follow-up visit [Sleep Apnea] : sleep apnea [Nicotine Dependence] : nicotine dependence

## 2022-09-28 NOTE — COUNSELING
[Cessation strategies including cessation program discussed] : Cessation strategies including cessation program discussed [Use of nicotine replacement therapies and other medications discussed] : Use of nicotine replacement therapies and other medications discussed [Encouraged to pick a quit date and identify support needed to quit] : Encouraged to pick a quit date and identify support needed to quit [Smoking Cessation Program Referral] : Smoking Cessation Program Referral  [No] : Not willing to quit smoking [ - Annual Lung Cancer Screening/Share Decision Making Discussion] : Annual Lung Cancer Screening/Share Decision Making Discussion. (I have advised this patient to have a Low Dose CT (LDCT) scan of the lungs and have discussed the following with the patient in a shared decision making discussion:   Benefits of Detection and Early Treatment: There is adequate evidence that annual screening for lung cancer with LDCT in a population of high-risk persons can prevent a substantial number of lung cancer–related deaths. The magnitude of benefit depends on the individual patient's risk for lung cancer, as those who are at highest risk are most likely to benefit. Screening cannot prevent most lung cancer–related deaths, and does not replace smoking cessation. Harms of Detection and Early Intervention and Treatment: The harms associated with LDCT screening include false-negative and false-positive results, incidental findings, over diagnosis, and radiation exposure. False-positive LDCT results occur in a substantial proportion of screened persons; 95% of all positive results do not lead to a diagnosis of cancer. In a high-quality screening program, further imaging can resolve most false-positive results; however, some patients may require invasive procedures. Radiation harms, including cancer resulting from cumulative exposure to radiation, vary depending on the age at the start of screening; the number of scans received; and the person's exposure to other sources of radiation, particularly other medical imaging.) [FreeTextEntry1] : 5

## 2022-09-28 NOTE — ASSESSMENT
[FreeTextEntry1] : Mild obstructive sleep apnea intolerant of CPAP.  Dental referral will be made for oral appliance therapy.\par \par Low-dose screening lung CT will be done for cancer screening.  This will be followed by phone.\par \par Patient will return after the oral appliance is fitted for a repeat home sleep study with the appliance in place.

## 2022-09-30 ENCOUNTER — NON-APPOINTMENT (OUTPATIENT)
Age: 62
End: 2022-09-30

## 2022-10-07 ENCOUNTER — NON-APPOINTMENT (OUTPATIENT)
Age: 62
End: 2022-10-07

## 2022-10-07 DIAGNOSIS — F17.200 NICOTINE DEPENDENCE, UNSPECIFIED, UNCOMPLICATED: ICD-10-CM

## 2022-10-08 VITALS — BODY MASS INDEX: 24.16 KG/M2 | WEIGHT: 145 LBS | HEIGHT: 65 IN

## 2022-10-08 PROBLEM — F17.200 NICOTINE DEPENDENCE: Noted: 2020-07-14

## 2022-10-08 NOTE — HISTORY OF PRESENT ILLNESS
[Current] : Current [TextBox_13] : Referred by Dr. Nata Galvan.\par \par Ms. FLOR is a 62 year old female with a history of HTN and high cholesterol. Reviewed and confirmed that the patient meets screening eligibility criteria:\par \par 62 years old \par \par Smoking Status: Current Smoker\par \par Number of pack(s) per day: 1\par Number of years smoked: 40\par Number of pack years smokin\par \par No symptoms of lung cancer, including new cough, change in cough, hemoptysis, and unintentional weight loss.\par \par No personal history of lung cancer.  No lung cancer in a first degree relative. No history of lung disease or occupational exposures. [PacksperDay] : 1 [N_Years] : 40 [PacksperYear] : 40

## 2022-10-10 ENCOUNTER — OUTPATIENT (OUTPATIENT)
Dept: OUTPATIENT SERVICES | Facility: HOSPITAL | Age: 62
LOS: 1 days | End: 2022-10-10

## 2022-10-10 ENCOUNTER — APPOINTMENT (OUTPATIENT)
Dept: CT IMAGING | Facility: CLINIC | Age: 62
End: 2022-10-10
Payer: COMMERCIAL

## 2022-10-10 ENCOUNTER — APPOINTMENT (OUTPATIENT)
Dept: MRI IMAGING | Facility: CLINIC | Age: 62
End: 2022-10-10
Payer: COMMERCIAL

## 2022-10-10 DIAGNOSIS — I10 ESSENTIAL (PRIMARY) HYPERTENSION: ICD-10-CM

## 2022-10-10 PROCEDURE — 74183 MRI ABD W/O CNTR FLWD CNTR: CPT | Mod: 26

## 2022-10-10 PROCEDURE — 71271 CT THORAX LUNG CANCER SCR C-: CPT | Mod: 26

## 2022-10-14 ENCOUNTER — NON-APPOINTMENT (OUTPATIENT)
Age: 62
End: 2022-10-14

## 2022-11-03 RX ORDER — KIT FOR THE PREPARATION OF TECHNETIUM TC99M SESTAMIBI 1 MG/5ML
INJECTION, POWDER, LYOPHILIZED, FOR SOLUTION PARENTERAL
Refills: 0 | Status: COMPLETED | OUTPATIENT
Start: 2022-11-03

## 2022-11-03 RX ADMIN — KIT FOR THE PREPARATION OF TECHNETIUM TC99M SESTAMIBI 0: 1 INJECTION, POWDER, LYOPHILIZED, FOR SOLUTION PARENTERAL at 00:00

## 2022-11-07 LAB
ALBUMIN SERPL ELPH-MCNC: 4.2 G/DL
ALP BLD-CCNC: 84 U/L
ALT SERPL-CCNC: 23 U/L
ANION GAP SERPL CALC-SCNC: 10 MMOL/L
AST SERPL-CCNC: 16 U/L
BASOPHILS # BLD AUTO: 0.08 K/UL
BASOPHILS NFR BLD AUTO: 0.8 %
BILIRUB SERPL-MCNC: 0.4 MG/DL
BUN SERPL-MCNC: 14 MG/DL
CALCIUM SERPL-MCNC: 9.8 MG/DL
CHLORIDE SERPL-SCNC: 104 MMOL/L
CHOLEST SERPL-MCNC: 242 MG/DL
CO2 SERPL-SCNC: 26 MMOL/L
CREAT SERPL-MCNC: 0.85 MG/DL
EGFR: 76 ML/MIN/1.73M2
EOSINOPHIL # BLD AUTO: 0.33 K/UL
EOSINOPHIL NFR BLD AUTO: 3.2 %
ESTIMATED AVERAGE GLUCOSE: 123 MG/DL
GLUCOSE SERPL-MCNC: 92 MG/DL
HBA1C MFR BLD HPLC: 5.9 %
HCT VFR BLD CALC: 46.9 %
HDLC SERPL-MCNC: 60 MG/DL
HGB BLD-MCNC: 15.3 G/DL
IMM GRANULOCYTES NFR BLD AUTO: 0.6 %
LDLC SERPL CALC-MCNC: 151 MG/DL
LYMPHOCYTES # BLD AUTO: 4.13 K/UL
LYMPHOCYTES NFR BLD AUTO: 39.6 %
MAN DIFF?: NORMAL
MCHC RBC-ENTMCNC: 30.3 PG
MCHC RBC-ENTMCNC: 32.6 GM/DL
MCV RBC AUTO: 92.9 FL
MONOCYTES # BLD AUTO: 0.86 K/UL
MONOCYTES NFR BLD AUTO: 8.2 %
NEUTROPHILS # BLD AUTO: 4.98 K/UL
NEUTROPHILS NFR BLD AUTO: 47.6 %
NONHDLC SERPL-MCNC: 182 MG/DL
PLATELET # BLD AUTO: 427 K/UL
POTASSIUM SERPL-SCNC: 4.3 MMOL/L
PROT SERPL-MCNC: 7.3 G/DL
RBC # BLD: 5.05 M/UL
RBC # FLD: 13.5 %
SODIUM SERPL-SCNC: 140 MMOL/L
T4 FREE SERPL-MCNC: 1.2 NG/DL
TRIGL SERPL-MCNC: 157 MG/DL
TSH SERPL-ACNC: 1 UIU/ML
WBC # FLD AUTO: 10.44 K/UL

## 2022-11-10 ENCOUNTER — NON-APPOINTMENT (OUTPATIENT)
Age: 62
End: 2022-11-10

## 2022-11-11 ENCOUNTER — APPOINTMENT (OUTPATIENT)
Dept: CARDIOLOGY | Facility: CLINIC | Age: 62
End: 2022-11-11

## 2022-11-11 ENCOUNTER — NON-APPOINTMENT (OUTPATIENT)
Age: 62
End: 2022-11-11

## 2022-11-11 VITALS
DIASTOLIC BLOOD PRESSURE: 69 MMHG | HEIGHT: 65 IN | RESPIRATION RATE: 14 BRPM | OXYGEN SATURATION: 96 % | WEIGHT: 149 LBS | BODY MASS INDEX: 24.83 KG/M2 | SYSTOLIC BLOOD PRESSURE: 121 MMHG | HEART RATE: 54 BPM

## 2022-11-11 PROCEDURE — 99214 OFFICE O/P EST MOD 30 MIN: CPT | Mod: 25

## 2022-11-11 PROCEDURE — 93000 ELECTROCARDIOGRAM COMPLETE: CPT

## 2022-11-11 NOTE — HISTORY OF PRESENT ILLNESS
[FreeTextEntry1] : Patient is a 61yo F with HTN, mild carotid disease, MR, GERD, collagenous colitis, RVOT PVC's here for cardiac follow up. Has struggled with HTN and medication tolerance over years. \par \par Denies CP but noting mild dyspnea recently. Patient denies PND/orthopnea/edema/palpitations/syncope/claudication. MRI done to [facility]. Also advised additional bystolic 2.5mg qam for better BP control. Noting occasional palp at night\par \par \par Lives with  and dog. 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 Enjoys working in garden when weather better. \par \par \par \par ROS: Neuro and  negative

## 2022-11-11 NOTE — ASSESSMENT
[FreeTextEntry1] : ECG: SR, inferior and lateral ST depressions (unchanged)\par \par  HDL 60   (10/2022) \par  HDL 55   (2/2020)\par CMP unremarkable 10/2022, A1C 5.9 (10/2022) \par \par Serum normetaneprhine 196 (upper limit  normal 146), metanephrine 28 (normal) \par Renin Activity Plasma < 0.167\par TSH 2.32\par Cortisol 10\par Calcium 9.6, PTH 48\par Aldosterone 3.8 \par \par ECHO 6/2020:\par 1. Normal LV size, systolic and diastolic function. EF 55-60%\par 2. Normal RV/LA/RA \par 3. Mild MR\par \par RENAL DUPLEX 1/2020: No evidence stenosis bilaterally \par \par REVA/PVR 2/2020:\par 1. R 0.97 L 1.03\par 2. Normal PVR\par \par PHARM NUCLEAR STRESS TEST 5/2019:\par 1. Negative for ischemia, EF 72%\par 2. BP hypertensive at baseline with normal response\par \par ECHO 5/2019:\par 1. Mildly increased LV wall thickness\par 2. Normal LV function, EF 65-70%\par 3. Mildly increased RV thickness\par 4. Trivial pericardial effusion\par \par CAROTID DUPLEX 5/2019\par 1. No hemodynamically significant stenosis\par 2. antegrade flow in vertebral arteries

## 2022-11-11 NOTE — DISCUSSION/SUMMARY
[FreeTextEntry1] : Patient is a 63yo F with HTN, minimal carotid disease, MR, GERD, collagenous colitis, RVOT PVC's here for cardiac follow up. \par - Secondary work up of HTN with equivocal findings. May just have low renin HTN and very salt sensitive, done in 2020 . Component of anxiety too . No pheo on MRI 10/2022\par -Pulmonary suspects dyspnea from chronotropic incompetence, doubts COPD. Does have untreated AARON\par -Echo and pharm nuclear stress test unremarkable spring 2022\par \par -Component anxiety causing labile BP, will try adding additional bystolic 2.5mg in mornings. ALso intermittent back pain/sciatica leading to increased BP. STates deep breathing helps BP\par -Palps maybe anxiety as well, symptoms don’t suggest significant dysrhtymia. Dyspnea ? etiology, follow up pulmonary and needs to be more active with regular exercise. \par \par 1. Continue current antihypertensives, blood pressure is well controlled \par 2. Continue ASA\par 3. Continue zetia , has refused statins and tried with side effects of cramps. \par 4. Recommend aggressive diet and lifestyle modifications , very low salt diet (2gm or less) \par 5. Counselled for more than 3 minutes on smoking cessation \par 6. Getting carotid evaluated with neurology\par 7. CV stable at this time, needs to be more active. Advised walking at least 30 minutes daily\par 8. Follow up 6 months [EKG obtained to assist in diagnosis and management of assessed problem(s)] : EKG obtained to assist in diagnosis and management of assessed problem(s)

## 2022-11-11 NOTE — PHYSICAL EXAM
[General Appearance - Well Developed] : well developed [General Appearance - Well Nourished] : well nourished [Normal Conjunctiva] : the conjunctiva exhibited no abnormalities [Normal Oropharynx] : normal oropharynx [Normal Jugular Venous V Waves Present] : normal jugular venous V waves present [] : no respiratory distress [Respiration, Rhythm And Depth] : normal respiratory rhythm and effort [Auscultation Breath Sounds / Voice Sounds] : lungs were clear to auscultation bilaterally [Heart Rate And Rhythm] : heart rate and rhythm were normal [Heart Sounds] : normal S1 and S2 [Murmurs] : no murmurs present [Bowel Sounds] : normal bowel sounds [Abdomen Tenderness] : non-tender [Abdomen Soft] : soft [Abnormal Walk] : normal gait [Nail Clubbing] : no clubbing of the fingernails [Cyanosis, Localized] : no localized cyanosis [Skin Color & Pigmentation] : normal skin color and pigmentation [Skin Turgor] : normal skin turgor [Oriented To Time, Place, And Person] : oriented to person, place, and time [Affect] : the affect was normal [FreeTextEntry1] : no edema, DP 2+ , reduced capillary refill > 3 seconds

## 2022-11-22 ENCOUNTER — RX RENEWAL (OUTPATIENT)
Age: 62
End: 2022-11-22

## 2022-11-23 ENCOUNTER — APPOINTMENT (OUTPATIENT)
Dept: ENDOCRINOLOGY | Facility: CLINIC | Age: 62
End: 2022-11-23

## 2022-12-08 ENCOUNTER — APPOINTMENT (OUTPATIENT)
Dept: ENDOCRINOLOGY | Facility: CLINIC | Age: 62
End: 2022-12-08

## 2022-12-08 DIAGNOSIS — R73.9 HYPERGLYCEMIA, UNSPECIFIED: ICD-10-CM

## 2022-12-08 PROCEDURE — 99214 OFFICE O/P EST MOD 30 MIN: CPT | Mod: 95

## 2022-12-08 RX ORDER — BUDESONIDE 3 MG/1
3 CAPSULE, COATED PELLETS ORAL
Qty: 270 | Refills: 1 | Status: DISCONTINUED | COMMUNITY
Start: 1900-01-01 | End: 2022-12-08

## 2022-12-08 NOTE — ASSESSMENT
[FreeTextEntry1] : Hyperthyroidism secondary to Graves disease in patient who adjusts treatments on her own and she possibly Recent CVA (she signed AMA) . She stops her meds on and off.  Curses at  . \par Recovering from recent URI with some fatigue and SOB.\par \par HyperT : no MMI for now. She stopped it on her own again. \par check tfts today \par \par NTMNG : declines compression sx . b/l subcentimeter nodules. Necks neck US she never goes. \par \par HTN : Continue current management.\par I advised low fat/low cholesterol diet, low salt diet\par \par HLD : had arthralgia with Crestor, start pravastatin 20 mg and recheck lipids in 4 mos.  \par low fat/low cholesterol diet \par \par all lab results reviewed and discussed with patient with extensive discussion. All questions answered\par Laboratory tests ordered today\par Diagnosis and prognosis discussed\par Continue other current medications \par \par Verbal consent obtained from patient for telemedicine .\par Discussed with patient given unable to provide physical exam, evaluation done on symptoms only. \par All lab results reviewed and discussed with patient. All questions answered\par This was a telehealth service rendered via interactive audio and video telecommunication\par  system.\par \par \par \par \par

## 2022-12-08 NOTE — HISTORY OF PRESENT ILLNESS
[Home] : at home, [unfilled] , at the time of the visit. [Medical Office: ([facility])___] : at the medical office located in  [Verbal consent obtained from patient] : the patient, [unfilled] [FreeTextEntry1] : follow up for  Graves disease. \par she has colitis and IBS. \par NTMNG s/p FNA 2014. \par she was seen in ER for possible CVA and she signed AMA \par \par thyroid uptake/scan shows uptake 65 %

## 2022-12-08 NOTE — REVIEW OF SYSTEMS
[Palpitations] : palpitations [Shortness Of Breath] : shortness of breath [Cough] : cough [SOB on Exertion] : shortness of breath on exertion [Diarrhea] : diarrhea [Negative] : Endocrine

## 2022-12-16 ENCOUNTER — APPOINTMENT (OUTPATIENT)
Dept: ULTRASOUND IMAGING | Facility: CLINIC | Age: 62
End: 2022-12-16

## 2022-12-16 ENCOUNTER — OUTPATIENT (OUTPATIENT)
Dept: OUTPATIENT SERVICES | Facility: HOSPITAL | Age: 62
LOS: 1 days | End: 2022-12-16

## 2022-12-16 DIAGNOSIS — E05.00 THYROTOXICOSIS WITH DIFFUSE GOITER WITHOUT THYROTOXIC CRISIS OR STORM: ICD-10-CM

## 2022-12-16 PROCEDURE — 76536 US EXAM OF HEAD AND NECK: CPT | Mod: 26

## 2022-12-22 ENCOUNTER — APPOINTMENT (OUTPATIENT)
Dept: FAMILY MEDICINE | Facility: CLINIC | Age: 62
End: 2022-12-22

## 2022-12-22 ENCOUNTER — RX RENEWAL (OUTPATIENT)
Age: 62
End: 2022-12-22

## 2022-12-22 VITALS
HEART RATE: 67 BPM | BODY MASS INDEX: 24.83 KG/M2 | HEIGHT: 65 IN | SYSTOLIC BLOOD PRESSURE: 131 MMHG | DIASTOLIC BLOOD PRESSURE: 82 MMHG | OXYGEN SATURATION: 97 % | WEIGHT: 149 LBS | TEMPERATURE: 97.1 F

## 2022-12-22 DIAGNOSIS — J02.9 ACUTE PHARYNGITIS, UNSPECIFIED: ICD-10-CM

## 2022-12-22 PROCEDURE — 99213 OFFICE O/P EST LOW 20 MIN: CPT | Mod: 25

## 2022-12-22 PROCEDURE — 87880 STREP A ASSAY W/OPTIC: CPT | Mod: QW

## 2022-12-22 NOTE — REVIEW OF SYSTEMS
[Sore Throat] : sore throat [Cough] : cough [Fever] : no fever [Chills] : no chills [Fatigue] : no fatigue [Earache] : no earache [Nasal Discharge] : no nasal discharge [Chest Pain] : no chest pain [Shortness Of Breath] : no shortness of breath

## 2022-12-22 NOTE — ASSESSMENT
[FreeTextEntry1] : pharyngitis- Will obtain nasal PCR swab for influenza/COVID/RSV and throat swab to evaluate for strep throat.  Rapid strep test negative, will send for confirmatory culture. Advised supportive management including fluid hydration, nasal saline irrigation, honey in warm water, rest, self-proning, acetaminophen for fever and/or pain control. Return/ED precautions given.\par

## 2022-12-22 NOTE — HISTORY OF PRESENT ILLNESS
[Cough] : cough [Sore Throat] : sore throat [FreeTextEntry1] : for a month [FreeTextEntry8] : Sultana presents for acute visit with chief complaint of sore throat.  She states that around Thanksgiving a month ago, she she had rhinovirus.  She was never formally tested but her grandchildren were positive for rhinovirus.  Since that time, she reports a lingering sore throat.  She had some leftover prednisone so she has been taking that because she was also having a persistent cough.  The cough has now subsided.  She has not had any fever or chills, denies any nasal congestion or rhinorrhea.\par

## 2022-12-23 LAB
INFLUENZA A RESULT: NOT DETECTED
INFLUENZA B RESULT: NOT DETECTED
RESP SYN VIRUS RESULT: NOT DETECTED
S PYO DNA THROAT QL NAA+PROBE: NOT DETECTED
SARS-COV-2 RESULT: NOT DETECTED

## 2023-02-28 ENCOUNTER — NON-APPOINTMENT (OUTPATIENT)
Age: 63
End: 2023-02-28

## 2023-03-24 ENCOUNTER — APPOINTMENT (OUTPATIENT)
Dept: CARDIOLOGY | Facility: CLINIC | Age: 63
End: 2023-03-24
Payer: COMMERCIAL

## 2023-03-24 ENCOUNTER — NON-APPOINTMENT (OUTPATIENT)
Age: 63
End: 2023-03-24

## 2023-03-24 VITALS
RESPIRATION RATE: 15 BRPM | HEIGHT: 65 IN | HEART RATE: 71 BPM | WEIGHT: 151 LBS | DIASTOLIC BLOOD PRESSURE: 90 MMHG | OXYGEN SATURATION: 98 % | BODY MASS INDEX: 25.16 KG/M2 | SYSTOLIC BLOOD PRESSURE: 142 MMHG

## 2023-03-24 PROCEDURE — 99214 OFFICE O/P EST MOD 30 MIN: CPT | Mod: 25

## 2023-03-24 PROCEDURE — 93000 ELECTROCARDIOGRAM COMPLETE: CPT

## 2023-03-24 NOTE — DISCUSSION/SUMMARY
[FreeTextEntry1] : Patient is a 61yo F with HTN, minimal carotid disease, MR, GERD, collagenous colitis, RVOT PVC's here for cardiac follow up. \par \par - Secondary work up of HTN with equivocal findings. May just have low renin HTN and very salt sensitive, done in 2020 . Component of anxiety too . No pheo on MRI 10/2022\par \par -Pulmonary suspects dyspnea from chronotropic incompetence, doubts COPD. Does have untreated AARON\par \par -Echo and pharm nuclear stress test unremarkable spring 2022\par \par -Component anxiety causing labile BP, \par \par -Palps maybe anxiety as well, symptoms don’t suggest significant dysrhythmia. Dyspnea ? etiology, chronotropic incompetence seems unlikely follow up pulmonary and needs to be more active with regular exercise. \par \par 1. Trial of lower dose bystolic 2.5mg daily for HTN and palps. Low suspicion was causing any dyspnea event with mild bradycardia. \par 2. Continue ASA\par 3. has refused statins and tried with side effects of cramps. ? side effects of zetia now as well with muscle pains. Will start trial of Nexletol\par 4. Recommend aggressive diet and lifestyle modifications , very low salt diet (2gm or less) \par 5. Counselled for more than 3 minutes on smoking cessation \par 6. CV stable at this time, needs to be more active. Advised walking at least 30 minutes daily\par 7. Follow up 3 months, recheck lipids then  [EKG obtained to assist in diagnosis and management of assessed problem(s)] : EKG obtained to assist in diagnosis and management of assessed problem(s)

## 2023-03-24 NOTE — HISTORY OF PRESENT ILLNESS
[FreeTextEntry1] : Patient is a 63yo F with HTN, mild carotid disease, MR, GERD, collagenous colitis, RVOT PVC's here for cardiac follow up. Has struggled with HTN and medication tolerance over years. \par \par Added Bystolic this past fall. More recently thinks its causing SOB and here to evaluate. Has now weaned off bystolic.  For a week felt better but then symptoms recurred. Somewhat limited as well by knee and back pain but tries to stay active.  Gets epidural injections which do help. Will also note some heart palps even at rest, more that it feels pounding and not racing. CAn walk to bathroom and at times will feel short of breath. Cleaning out attic and will get a bit sob, then does breathing exercises that help a bit but not as much as in past. Seems to be happening more the above symptoms. Trouble with sleeping too. \par \par \par \par Lives with  and dog. 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 Enjoys working in garden when weather better. \par \par \par \par ROS: Neuro and  negative

## 2023-03-31 RX ORDER — BEMPEDOIC ACID 180 MG/1
180 TABLET, FILM COATED ORAL
Qty: 30 | Refills: 5 | Status: DISCONTINUED | COMMUNITY
Start: 2023-03-24 | End: 2023-03-31

## 2023-04-11 ENCOUNTER — NON-APPOINTMENT (OUTPATIENT)
Age: 63
End: 2023-04-11

## 2023-04-11 NOTE — REVIEW OF SYSTEMS
[Cough] : cough [Negative] : Heme/Lymph Opioid Pregnancy And Lactation Text: These medications can lead to premature delivery and should be avoided during pregnancy. These medications are also present in breast milk in small amounts.

## 2023-04-21 ENCOUNTER — NON-APPOINTMENT (OUTPATIENT)
Age: 63
End: 2023-04-21

## 2023-04-24 ENCOUNTER — APPOINTMENT (OUTPATIENT)
Dept: CARDIOLOGY | Facility: CLINIC | Age: 63
End: 2023-04-24
Payer: COMMERCIAL

## 2023-04-24 ENCOUNTER — NON-APPOINTMENT (OUTPATIENT)
Age: 63
End: 2023-04-24

## 2023-04-24 PROCEDURE — 99211 OFF/OP EST MAY X REQ PHY/QHP: CPT

## 2023-05-11 ENCOUNTER — APPOINTMENT (OUTPATIENT)
Dept: CARDIOLOGY | Facility: CLINIC | Age: 63
End: 2023-05-11
Payer: COMMERCIAL

## 2023-05-11 ENCOUNTER — NON-APPOINTMENT (OUTPATIENT)
Age: 63
End: 2023-05-11

## 2023-05-11 VITALS
SYSTOLIC BLOOD PRESSURE: 130 MMHG | RESPIRATION RATE: 15 BRPM | WEIGHT: 146 LBS | DIASTOLIC BLOOD PRESSURE: 90 MMHG | HEIGHT: 65 IN | HEART RATE: 60 BPM | BODY MASS INDEX: 24.32 KG/M2 | OXYGEN SATURATION: 96 %

## 2023-05-11 PROCEDURE — 99214 OFFICE O/P EST MOD 30 MIN: CPT | Mod: 25

## 2023-05-11 PROCEDURE — 93000 ELECTROCARDIOGRAM COMPLETE: CPT

## 2023-05-11 RX ORDER — NEBIVOLOL 5 MG/1
5 TABLET ORAL
Qty: 30 | Refills: 2 | Status: DISCONTINUED | COMMUNITY
Start: 2021-08-09 | End: 2023-05-11

## 2023-05-11 RX ORDER — CLONIDINE HYDROCHLORIDE 0.3 MG/1
0.3 TABLET ORAL
Qty: 180 | Refills: 0 | Status: DISCONTINUED | COMMUNITY
Start: 2022-09-14 | End: 2023-05-11

## 2023-05-11 RX ORDER — PRAVASTATIN SODIUM 20 MG/1
20 TABLET ORAL
Qty: 1 | Refills: 1 | Status: DISCONTINUED | COMMUNITY
Start: 2022-12-08 | End: 2023-05-11

## 2023-05-11 NOTE — HISTORY OF PRESENT ILLNESS
[FreeTextEntry1] : Patient is a 61yo F with HTN, mild carotid disease, MR, GERD, collagenous colitis, RVOT PVC's here for cardiac follow up. Has struggled with HTN and medication tolerance over years. \par \par Added Bystolic this past fall 2022. More recently thinks its causing SOB , cut down to 2.5mg daily now. Was feeling fatigued/wiped out recently after taking clonidine. \par \par Limited by knee/back pain still. INtermittent mild dyspnea mostly unchanged. Patient denies PND/orthopnea/edema/palpitations/syncope/claudication. Struggling with sleep. Also GI cramping and planned for colonoscopy next week. Feels nauseated. \par \par Has not tolerated statins/zetia, advisde trial nexletol. Not covered and started on Praluent.\par \par Lives with  and dog. 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 Enjoys working in garden when weather better. \par \par \par \par ROS: Neuro and  negative

## 2023-05-11 NOTE — DISCUSSION/SUMMARY
[FreeTextEntry1] : Patient is a 63yo F with HTN, minimal carotid disease, MR, GERD, collagenous colitis, RVOT PVC's here for cardiac follow up. \par \par - Secondary work up of HTN with equivocal findings. May just have low renin HTN and very salt sensitive, done in 2020 . Component of anxiety too . No pheo on MRI 10/2022\par \par -Pulmonary suspects dyspnea from chronotropic incompetence, doubts COPD. Does have untreated AARON\par \par -Echo and pharm nuclear stress test unremarkable spring 2022\par \par -Component anxiety causing labile BP, \par \par -Palps maybe anxiety as well, symptoms don’t suggest significant dysrhythmia. Dyspnea ? etiology, chronotropic incompetence seems unlikely follow up pulmonary and needs to be more active with regular exercise. \par \par 1. Patient is at low cardiovascular risk for low risk surgery (colonoscopy)\par 2. Continue ASA, states praluent causing pain and unable to inject herself. Will check lipids and change to Repatha as its monthly\par 3. BP stable, no med changes. Can take additional bystolic prn if elevated and palps or clonidine if just spike in BP related to stressor\par 4. Recommend aggressive diet and lifestyle modifications , very low salt diet (2gm or less) \par 5. Counselled for more than 3 minutes on smoking cessation \par 6. CV stable at this time, needs to be more active. Advised walking at least 30 minutes daily\par 7. Follow up 4 months [EKG obtained to assist in diagnosis and management of assessed problem(s)] : EKG obtained to assist in diagnosis and management of assessed problem(s)

## 2023-05-26 ENCOUNTER — NON-APPOINTMENT (OUTPATIENT)
Age: 63
End: 2023-05-26

## 2023-06-01 ENCOUNTER — APPOINTMENT (OUTPATIENT)
Dept: GASTROENTEROLOGY | Facility: CLINIC | Age: 63
End: 2023-06-01

## 2023-06-05 ENCOUNTER — NON-APPOINTMENT (OUTPATIENT)
Age: 63
End: 2023-06-05

## 2023-06-05 ENCOUNTER — APPOINTMENT (OUTPATIENT)
Dept: CARDIOLOGY | Facility: CLINIC | Age: 63
End: 2023-06-05

## 2023-06-19 LAB
ALBUMIN SERPL ELPH-MCNC: 4.1 G/DL
ALP BLD-CCNC: 77 U/L
ALT SERPL-CCNC: 24 U/L
ANION GAP SERPL CALC-SCNC: 15 MMOL/L
AST SERPL-CCNC: 20 U/L
BILIRUB SERPL-MCNC: 0.4 MG/DL
BUN SERPL-MCNC: 13 MG/DL
CALCIUM SERPL-MCNC: 9.6 MG/DL
CHLORIDE SERPL-SCNC: 101 MMOL/L
CHOLEST SERPL-MCNC: 146 MG/DL
CO2 SERPL-SCNC: 24 MMOL/L
CREAT SERPL-MCNC: 0.93 MG/DL
EGFR: 69 ML/MIN/1.73M2
ESTIMATED AVERAGE GLUCOSE: 120 MG/DL
GLUCOSE SERPL-MCNC: 107 MG/DL
HBA1C MFR BLD HPLC: 5.8 %
HDLC SERPL-MCNC: 58 MG/DL
LDLC SERPL CALC-MCNC: 64 MG/DL
NONHDLC SERPL-MCNC: 88 MG/DL
POTASSIUM SERPL-SCNC: 4.4 MMOL/L
PROT SERPL-MCNC: 6.8 G/DL
SODIUM SERPL-SCNC: 140 MMOL/L
T3 SERPL-MCNC: 136 NG/DL
T4 FREE SERPL-MCNC: 1.5 NG/DL
TRIGL SERPL-MCNC: 123 MG/DL
TSH SERPL-ACNC: 0.12 UIU/ML

## 2023-06-20 ENCOUNTER — APPOINTMENT (OUTPATIENT)
Dept: CARDIOLOGY | Facility: CLINIC | Age: 63
End: 2023-06-20
Payer: COMMERCIAL

## 2023-06-20 VITALS
WEIGHT: 149 LBS | OXYGEN SATURATION: 98 % | DIASTOLIC BLOOD PRESSURE: 90 MMHG | SYSTOLIC BLOOD PRESSURE: 140 MMHG | BODY MASS INDEX: 24.83 KG/M2 | HEIGHT: 65 IN | RESPIRATION RATE: 16 BRPM | HEART RATE: 55 BPM

## 2023-06-20 PROCEDURE — 99214 OFFICE O/P EST MOD 30 MIN: CPT

## 2023-06-20 RX ORDER — EVOLOCUMAB 420 MG/3.5
420 KIT SUBCUTANEOUS
Qty: 1 | Refills: 5 | Status: DISCONTINUED | COMMUNITY
Start: 2023-05-11 | End: 2023-06-20

## 2023-06-20 NOTE — ASSESSMENT
[FreeTextEntry1] : ECG: SR, inferior and lateral ST depressions (unchanged)\par \par  HDL 58 LDL 64  (6/2023) \par  HDL 60   (10/2022) \par  HDL 55   (2/2020)\par CMP unremarkable 10/2022, A1C 5.9 (10/2022) \par \par Serum normetaneprhine 196 (upper limit  normal 146), metanephrine 28 (normal) \par Renin Activity Plasma < 0.167\par TSH 2.32\par Cortisol 10\par Calcium 9.6, PTH 48\par Aldosterone 3.8 \par \par ECHO 6/2020:\par 1. Normal LV size, systolic and diastolic function. EF 55-60%\par 2. Normal RV/LA/RA \par 3. Mild MR\par \par RENAL DUPLEX 1/2020: No evidence stenosis bilaterally \par \par REVA/PVR 2/2020:\par 1. R 0.97 L 1.03\par 2. Normal PVR\par \par PHARM NUCLEAR STRESS TEST 5/2019:\par 1. Negative for ischemia, EF 72%\par 2. BP hypertensive at baseline with normal response\par \par ECHO 5/2019:\par 1. Mildly increased LV wall thickness\par 2. Normal LV function, EF 65-70%\par 3. Mildly increased RV thickness\par 4. Trivial pericardial effusion\par \par CAROTID DUPLEX 5/2019\par 1. No hemodynamically significant stenosis\par 2. antegrade flow in vertebral arteries

## 2023-06-20 NOTE — DISCUSSION/SUMMARY
[FreeTextEntry1] : Patient is a 63yo F with HTN, minimal carotid disease, MR, GERD, collagenous colitis, RVOT PVC's here for cardiac follow up. \par \par - Secondary work up of HTN with equivocal findings. May just have low renin HTN and very salt sensitive, done in 2020 . Component of anxiety too . No pheo on MRI 10/2022\par \par -Pulmonary suspects dyspnea from chronotropic incompetence, doubts COPD. Does have untreated AARON\par \par -Echo and pharm nuclear stress test unremarkable spring 2022\par \par -Component anxiety causing labile BP, overall has been better controlled and on regimen she is tolerating better than others in past\par \par -Palps maybe anxiety as well, symptoms don’t suggest significant dysrhythmia. Dyspnea ? etiology, chronotropic incompetence seems unlikely follow up pulmonary and needs to be more active with regular exercise. \par \par 1. CV stable, continue aggressive risk factor modification \par 2. Continue ASA, states praluent difficult to inject herself, getting help by daughter. Has had significant improvement in lipids with it and will continue. \par 3. BP stable, no med changes. Can take additional bystolic prn if elevated and palps or clonidine if just spike in BP related to stressor. Home BP likely lower\par 4. Recommend aggressive diet and lifestyle modifications , very low salt diet (2gm or less) \par 5. Counselled for more than 3 minutes on smoking cessation \par 6. CV stable at this time, needs to be more active. Advised walking at least 30 minutes daily\par 7. Follow up 6 months \par 8. Still tired/fatigued, not tolerating CPAP which would help

## 2023-06-20 NOTE — HISTORY OF PRESENT ILLNESS
[FreeTextEntry1] : Patient is a 63yo F with HTN, mild carotid disease, MR, GERD, collagenous colitis, RVOT PVC's here for cardiac follow up. Has struggled with HTN and medication tolerance over years. \par \par Added Bystolic this past fall 2022. More recently thinks its causing SOB , cut down to 2.5mg daily now. Was feeling fatigued/wiped out recently after taking clonidine. \par \par Limited by knee/back pain still. INtermittent mild dyspnea mostly unchanged. Patient denies PND/orthopnea/edema/palpitations/syncope/claudication. Struggling with sleep. Also GI cramping and planned for colonoscopy next week. Feels nauseated. \par \par Has not tolerated statins/zetia, advisde trial nexletol. Not covered and started on Praluent but struggles with injection and pain at site. \par \par Lives with  and dog. 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 Enjoys working in garden when weather better. \par \par \par \par ROS: Neuro and  negative

## 2023-07-27 ENCOUNTER — OFFICE (OUTPATIENT)
Dept: URBAN - METROPOLITAN AREA CLINIC 115 | Facility: CLINIC | Age: 63
Setting detail: OPHTHALMOLOGY
End: 2023-07-27
Payer: COMMERCIAL

## 2023-07-27 DIAGNOSIS — H40.013: ICD-10-CM

## 2023-07-27 DIAGNOSIS — H04.123: ICD-10-CM

## 2023-07-27 PROCEDURE — 92250 FUNDUS PHOTOGRAPHY W/I&R: CPT | Performed by: OPHTHALMOLOGY

## 2023-07-27 PROCEDURE — 92012 INTRM OPH EXAM EST PATIENT: CPT | Performed by: OPHTHALMOLOGY

## 2023-07-27 ASSESSMENT — AXIALLENGTH_DERIVED
OD_AL: 23.8167
OS_AL: 23.283

## 2023-07-27 ASSESSMENT — TONOMETRY
OS_IOP_MMHG: 14
OD_IOP_MMHG: 10
OS_IOP_MMHG: 10
OD_IOP_MMHG: 12

## 2023-07-27 ASSESSMENT — PACHYMETRY
OS_CT_CORRECTION: -3
OD_CT_UM: 575
OD_CT_CORRECTION: -2
OS_CT_UM: 580

## 2023-07-27 ASSESSMENT — KERATOMETRY
OS_AXISANGLE_DEGREES: 90
OD_K2POWER_DIOPTERS: 45.25
OD_AXISANGLE_DEGREES: 76
OD_K1POWER_DIOPTERS: 44.25
OS_K2POWER_DIOPTERS: 45.00
OS_K1POWER_DIOPTERS: 44.50

## 2023-07-27 ASSESSMENT — TEAR BREAK UP TIME (TBUT)
OD_TBUT: 2+
OS_TBUT: 2+

## 2023-07-27 ASSESSMENT — REFRACTION_AUTOREFRACTION
OS_CYLINDER: -0.75
OD_AXIS: 177
OD_CYLINDER: -0.50
OD_SPHERE: -1.50
OS_AXIS: 013
OS_SPHERE: 0.00

## 2023-07-27 ASSESSMENT — SPHEQUIV_DERIVED
OD_SPHEQUIV: -1.75
OS_SPHEQUIV: -0.375

## 2023-07-27 ASSESSMENT — VISUAL ACUITY
OD_BCVA: 20/30-
OS_BCVA: 20/250

## 2023-07-27 ASSESSMENT — CONFRONTATIONAL VISUAL FIELD TEST (CVF)
OD_FINDINGS: FULL
OS_FINDINGS: FULL

## 2023-07-28 ENCOUNTER — APPOINTMENT (OUTPATIENT)
Dept: ENDOCRINOLOGY | Facility: CLINIC | Age: 63
End: 2023-07-28
Payer: COMMERCIAL

## 2023-07-28 VITALS — HEIGHT: 65 IN | BODY MASS INDEX: 24.99 KG/M2 | WEIGHT: 150 LBS

## 2023-07-28 VITALS — SYSTOLIC BLOOD PRESSURE: 122 MMHG | DIASTOLIC BLOOD PRESSURE: 72 MMHG

## 2023-07-28 VITALS — HEART RATE: 75 BPM | OXYGEN SATURATION: 97 %

## 2023-07-28 LAB
ALBUMIN SERPL ELPH-MCNC: 4.1 G/DL
ALP BLD-CCNC: 78 U/L
ALT SERPL-CCNC: 16 U/L
ANION GAP SERPL CALC-SCNC: 14 MMOL/L
AST SERPL-CCNC: 15 U/L
BILIRUB SERPL-MCNC: 0.2 MG/DL
BUN SERPL-MCNC: 12 MG/DL
CALCIUM SERPL-MCNC: 9.7 MG/DL
CHLORIDE SERPL-SCNC: 105 MMOL/L
CHOLEST SERPL-MCNC: 222 MG/DL
CO2 SERPL-SCNC: 24 MMOL/L
CREAT SERPL-MCNC: 1.04 MG/DL
EGFR: 60 ML/MIN/1.73M2
GLUCOSE SERPL-MCNC: 99 MG/DL
HDLC SERPL-MCNC: 48 MG/DL
LDLC SERPL CALC-MCNC: 142 MG/DL
NONHDLC SERPL-MCNC: 174 MG/DL
POTASSIUM SERPL-SCNC: 4.1 MMOL/L
PROT SERPL-MCNC: 6.8 G/DL
SODIUM SERPL-SCNC: 144 MMOL/L
T3 SERPL-MCNC: 127 NG/DL
T4 FREE SERPL-MCNC: 1.4 NG/DL
TRIGL SERPL-MCNC: 178 MG/DL
TSH SERPL-ACNC: 0.04 UIU/ML

## 2023-07-28 PROCEDURE — 99214 OFFICE O/P EST MOD 30 MIN: CPT

## 2023-07-28 RX ORDER — MUPIROCIN 20 MG/G
2 OINTMENT TOPICAL
Qty: 22 | Refills: 0 | Status: DISCONTINUED | COMMUNITY
Start: 2022-11-29 | End: 2023-07-28

## 2023-07-28 RX ORDER — FLUTICASONE PROPIONATE 50 UG/1
50 SPRAY, METERED NASAL DAILY
Qty: 1 | Refills: 1 | Status: DISCONTINUED | COMMUNITY
Start: 2022-12-22 | End: 2023-07-28

## 2023-07-28 RX ORDER — EZETIMIBE 10 MG/1
10 TABLET ORAL
Qty: 90 | Refills: 3 | Status: DISCONTINUED | COMMUNITY
Start: 2021-07-14 | End: 2023-07-28

## 2023-07-28 NOTE — HISTORY OF PRESENT ILLNESS
[FreeTextEntry1] : follow up for  Graves disease. \par she has colitis and IBS. \par NTMNG s/p FNA 2014. \par she was seen in ER for possible CVA and she signed AMA \par \par thyroid uptake/scan shows uptake 65 %

## 2023-07-28 NOTE — ASSESSMENT
[FreeTextEntry1] : Hyperthyroidism secondary to Graves disease in patient who adjusts treatments on her own and she possibly Recent CVA (she signed AMA) . She stops her meds on and off.  Curses at  . \par Tfts consistent with hyperT since she stopped her med again \par start MMI 2.5. mg qd \par complains of knee shaking. she has SOB \par check DXA \par \par NTMNG : declines compression sx . b/l subcentimeter nodules Dec 2022.  \par \par HTN :  bp at target on meds. Continue current management.\par I advised low fat/low cholesterol diet, low salt diet, and weight loss\par \par HLD : had arthralgias with statins. cont Praluent. She does not show up for shots every 2 weeks.  \par \par \par \par \par

## 2023-08-02 ENCOUNTER — RX RENEWAL (OUTPATIENT)
Age: 63
End: 2023-08-02

## 2023-09-04 ENCOUNTER — RX RENEWAL (OUTPATIENT)
Age: 63
End: 2023-09-04

## 2023-09-14 ENCOUNTER — RX RENEWAL (OUTPATIENT)
Age: 63
End: 2023-09-14

## 2023-09-29 ENCOUNTER — APPOINTMENT (OUTPATIENT)
Dept: CARDIOLOGY | Facility: CLINIC | Age: 63
End: 2023-09-29
Payer: MEDICARE

## 2023-09-29 ENCOUNTER — NON-APPOINTMENT (OUTPATIENT)
Age: 63
End: 2023-09-29

## 2023-09-29 VITALS
BODY MASS INDEX: 25.83 KG/M2 | SYSTOLIC BLOOD PRESSURE: 181 MMHG | HEART RATE: 57 BPM | RESPIRATION RATE: 16 BRPM | DIASTOLIC BLOOD PRESSURE: 93 MMHG | WEIGHT: 155 LBS | HEIGHT: 65 IN

## 2023-09-29 DIAGNOSIS — R00.2 PALPITATIONS: ICD-10-CM

## 2023-09-29 PROCEDURE — 93000 ELECTROCARDIOGRAM COMPLETE: CPT

## 2023-09-29 PROCEDURE — 99214 OFFICE O/P EST MOD 30 MIN: CPT

## 2023-09-29 RX ORDER — CLONIDINE HYDROCHLORIDE 0.2 MG/1
0.2 TABLET ORAL
Qty: 180 | Refills: 2 | Status: DISCONTINUED | COMMUNITY
Start: 2021-05-25 | End: 2023-09-29

## 2023-09-29 RX ORDER — VENLAFAXINE HYDROCHLORIDE 37.5 MG/1
37.5 CAPSULE, EXTENDED RELEASE ORAL DAILY
Qty: 90 | Refills: 3 | Status: DISCONTINUED | COMMUNITY
Start: 2020-12-04 | End: 2023-09-29

## 2023-09-29 RX ORDER — VENLAFAXINE HCL 75 MG
75 TABLET ORAL
Refills: 0 | Status: ACTIVE | COMMUNITY

## 2023-10-07 ENCOUNTER — OFFICE (OUTPATIENT)
Dept: URBAN - METROPOLITAN AREA CLINIC 104 | Facility: CLINIC | Age: 63
Setting detail: OPHTHALMOLOGY
End: 2023-10-07
Payer: MEDICARE

## 2023-10-07 DIAGNOSIS — H43.813: ICD-10-CM

## 2023-10-07 DIAGNOSIS — H40.013: ICD-10-CM

## 2023-10-07 DIAGNOSIS — H04.123: ICD-10-CM

## 2023-10-07 DIAGNOSIS — H26.492: ICD-10-CM

## 2023-10-07 DIAGNOSIS — H35.40: ICD-10-CM

## 2023-10-07 PROCEDURE — 99213 OFFICE O/P EST LOW 20 MIN: CPT | Performed by: OPHTHALMOLOGY

## 2023-10-07 ASSESSMENT — KERATOMETRY
OD_K1POWER_DIOPTERS: 44.41
OD_AXISANGLE_DEGREES: 063
OD_K2POWER_DIOPTERS: 44.76
OS_K1POWER_DIOPTERS: 44.41
OS_AXISANGLE_DEGREES: 087
OS_K2POWER_DIOPTERS: 44.88

## 2023-10-07 ASSESSMENT — PACHYMETRY
OS_CT_CORRECTION: -3
OD_CT_CORRECTION: -2
OD_CT_UM: 575
OS_CT_UM: 580

## 2023-10-07 ASSESSMENT — VISUAL ACUITY
OD_BCVA: 20/25-1
OS_BCVA: 20/300

## 2023-10-07 ASSESSMENT — CONFRONTATIONAL VISUAL FIELD TEST (CVF)
OS_FINDINGS: FULL
OD_FINDINGS: FULL

## 2023-10-07 ASSESSMENT — TEAR BREAK UP TIME (TBUT)
OS_TBUT: 2+
OD_TBUT: 2+

## 2023-10-07 ASSESSMENT — TONOMETRY
OD_IOP_MMHG: 12
OS_IOP_MMHG: 10

## 2023-10-10 ENCOUNTER — RX RENEWAL (OUTPATIENT)
Age: 63
End: 2023-10-10

## 2023-10-10 ENCOUNTER — APPOINTMENT (OUTPATIENT)
Dept: PULMONOLOGY | Facility: CLINIC | Age: 63
End: 2023-10-10
Payer: MEDICARE

## 2023-10-10 VITALS
RESPIRATION RATE: 16 BRPM | DIASTOLIC BLOOD PRESSURE: 78 MMHG | BODY MASS INDEX: 24.99 KG/M2 | WEIGHT: 150 LBS | SYSTOLIC BLOOD PRESSURE: 122 MMHG | HEIGHT: 65 IN | OXYGEN SATURATION: 94 % | HEART RATE: 107 BPM

## 2023-10-10 PROCEDURE — 99214 OFFICE O/P EST MOD 30 MIN: CPT

## 2023-10-10 RX ORDER — CLONIDINE HYDROCHLORIDE 0.2 MG/1
0.2 TABLET ORAL TWICE DAILY
Refills: 0 | Status: DISCONTINUED | COMMUNITY
End: 2023-10-10

## 2023-10-13 ENCOUNTER — LABORATORY RESULT (OUTPATIENT)
Age: 63
End: 2023-10-13

## 2023-10-13 ENCOUNTER — APPOINTMENT (OUTPATIENT)
Dept: PULMONOLOGY | Facility: CLINIC | Age: 63
End: 2023-10-13
Payer: MEDICARE

## 2023-10-13 VITALS — BODY MASS INDEX: 24.99 KG/M2 | WEIGHT: 150 LBS | HEIGHT: 65 IN

## 2023-10-13 PROCEDURE — 94729 DIFFUSING CAPACITY: CPT

## 2023-10-13 PROCEDURE — 85018 HEMOGLOBIN: CPT | Mod: QW

## 2023-10-13 PROCEDURE — 94010 BREATHING CAPACITY TEST: CPT

## 2023-10-13 PROCEDURE — 94727 GAS DIL/WSHOT DETER LNG VOL: CPT

## 2023-11-06 ENCOUNTER — RX RENEWAL (OUTPATIENT)
Age: 63
End: 2023-11-06

## 2023-12-08 ENCOUNTER — APPOINTMENT (OUTPATIENT)
Dept: CARDIOLOGY | Facility: CLINIC | Age: 63
End: 2023-12-08
Payer: MEDICARE

## 2023-12-08 ENCOUNTER — NON-APPOINTMENT (OUTPATIENT)
Age: 63
End: 2023-12-08

## 2023-12-08 VITALS
RESPIRATION RATE: 16 BRPM | HEIGHT: 65 IN | BODY MASS INDEX: 25.33 KG/M2 | OXYGEN SATURATION: 97 % | SYSTOLIC BLOOD PRESSURE: 170 MMHG | DIASTOLIC BLOOD PRESSURE: 100 MMHG | WEIGHT: 152 LBS | HEART RATE: 63 BPM

## 2023-12-08 PROCEDURE — 93000 ELECTROCARDIOGRAM COMPLETE: CPT

## 2023-12-08 PROCEDURE — 99214 OFFICE O/P EST MOD 30 MIN: CPT

## 2023-12-21 ENCOUNTER — APPOINTMENT (OUTPATIENT)
Dept: PULMONOLOGY | Facility: CLINIC | Age: 63
End: 2023-12-21

## 2024-01-02 ENCOUNTER — RX RENEWAL (OUTPATIENT)
Age: 64
End: 2024-01-02

## 2024-01-04 ENCOUNTER — APPOINTMENT (OUTPATIENT)
Dept: CT IMAGING | Facility: CLINIC | Age: 64
End: 2024-01-04

## 2024-01-04 ENCOUNTER — OUTPATIENT (OUTPATIENT)
Dept: OUTPATIENT SERVICES | Facility: HOSPITAL | Age: 64
LOS: 1 days | End: 2024-01-04

## 2024-01-04 DIAGNOSIS — Z00.8 ENCOUNTER FOR OTHER GENERAL EXAMINATION: ICD-10-CM

## 2024-02-01 ENCOUNTER — APPOINTMENT (OUTPATIENT)
Dept: ENDOCRINOLOGY | Facility: CLINIC | Age: 64
End: 2024-02-01
Payer: MEDICARE

## 2024-02-01 VITALS
WEIGHT: 154 LBS | SYSTOLIC BLOOD PRESSURE: 124 MMHG | BODY MASS INDEX: 25.66 KG/M2 | HEIGHT: 65 IN | DIASTOLIC BLOOD PRESSURE: 80 MMHG

## 2024-02-01 DIAGNOSIS — R73.03 PREDIABETES.: ICD-10-CM

## 2024-02-01 DIAGNOSIS — E05.00 THYROTOXICOSIS WITH DIFFUSE GOITER W/OUT THYROTOXIC CRISIS OR STORM: ICD-10-CM

## 2024-02-01 PROCEDURE — 99214 OFFICE O/P EST MOD 30 MIN: CPT

## 2024-02-01 NOTE — ASSESSMENT
[FreeTextEntry1] :  Hyperthyroidism secondary to Graves disease in patient who adjusts treatments on her own and she possibly Recent CVA (she signed AMA) . She stops her meds on and off.  Curses at  .  pt euthyroid clinically and biochemically. Continue  MMI 2.5. mg qd  check DXA  but she declines.   NTMNG : declines compression sx . B/l subcentimeter nodules Dec 2022 check thyroid US to evaluate architecture.    HTN :  bp at target on meds. Continue current management.  HLD : had arthralgias with statins. cont Praluent. She does not show up for shots every 2 weeks.

## 2024-02-01 NOTE — PHYSICAL EXAM
[Alert] : alert [Well Nourished] : well nourished [Obese] : obese [No Acute Distress] : no acute distress [Well Developed] : well developed [Normal Sclera/Conjunctiva] : normal sclera/conjunctiva [EOMI] : extra ocular movement intact [No Proptosis] : no proptosis [Normal Oropharynx] : the oropharynx was normal [Thyroid Not Enlarged] : the thyroid was not enlarged [No Thyroid Nodules] : no palpable thyroid nodules [No Respiratory Distress] : no respiratory distress [No Accessory Muscle Use] : no accessory muscle use [Clear to Auscultation] : lungs were clear to auscultation bilaterally [Normal S1, S2] : normal S1 and S2 [Normal Rate] : heart rate was normal [Regular Rhythm] : with a regular rhythm [No Edema] : no peripheral edema [Pedal Pulses Normal] : the pedal pulses are present [Normal Bowel Sounds] : normal bowel sounds [Not Tender] : non-tender [Not Distended] : not distended [Soft] : abdomen soft [Normal Gait] : normal gait [No Rash] : no rash [Oriented x3] : oriented to person, place, and time [Acanthosis Nigricans] : no acanthosis nigricans

## 2024-02-07 ENCOUNTER — OFFICE (OUTPATIENT)
Dept: URBAN - METROPOLITAN AREA CLINIC 104 | Facility: CLINIC | Age: 64
Setting detail: OPHTHALMOLOGY
End: 2024-02-07
Payer: MEDICARE

## 2024-02-07 DIAGNOSIS — H04.123: ICD-10-CM

## 2024-02-07 DIAGNOSIS — E05.00: ICD-10-CM

## 2024-02-07 PROCEDURE — 99213 OFFICE O/P EST LOW 20 MIN: CPT | Performed by: OPHTHALMOLOGY

## 2024-02-07 ASSESSMENT — CONFRONTATIONAL VISUAL FIELD TEST (CVF)
OS_FINDINGS: FULL
OD_FINDINGS: FULL

## 2024-02-07 ASSESSMENT — TEAR BREAK UP TIME (TBUT)
OD_TBUT: 2+
OS_TBUT: 2+

## 2024-02-08 ASSESSMENT — SPHEQUIV_DERIVED
OD_SPHEQUIV: -2.25
OD_SPHEQUIV: -1.75

## 2024-02-08 ASSESSMENT — LID EXAM ASSESSMENTS
OD_BLEPHARITIS: RLL RUL 1+
OS_BLEPHARITIS: LLL LUL 1+

## 2024-02-08 ASSESSMENT — REFRACTION_MANIFEST
OD_AXIS: 160
OD_CYLINDER: -1.00
OD_VA1: 20/100
OD_ADD: +2.25
OU_VA: 20/25
OS_ADD: +2.25
OD_SPHERE: -1.75

## 2024-02-08 ASSESSMENT — REFRACTION_AUTOREFRACTION
OD_CYLINDER: -0.50
OD_AXIS: 161
OD_SPHERE: -1.50
OS_CYLINDER: SPH
OS_SPHERE: -0.75

## 2024-02-08 ASSESSMENT — KERATOMETRY
OS_K2POWER_DIOPTERS: 44.88
OS_K1POWER_DIOPTERS: 44.41
OD_AXISANGLE_DEGREES: 063
OD_K1POWER_DIOPTERS: 44.41
OD_K2POWER_DIOPTERS: 44.76
OS_AXISANGLE_DEGREES: 087

## 2024-02-08 ASSESSMENT — AXIALLENGTH_DERIVED
OD_AL: 23.8786
OD_AL: 24.08

## 2024-02-18 ENCOUNTER — NON-APPOINTMENT (OUTPATIENT)
Age: 64
End: 2024-02-18

## 2024-02-19 ENCOUNTER — NON-APPOINTMENT (OUTPATIENT)
Age: 64
End: 2024-02-19

## 2024-02-20 ENCOUNTER — NON-APPOINTMENT (OUTPATIENT)
Age: 64
End: 2024-02-20

## 2024-02-23 ENCOUNTER — RX RENEWAL (OUTPATIENT)
Age: 64
End: 2024-02-23

## 2024-03-11 ENCOUNTER — RX RENEWAL (OUTPATIENT)
Age: 64
End: 2024-03-11

## 2024-03-12 ENCOUNTER — APPOINTMENT (OUTPATIENT)
Dept: CARDIOLOGY | Facility: CLINIC | Age: 64
End: 2024-03-12
Payer: MEDICARE

## 2024-03-12 ENCOUNTER — NON-APPOINTMENT (OUTPATIENT)
Age: 64
End: 2024-03-12

## 2024-03-12 VITALS
WEIGHT: 154 LBS | BODY MASS INDEX: 25.66 KG/M2 | DIASTOLIC BLOOD PRESSURE: 100 MMHG | HEIGHT: 65 IN | HEART RATE: 60 BPM | SYSTOLIC BLOOD PRESSURE: 158 MMHG | RESPIRATION RATE: 16 BRPM | OXYGEN SATURATION: 97 %

## 2024-03-12 DIAGNOSIS — G47.33 OBSTRUCTIVE SLEEP APNEA (ADULT) (PEDIATRIC): ICD-10-CM

## 2024-03-12 LAB
ALBUMIN SERPL ELPH-MCNC: 4 G/DL
ALP BLD-CCNC: 69 U/L
ALT SERPL-CCNC: 29 U/L
ANION GAP SERPL CALC-SCNC: 11 MMOL/L
AST SERPL-CCNC: 29 U/L
BILIRUB SERPL-MCNC: 0.4 MG/DL
BUN SERPL-MCNC: 9 MG/DL
CALCIUM SERPL-MCNC: 9.2 MG/DL
CHLORIDE SERPL-SCNC: 102 MMOL/L
CHOLEST SERPL-MCNC: 248 MG/DL
CO2 SERPL-SCNC: 26 MMOL/L
CREAT SERPL-MCNC: 0.96 MG/DL
EGFR: 66 ML/MIN/1.73M2
GLUCOSE SERPL-MCNC: 100 MG/DL
HDLC SERPL-MCNC: 48 MG/DL
LDLC SERPL CALC-MCNC: 165 MG/DL
NONHDLC SERPL-MCNC: 200 MG/DL
POTASSIUM SERPL-SCNC: 4.1 MMOL/L
PROT SERPL-MCNC: 6.7 G/DL
SODIUM SERPL-SCNC: 139 MMOL/L
T3 SERPL-MCNC: 119 NG/DL
T4 FREE SERPL-MCNC: 1.1 NG/DL
TRIGL SERPL-MCNC: 189 MG/DL
TSH SERPL-ACNC: 1.5 UIU/ML

## 2024-03-12 PROCEDURE — 99214 OFFICE O/P EST MOD 30 MIN: CPT

## 2024-03-12 PROCEDURE — G2211 COMPLEX E/M VISIT ADD ON: CPT

## 2024-03-12 PROCEDURE — 93000 ELECTROCARDIOGRAM COMPLETE: CPT

## 2024-03-12 NOTE — ASSESSMENT
[FreeTextEntry1] : ECG: SR, diffuse NSST    HDL 48   (3/2024)  HDL 48   (7/2023)  HDL 58 LDL 64  (6/2023)  HDL 60   (10/2022)  HDL 55   (2/2020)  CMP unremarkable (3/2024) CMP unremarkable 10/2022, A1C 5.9 (10/2022)  Serum normetaneprhine 196 (upper limit normal 146), metanephrine 28 (normal) Renin Activity Plasma < 0.167 TSH 2.32 Cortisol 10 Calcium 9.6, PTH 48 Aldosterone 3.8  PHARM NUCLEAR STRESS 4/2022: 1. Negative for ischemia/infarct, EF 70% 2. BP hypertensive at baseline  ECHO 4/2022: 1. Normal LV size, systolic and diastolic function. EF 60-65% 2. Mild LAE 3. Trivial pericardial effusion  ECHO 6/2020: 1. Normal LV size, systolic and diastolic function. EF 55-60% 2. Normal RV/LA/RA 3. Mild MR  RENAL DUPLEX 1/2020: No evidence stenosis bilaterally  REVA/PVR 2/2020: 1. R 0.97 L 1.03 2. Normal PVR  PHARM NUCLEAR STRESS TEST 5/2019: 1. Negative for ischemia, EF 72% 2. BP hypertensive at baseline with normal response  ECHO 5/2019: 1. Mildly increased LV wall thickness 2. Normal LV function, EF 65-70% 3. Mildly increased RV thickness 4. Trivial pericardial effusion  CAROTID DUPLEX 5/2019 1. No hemodynamically significant stenosis 2. antegrade flow in vertebral arteries.

## 2024-03-12 NOTE — DISCUSSION/SUMMARY
[FreeTextEntry1] : Patient is a 64yo F with labile HTN, minimal carotid disease, MR, GERD, collagenous colitis, Graves disease, RVOT PVC's here for cardiac follow up.  - Secondary work up of HTN with equivocal findings in past. May just have low renin HTN and very salt sensitive, done in 2020 . Component of anxiety too . No pheo on MRI 10/2022  -Pulmonary suspects component of dyspnea from chronotropic incompetence, doubts COPD.Does have untreated AARON but only mild. CT chest 2022 unremarkable  -Pharm nuclear stress test unremarkable spring 2022. Echo with mild LAE and trivial pericardial effusion   -Component anxiety causing labile BP, overall had been better controlled and on regimen she is tolerating better than others in past. . Increased stress lately I think leading to symptoms  as well as increase in BP.  -Having balance issues for awhile now, states seen neuro and negative work up.   -For insurance reasons had been off praluent and just restarted   1. Clonidine 0.3mg BID, cant tolerate TID.  Continue nebivolol . Arrange 24HR ABPM. Also some stress with medicare/insurance recently ? contributing to BP. Given recurrent accelerated BP will arrange ischemic evaluation and more prominent ST changes in ECG. Echo and pharm nuclear stress  2. Continue ASA, states praluent difficult to inject herself, getting help by daughter. Has had significant improvement in lipids with it , lipids increased now off it but just restarted.  3. Now euthyroid, follows with Dr Vicente  4. Recommend aggressive diet and lifestyle modifications , very low salt diet (2gm or less)  5. Counselled for more than 3 minutes on smoking cessation  6. Follow up after testing  [EKG obtained to assist in diagnosis and management of assessed problem(s)] : EKG obtained to assist in diagnosis and management of assessed problem(s)

## 2024-03-12 NOTE — PHYSICAL EXAM
[General Appearance - Well Nourished] : well nourished [General Appearance - Well Developed] : well developed [Normal Conjunctiva] : the conjunctiva exhibited no abnormalities [Normal Jugular Venous V Waves Present] : normal jugular venous V waves present [Normal Oropharynx] : normal oropharynx [Respiration, Rhythm And Depth] : normal respiratory rhythm and effort [] : no respiratory distress [Auscultation Breath Sounds / Voice Sounds] : lungs were clear to auscultation bilaterally [Heart Rate And Rhythm] : heart rate and rhythm were normal [Heart Sounds] : normal S1 and S2 [Murmurs] : no murmurs present [Abdomen Soft] : soft [Bowel Sounds] : normal bowel sounds [Abnormal Walk] : normal gait [Abdomen Tenderness] : non-tender [Cyanosis, Localized] : no localized cyanosis [Nail Clubbing] : no clubbing of the fingernails [Skin Color & Pigmentation] : normal skin color and pigmentation [Skin Turgor] : normal skin turgor [Oriented To Time, Place, And Person] : oriented to person, place, and time [Affect] : the affect was normal [FreeTextEntry1] : no edema, DP 2+ , reduced capillary refill > 3 seconds

## 2024-03-12 NOTE — HISTORY OF PRESENT ILLNESS
[FreeTextEntry1] : Patient is a 64yo F with HTN, mild carotid disease, MR, GERD, collagenous colitis, RVOT PVC's here for cardiac follow up. Has struggled with HTN and medication tolerance over years.   Added Bystolic fall 2022. Then thought it was causing SOB , cut down to 2.5mg daily now.   Has not tolerated statins/zetia, advised trial nexletol. Not covered and started on Praluent but struggles with injection and pain at site. Doing better now with it however. Having hard time breathing, seems worse at night. Symptoms since October. Gets dyspnea even at rest. Feeling some flutters as well lately. Feeling tired during the day, falling asleep in afternoon at times. NO clear CP. Head feels full. No PND/orthopnea/syncope/edema.   Still noting some on/off dyspnea, not consistent. Goes slow up stairs due to knee pain. Struggling lately with balance now as well. On higher dose clonidine now as well, 0.3mg TID. Symptoms started prior to clonidine. NO chest pain . Mood better on higher dose effexor 75mg for past year. Not taking clonidine tid, only bid due to fatigue.    Lives with  and dog, also cat who recently had kittents. 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 Enjoys working in garden when weather better.    ROS: Neuro and  negative

## 2024-03-13 ENCOUNTER — RX RENEWAL (OUTPATIENT)
Age: 64
End: 2024-03-13

## 2024-03-14 NOTE — REASON FOR VISIT
hyperglycemia [Follow-Up] : a follow-up visit [Sleep Apnea] : sleep apnea [Shortness of Breath] : shortness of breath

## 2024-03-25 ENCOUNTER — APPOINTMENT (OUTPATIENT)
Dept: CARDIOLOGY | Facility: CLINIC | Age: 64
End: 2024-03-25
Payer: MEDICARE

## 2024-03-25 PROCEDURE — A9500: CPT

## 2024-03-25 PROCEDURE — 93015 CV STRESS TEST SUPVJ I&R: CPT

## 2024-03-25 PROCEDURE — 78452 HT MUSCLE IMAGE SPECT MULT: CPT

## 2024-03-27 ENCOUNTER — APPOINTMENT (OUTPATIENT)
Dept: GASTROENTEROLOGY | Facility: CLINIC | Age: 64
End: 2024-03-27
Payer: MEDICARE

## 2024-03-27 VITALS
OXYGEN SATURATION: 98 % | SYSTOLIC BLOOD PRESSURE: 153 MMHG | DIASTOLIC BLOOD PRESSURE: 95 MMHG | RESPIRATION RATE: 14 BRPM | HEIGHT: 65 IN | WEIGHT: 155 LBS | HEART RATE: 61 BPM | BODY MASS INDEX: 25.83 KG/M2

## 2024-03-27 DIAGNOSIS — Z09 ENCOUNTER FOR FOLLOW-UP EXAMINATION AFTER COMPLETED TREATMENT FOR CONDITIONS OTHER THAN MALIGNANT NEOPLASM: ICD-10-CM

## 2024-03-27 PROCEDURE — 99214 OFFICE O/P EST MOD 30 MIN: CPT

## 2024-03-27 RX ORDER — CLONIDINE HYDROCHLORIDE 0.3 MG/1
0.3 TABLET ORAL
Refills: 0 | Status: DISCONTINUED | COMMUNITY
End: 2024-03-27

## 2024-03-27 RX ORDER — FLUTICASONE FUROATE AND VILANTEROL TRIFENATATE 100; 25 UG/1; UG/1
100-25 POWDER RESPIRATORY (INHALATION)
Qty: 1 | Refills: 2 | Status: DISCONTINUED | COMMUNITY
Start: 2022-05-26 | End: 2024-03-27

## 2024-03-27 RX ORDER — FAMOTIDINE 40 MG/1
40 TABLET, FILM COATED ORAL DAILY
Qty: 90 | Refills: 1 | Status: ACTIVE | COMMUNITY
Start: 2022-09-20 | End: 1900-01-01

## 2024-03-27 RX ORDER — DICYCLOMINE HYDROCHLORIDE 20 MG/1
20 TABLET ORAL 4 TIMES DAILY
Qty: 360 | Refills: 3 | Status: ACTIVE | COMMUNITY
Start: 2022-06-17 | End: 1900-01-01

## 2024-03-27 NOTE — HISTORY OF PRESENT ILLNESS
[FreeTextEntry1] : Patient with a history of IBS, collagenous colitis, GERD  In 2015 patient was diagnosed with collagenous colitis.  She was given Entocort.  She was taking that intermittently for "flares".  In 2018 repeat colonoscopy was performed.  Biopsies were negative for collagenous colitis.  Entocort tend to make patient constipated.  Patient does have IBS-D.  She gets "flares of IBS which last 2 days.  She will get cramping and diarrhea.  Symptoms are improved with dicyclomine as needed.  Usually needs 2 days of dicyclomine.  She states she sometimes takes Entocort-has an old prescription, but only takes Entocort for 2 days.  In fact patient tends to have constipation -she states she takes apple vinegar for her constipation which seems to control her symptoms.  She could not tolerate the taste of MiraLAX  Patient with a history of GERD for many years.  Controlled with pantoprazole twice daily.  She had an EGD in 2015 which showed gastritis.  In the past she complained of dysphagia but a modified barium swallow and esophagram were negative.

## 2024-03-27 NOTE — ASSESSMENT
[FreeTextEntry1] : A/P gerd- Today's instructions for acid reflux include avoid provocative foods. For example citrus alcohol coffee chocolate mints. Smaller meals, no eating 3 hours prior to bedtime and elevate head of the bed prior to sleep. Pantoprazole 40 mg twice daily was reordered  IBS- Patient with IBS-D-she will take dicyclomine 20 mg as needed. I explained to her that the Entocort is for collagenous colitis not for IBS.  No point taking Entocort for just 2 days.  I told her not to take that unless she speaks to us first  hx of collagenous colitis -last colonoscopy 2018, biopsies were negative for collagenous colitis  Patient with constipation- states Apple cider vinegar  F/U in 1 year

## 2024-03-27 NOTE — REASON FOR VISIT
[Follow-up] : a follow-up of an existing diagnosis [FreeTextEntry1] : IBS, GERD, history of collagenous colitis

## 2024-03-27 NOTE — PHYSICAL EXAM
[Alert] : alert [Normal Voice/Communication] : normal voice/communication [Healthy Appearing] : healthy appearing [No Acc Muscle Use] : no accessory muscle use [No Respiratory Distress] : no respiratory distress [Respiration, Rhythm And Depth] : normal respiratory rhythm and effort [Heart Rate And Rhythm] : heart rate was normal and rhythm regular [Auscultation Breath Sounds / Voice Sounds] : lungs were clear to auscultation bilaterally [Normal S1, S2] : normal S1 and S2 [Bowel Sounds] : normal bowel sounds [Abdomen Tenderness] : non-tender [No Masses] : no abdominal mass palpated [Abdomen Soft] : soft [Oriented To Time, Place, And Person] : oriented to person, place, and time

## 2024-03-28 ENCOUNTER — APPOINTMENT (OUTPATIENT)
Dept: CARDIOLOGY | Facility: CLINIC | Age: 64
End: 2024-03-28
Payer: MEDICARE

## 2024-03-28 PROCEDURE — 93306 TTE W/DOPPLER COMPLETE: CPT

## 2024-03-28 RX ORDER — PERFLUTREN 6.52 MG/ML
6.52 INJECTION, SUSPENSION INTRAVENOUS
Qty: 2 | Refills: 0 | Status: COMPLETED | OUTPATIENT
Start: 2024-03-28

## 2024-04-01 ENCOUNTER — APPOINTMENT (OUTPATIENT)
Dept: CARDIOLOGY | Facility: CLINIC | Age: 64
End: 2024-04-01
Payer: MEDICARE

## 2024-04-01 PROCEDURE — ZZZZZ: CPT

## 2024-04-01 RX ORDER — REGADENOSON 0.08 MG/ML
0.4 INJECTION, SOLUTION INTRAVENOUS
Qty: 1 | Refills: 0 | Status: COMPLETED | OUTPATIENT
Start: 2024-04-01

## 2024-04-01 RX ORDER — AMINOPHYLLINE 25 MG/ML
25 INJECTION, SOLUTION INTRAVENOUS
Qty: 0 | Refills: 0 | Status: COMPLETED | OUTPATIENT
Start: 2024-04-01

## 2024-04-01 RX ADMIN — REGADENOSON 5 MG/5ML: 0.08 INJECTION INTRAVENOUS at 00:00

## 2024-04-02 ENCOUNTER — APPOINTMENT (OUTPATIENT)
Dept: CARDIOLOGY | Facility: CLINIC | Age: 64
End: 2024-04-02

## 2024-04-03 ENCOUNTER — NON-APPOINTMENT (OUTPATIENT)
Age: 64
End: 2024-04-03

## 2024-04-03 PROCEDURE — 93784 AMBL BP MNTR W/SOFTWARE: CPT

## 2024-04-08 ENCOUNTER — APPOINTMENT (OUTPATIENT)
Dept: PULMONOLOGY | Facility: CLINIC | Age: 64
End: 2024-04-08
Payer: MEDICARE

## 2024-04-08 VITALS
DIASTOLIC BLOOD PRESSURE: 72 MMHG | HEART RATE: 72 BPM | SYSTOLIC BLOOD PRESSURE: 128 MMHG | OXYGEN SATURATION: 98 % | RESPIRATION RATE: 14 BRPM

## 2024-04-08 VITALS — BODY MASS INDEX: 25.19 KG/M2 | WEIGHT: 153 LBS | HEIGHT: 65.5 IN

## 2024-04-08 DIAGNOSIS — R06.02 SHORTNESS OF BREATH: ICD-10-CM

## 2024-04-08 PROCEDURE — G0296 VISIT TO DETERM LDCT ELIG: CPT

## 2024-04-08 PROCEDURE — 94729 DIFFUSING CAPACITY: CPT

## 2024-04-08 PROCEDURE — 94010 BREATHING CAPACITY TEST: CPT

## 2024-04-08 PROCEDURE — 94727 GAS DIL/WSHOT DETER LNG VOL: CPT

## 2024-04-08 PROCEDURE — 99406 BEHAV CHNG SMOKING 3-10 MIN: CPT

## 2024-04-08 PROCEDURE — 85018 HEMOGLOBIN: CPT | Mod: QW

## 2024-04-08 PROCEDURE — 99215 OFFICE O/P EST HI 40 MIN: CPT | Mod: 25

## 2024-04-08 RX ORDER — UMECLIDINIUM 62.5 UG/1
62.5 AEROSOL, POWDER ORAL DAILY
Qty: 1 | Refills: 5 | Status: ACTIVE | COMMUNITY
Start: 2024-04-08 | End: 1900-01-01

## 2024-04-08 NOTE — COUNSELING
[Cessation strategies including cessation program discussed] : Cessation strategies including cessation program discussed [Use of nicotine replacement therapies and other medications discussed] : Use of nicotine replacement therapies and other medications discussed [Encouraged to pick a quit date and identify support needed to quit] : Encouraged to pick a quit date and identify support needed to quit [Smoking Cessation Program Referral] : Smoking Cessation Program Referral  [FreeTextEntry2] : .  Smoker [FreeTextEntry1] : 3 [ - Annual Lung Cancer Screening/Share Decision Making Discussion] : Annual Lung Cancer Screening/Share Decision Making Discussion. (I have advised this patient to have a Low Dose CT (LDCT) scan of the lungs and have discussed the following with the patient in a shared decision making discussion:   Benefits of Detection and Early Treatment: There is adequate evidence that annual screening for lung cancer with LDCT in a population of high-risk persons can prevent a substantial number of lung cancer-related deaths. The magnitude of benefit depends on the individual patient's risk for lung cancer, as those who are at highest risk are most likely to benefit. Screening cannot prevent most lung cancer-related deaths, and does not replace smoking cessation. Harms of Detection and Early Intervention and Treatment: The harms associated with LDCT screening include false-negative and false-positive results, incidental findings, over diagnosis, and radiation exposure. False-positive LDCT results occur in a substantial proportion of screened persons; 95% of all positive results do not lead to a diagnosis of cancer. In a high-quality screening program, further imaging can resolve most false-positive results; however, some patients may require invasive procedures. Radiation harms, including cancer resulting from cumulative exposure to radiation, vary depending on the age at the start of screening; the number of scans received; and the person's exposure to other sources of radiation, particularly other medical imaging.)

## 2024-04-08 NOTE — RESULTS/DATA
[TextEntry] : 10/13/2023: Pulmonary function test-normal spirometry, normal distribution of lung volumes, mild diffusion deficit

## 2024-04-08 NOTE — DISCUSSION/SUMMARY
[FreeTextEntry1] : 63-year-old female seen today for the above.  Patient's pulmonary function test are consistent with mild degree of emphysema without any anatomic disease noted on her last CAT scan.  Dyspnea may be due to deconditioning, anxiety or mild reactive airways from her continued tobacco use.  A trial of long-acting muscarinic agent is being instituted which will minimize any cardiac effects.  Low-dose chest CT has been ordered

## 2024-04-08 NOTE — END OF VISIT
[FreeTextEntry3] : Care assumed. Former pt of Dr Galvan. Chart reviewed [Time Spent: ___ minutes] : I have spent [unfilled] minutes of time on the encounter.

## 2024-04-08 NOTE — CONSULT LETTER
[Dear  ___] : Dear  [unfilled], [Consult Letter:] : I had the pleasure of evaluating your patient, [unfilled]. [Please see my note below.] : Please see my note below. [Consult Closing:] : Thank you very much for allowing me to participate in the care of this patient.  If you have any questions, please do not hesitate to contact me. [Sincerely,] : Sincerely, [FreeTextEntry3] : Alex Holloway MD FCCP Pulmonary/Critical Care/Sleep Medicine Department of Internal Medicine  [facility]

## 2024-04-08 NOTE — HISTORY OF PRESENT ILLNESS
[Current] : current [>= 20 pack years] : >= 20 pack years [TextBox_4] : 63-year-old female with a history of hypertension, coronary artery disease last seen in this office in 2022 by Dr. Galvan.  Patient seen today for continued complaints of mild dyspnea.  She denies any significant degree of cough wheeze or sputum production.  She has noted improvement with as needed use of Symbicort.  She is active and has not had any significant decrease in exercise tolerance.  There is no history of leg edema, paroxysmal nocturnal dyspnea or orthopnea.  She is undergoing a cardiac workup post partial results are enclosed below.  Course is also complicated by mild strokes in the past without residual deficit.  There is no seasonal changes in her symptoms [TextBox_11] : 1 [TextBox_13] : 40 [Lung Cancer Screening] : Patient underwent lung cancer screening [1] : 1 [TextBox_12] : 10/17/2022 [TextBox_57] : Linear atelectasis in the right lower lobe, coronary artery calcifications

## 2024-04-09 ENCOUNTER — NON-APPOINTMENT (OUTPATIENT)
Age: 64
End: 2024-04-09

## 2024-04-09 VITALS — BODY MASS INDEX: 25.19 KG/M2 | HEIGHT: 65.5 IN | WEIGHT: 153 LBS

## 2024-04-09 NOTE — HISTORY OF PRESENT ILLNESS
[Current] : Current [TextBox_13] : Referred by Dr. Alex Holloway. Ms. FLOR is a 63 year old female with a history of HTN and high cholesterol. Reviewed and confirmed that the patient meets screening eligibility criteria: 63 years old Smoking Status: Current Smoker Number of pack(s) per day: 1 Number of years smoked: 42 Number of pack years smokin No symptoms of lung cancer, including new cough, change in cough, hemoptysis, and unintentional weight loss. No personal history of lung cancer. No lung cancer in a first degree relative. No history of lung disease or occupational exposures. [PacksperYear] : 42

## 2024-04-10 ENCOUNTER — RX RENEWAL (OUTPATIENT)
Age: 64
End: 2024-04-10

## 2024-04-15 ENCOUNTER — APPOINTMENT (OUTPATIENT)
Dept: CARDIOLOGY | Facility: CLINIC | Age: 64
End: 2024-04-15
Payer: MEDICARE

## 2024-04-15 VITALS
SYSTOLIC BLOOD PRESSURE: 160 MMHG | HEIGHT: 65.5 IN | RESPIRATION RATE: 15 BRPM | HEART RATE: 70 BPM | DIASTOLIC BLOOD PRESSURE: 84 MMHG | OXYGEN SATURATION: 97 %

## 2024-04-15 DIAGNOSIS — F17.210 NICOTINE DEPENDENCE, CIGARETTES, UNCOMPLICATED: ICD-10-CM

## 2024-04-15 DIAGNOSIS — I65.29 OCCLUSION AND STENOSIS OF UNSPECIFIED CAROTID ARTERY: ICD-10-CM

## 2024-04-15 DIAGNOSIS — R94.31 ABNORMAL ELECTROCARDIOGRAM [ECG] [EKG]: ICD-10-CM

## 2024-04-15 DIAGNOSIS — I49.3 VENTRICULAR PREMATURE DEPOLARIZATION: ICD-10-CM

## 2024-04-15 PROCEDURE — G2211 COMPLEX E/M VISIT ADD ON: CPT

## 2024-04-15 PROCEDURE — 99214 OFFICE O/P EST MOD 30 MIN: CPT

## 2024-04-15 RX ORDER — NEBIVOLOL 2.5 MG/1
2.5 TABLET ORAL
Qty: 90 | Refills: 2 | Status: DISCONTINUED | COMMUNITY
Start: 2023-03-24 | End: 2024-04-15

## 2024-04-15 RX ORDER — CLONIDINE HYDROCHLORIDE 0.3 MG/1
0.3 TABLET ORAL
Qty: 180 | Refills: 1 | Status: ACTIVE | COMMUNITY
Start: 2023-12-08

## 2024-04-15 NOTE — ASSESSMENT
[FreeTextEntry1] :    HDL 48   (3/2024)  HDL 48   (7/2023)  HDL 58 LDL 64  (6/2023)  HDL 60   (10/2022)  HDL 55   (2/2020)  CMP unremarkable (3/2024) CMP unremarkable 10/2022, A1C 5.9 (10/2022)  Serum normetaneprhine 196 (upper limit normal 146), metanephrine 28 (normal) Renin Activity Plasma < 0.167 TSH 2.32 Cortisol 10 Calcium 9.6, PTH 48 Aldosterone 3.8  PHARM NUCLEAR STRESS 3/2024: 1. Negative for ischemia/infarct, EF 60% 2. Resting /82, peak 130/76  PHARM NUCLEAR STRESS 4/2022: 1. Negative for ischemia/infarct, EF 70% 2. BP hypertensive at baseline  ECHO 3/2024: 1. TDS with borderline LVH, EF 60-65% 2. Grade I diastolic dysfunction  3. Normal RV/LA/RA   ECHO 4/2022: 1. Normal LV size, systolic and diastolic function. EF 60-65% 2. Mild LAE 3. Trivial pericardial effusion  ECHO 6/2020: 1. Normal LV size, systolic and diastolic function. EF 55-60% 2. Normal RV/LA/RA 3. Mild MR  RENAL DUPLEX 1/2020: No evidence stenosis bilaterally  REVA/PVR 2/2020: 1. R 0.97 L 1.03 2. Normal PVR  PHARM NUCLEAR STRESS TEST 5/2019: 1. Negative for ischemia, EF 72% 2. BP hypertensive at baseline with normal response  ECHO 5/2019: 1. Mildly increased LV wall thickness 2. Normal LV function, EF 65-70% 3. Mildly increased RV thickness 4. Trivial pericardial effusion  CAROTID DUPLEX 5/2019 1. No hemodynamically significant stenosis 2. antegrade flow in vertebral arteries.

## 2024-04-15 NOTE — DISCUSSION/SUMMARY
[FreeTextEntry1] : Patient is a 62yo F with labile HTN, minimal carotid disease, MR, GERD, collagenous colitis, Graves disease, RVOT PVC's here for cardiac follow up.  - Secondary work up of HTN with equivocal findings in past. May just have low renin HTN and very salt sensitive, done in 2020 . Component of anxiety too . No pheo on MRI 10/2022  -Seen by pulmonary recently, inhalers adjusted. Does have untreated AARON but only mild, CT chest ordered. TRial of Incruse Ellipta  but did not tolerate  -Pharm nuclear stress test unremarkable 4/2024, Echo with borderline LVH  -Component anxiety causing labile BP, overall had been better controlled and on regimen she is tolerating better than others in past. . Increased stress lately I think leading to symptoms  as well as increase in BP. tried 24 hour ABPM but multiple error readings and very high BP from her anxiety . Takes clonidine 0.3mg bid, was not tolerating TID due to drowsiness.   -Having balance issues for awhile now, states seen neuro and negative work up.   -For insurance reasons had been off praluent and just restarted after last OV, lipids high off it  1. Will try more selective beta blocker for HTN due to reactive airways, limited by multiple drug intolerances . Stop bystolic and trial of metoprolol tartrate 25mg bid 2. Continue ASA/Praluent 3. Now euthyroid, follows with Dr Vicente  4. Recommend aggressive diet and lifestyle modifications, very low salt diet (2gm or less)  5. Counselled for more than 3 minutes on smoking cessation, regular pulm follow up  6. Follow up 3-4 months

## 2024-04-15 NOTE — HISTORY OF PRESENT ILLNESS
[FreeTextEntry1] : Patient is a 62yo F with HTN, mild carotid disease, MR, GERD, collagenous colitis, RVOT PVC's here for cardiac follow up. Has struggled with HTN and medication tolerance over years.   Added Bystolic fall 2022. Then thought it was causing SOB , cut down to 2.5mg daily now.   Has not tolerated statins/zetia, advised trial nexletol. Not covered and started on Praluent but struggles with injection and pain at site. Doing better now with it however. Having hard time breathing, seems worse at night. Symptoms since October. Gets dyspnea even at rest. Feeling some flutters as well lately. Feeling tired during the day, falling asleep in afternoon at times. NO clear CP. Head feels full. No PND/orthopnea/syncope/edema.   Still noting some on/off dyspnea, not consistent. Goes slow up stairs due to knee pain. Struggling lately with balance now as well. Mood better on higher dose effexor 75mg for past year. Not taking clonidine tid, only bid due to fatigue. Patient underwent cardiac testing after last visit. No real change in symptoms at this time, continues to struggle with balance issue/dyspnea/dry mouth.    Lives with  and dog, also cat.  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 Enjoys working in garden when weather better.    ROS: Neuro and  negative

## 2024-04-23 RX ORDER — METHIMAZOLE 5 MG/1
5 TABLET ORAL
Qty: 45 | Refills: 1 | Status: ACTIVE | COMMUNITY
Start: 2022-04-28 | End: 1900-01-01

## 2024-04-24 RX ORDER — KIT FOR THE PREPARATION OF TECHNETIUM TC99M SESTAMIBI 1 MG/5ML
INJECTION, POWDER, LYOPHILIZED, FOR SOLUTION PARENTERAL
Refills: 0 | Status: COMPLETED | OUTPATIENT
Start: 2024-04-24

## 2024-04-24 RX ADMIN — KIT FOR THE PREPARATION OF TECHNETIUM TC99M SESTAMIBI 0: 1 INJECTION, POWDER, LYOPHILIZED, FOR SOLUTION PARENTERAL at 00:00

## 2024-04-25 ENCOUNTER — NON-APPOINTMENT (OUTPATIENT)
Age: 64
End: 2024-04-25

## 2024-05-03 ENCOUNTER — APPOINTMENT (OUTPATIENT)
Dept: CT IMAGING | Facility: CLINIC | Age: 64
End: 2024-05-03
Payer: MEDICARE

## 2024-05-03 ENCOUNTER — APPOINTMENT (OUTPATIENT)
Dept: ULTRASOUND IMAGING | Facility: CLINIC | Age: 64
End: 2024-05-03
Payer: MEDICARE

## 2024-05-03 ENCOUNTER — OUTPATIENT (OUTPATIENT)
Dept: OUTPATIENT SERVICES | Facility: HOSPITAL | Age: 64
LOS: 1 days | End: 2024-05-03

## 2024-05-03 DIAGNOSIS — E05.00 THYROTOXICOSIS WITH DIFFUSE GOITER WITHOUT THYROTOXIC CRISIS OR STORM: ICD-10-CM

## 2024-05-03 PROCEDURE — 76536 US EXAM OF HEAD AND NECK: CPT | Mod: 26

## 2024-05-03 PROCEDURE — 71271 CT THORAX LUNG CANCER SCR C-: CPT | Mod: 26

## 2024-05-10 ENCOUNTER — APPOINTMENT (OUTPATIENT)
Dept: ENDOCRINOLOGY | Facility: CLINIC | Age: 64
End: 2024-05-10
Payer: MEDICARE

## 2024-05-10 VITALS
HEART RATE: 79 BPM | DIASTOLIC BLOOD PRESSURE: 82 MMHG | OXYGEN SATURATION: 98 % | HEIGHT: 65 IN | SYSTOLIC BLOOD PRESSURE: 140 MMHG

## 2024-05-10 DIAGNOSIS — E05.20 THYROTOXICOSIS WITH TOXIC MULTINODULAR GOITER W/OUT THYROTOXIC CRISIS OR STORM: ICD-10-CM

## 2024-05-10 DIAGNOSIS — E04.2 NONTOXIC MULTINODULAR GOITER: ICD-10-CM

## 2024-05-10 DIAGNOSIS — E78.5 HYPERLIPIDEMIA, UNSPECIFIED: ICD-10-CM

## 2024-05-10 DIAGNOSIS — I10 ESSENTIAL (PRIMARY) HYPERTENSION: ICD-10-CM

## 2024-05-10 PROCEDURE — 99214 OFFICE O/P EST MOD 30 MIN: CPT

## 2024-05-10 PROCEDURE — G2211 COMPLEX E/M VISIT ADD ON: CPT

## 2024-05-10 RX ORDER — HYDROCHLOROTHIAZIDE 25 MG/1
25 TABLET ORAL
Refills: 0 | Status: ACTIVE | COMMUNITY

## 2024-05-10 RX ORDER — FLUTICASONE PROPIONATE 50 UG/1
50 SPRAY, METERED NASAL DAILY
Qty: 1 | Refills: 0 | Status: DISCONTINUED | COMMUNITY
Start: 2023-12-08 | End: 2024-05-10

## 2024-05-10 RX ORDER — METOPROLOL TARTRATE 50 MG/1
50 TABLET, FILM COATED ORAL TWICE DAILY
Qty: 180 | Refills: 1 | Status: DISCONTINUED | COMMUNITY
Start: 2024-04-15 | End: 2024-05-10

## 2024-05-10 RX ORDER — DEXAMETHASONE 1 MG/1
1 TABLET ORAL
Qty: 1 | Refills: 0 | Status: ACTIVE | COMMUNITY
Start: 2024-05-10 | End: 1900-01-01

## 2024-05-10 NOTE — ASSESSMENT
[FreeTextEntry1] : Hyperthyroidism secondary to Graves disease in patient who adjusts treatments on her own.  Recent CVA (she signed AMA) . She stops her meds on and off.  Curses at  .  Now she told me that cardiology cannot control her BP but she stopped this weekend BB on her own.  She has mites in eyelashes. She has bizarre explanations for her meds and symptoms.   HyperT pt euthyroid clinically and biochemically. Continue  MMI 2.5. mg qd she wants to stop it and she wants to have surgery for thyroid   declines DEXA scan.   NTMNG : declines compression sx . B/l subcentimeter nodules Dec 2022 US now pending. Will call pt with results.   HTN :  bp at target on meds. COnt clonidine , HCTZ (she is not sure of hCTZ)  check plasma fract metanephrines,  check aldost/renin  check cortisol /ACTH (BMI is 25, no striae) cont ASA   HLD : had arthralgias with statins. cont Praluent. She skips doses due to cost.

## 2024-05-10 NOTE — PHYSICAL EXAM
[Alert] : alert [Well Nourished] : well nourished [Obese] : obese [No Acute Distress] : no acute distress [Well Developed] : well developed [Normal Sclera/Conjunctiva] : normal sclera/conjunctiva [EOMI] : extra ocular movement intact [No Proptosis] : no proptosis [Normal Oropharynx] : the oropharynx was normal [Thyroid Not Enlarged] : the thyroid was not enlarged [No Thyroid Nodules] : no palpable thyroid nodules [No Respiratory Distress] : no respiratory distress [No Accessory Muscle Use] : no accessory muscle use [Clear to Auscultation] : lungs were clear to auscultation bilaterally [Normal S1, S2] : normal S1 and S2 [Normal Rate] : heart rate was normal [Regular Rhythm] : with a regular rhythm [No Edema] : no peripheral edema [Pedal Pulses Normal] : the pedal pulses are present [Normal Bowel Sounds] : normal bowel sounds [Not Tender] : non-tender [Not Distended] : not distended [Soft] : abdomen soft [Normal Gait] : normal gait [No Rash] : no rash [Acanthosis Nigricans] : no acanthosis nigricans [Oriented x3] : oriented to person, place, and time

## 2024-05-14 LAB
ACTH SER-ACNC: 1.5 PG/ML
ALDOSTERONE SERUM: 3.7 NG/DL
CORTIS SERPL-MCNC: 0.4 UG/DL

## 2024-05-21 ENCOUNTER — APPOINTMENT (OUTPATIENT)
Dept: PULMONOLOGY | Facility: CLINIC | Age: 64
End: 2024-05-21
Payer: MEDICARE

## 2024-05-21 VITALS
HEART RATE: 78 BPM | SYSTOLIC BLOOD PRESSURE: 138 MMHG | DIASTOLIC BLOOD PRESSURE: 92 MMHG | RESPIRATION RATE: 16 BRPM | OXYGEN SATURATION: 97 %

## 2024-05-21 VITALS — DIASTOLIC BLOOD PRESSURE: 92 MMHG | WEIGHT: 148 LBS | SYSTOLIC BLOOD PRESSURE: 146 MMHG | OXYGEN SATURATION: 97 %

## 2024-05-21 DIAGNOSIS — R06.09 OTHER FORMS OF DYSPNEA: ICD-10-CM

## 2024-05-21 DIAGNOSIS — F17.200 NICOTINE DEPENDENCE, UNSPECIFIED, UNCOMPLICATED: ICD-10-CM

## 2024-05-21 DIAGNOSIS — R05.9 COUGH, UNSPECIFIED: ICD-10-CM

## 2024-05-21 LAB
METANEPHRINE, PL: <25 PG/ML
NORMETANEPHRINE, PL: <25 PG/ML

## 2024-05-21 PROCEDURE — 99215 OFFICE O/P EST HI 40 MIN: CPT | Mod: 25

## 2024-05-21 PROCEDURE — 99406 BEHAV CHNG SMOKING 3-10 MIN: CPT

## 2024-05-21 PROCEDURE — G0296 VISIT TO DETERM LDCT ELIG: CPT

## 2024-05-21 RX ORDER — BUDESONIDE AND FORMOTEROL FUMARATE DIHYDRATE 160; 4.5 UG/1; UG/1
AEROSOL RESPIRATORY (INHALATION)
Refills: 0 | Status: DISCONTINUED | COMMUNITY

## 2024-05-21 NOTE — DISCUSSION/SUMMARY
[FreeTextEntry1] : Patient has no evidence of reactive airways disease or COPD on pulmonary function.  No evidence of emphysema on CAT scan.  CAT scan benign.

## 2024-05-21 NOTE — END OF VISIT
[FreeTextEntry3] : CT reviewed on PACS [Time Spent: ___ minutes] : I have spent [unfilled] minutes of time on the encounter.

## 2024-05-21 NOTE — COUNSELING
[Cessation strategies including cessation program discussed] : Cessation strategies including cessation program discussed [Use of nicotine replacement therapies and other medications discussed] : Use of nicotine replacement therapies and other medications discussed [Encouraged to pick a quit date and identify support needed to quit] : Encouraged to pick a quit date and identify support needed to quit [Smoking Cessation Program Referral] : Smoking Cessation Program Referral  [FreeTextEntry2] : Smoker [FreeTextEntry1] : 3 [ - Annual Lung Cancer Screening/Share Decision Making Discussion] : Annual Lung Cancer Screening/Share Decision Making Discussion. (I have advised this patient to have a Low Dose CT (LDCT) scan of the lungs and have discussed the following with the patient in a shared decision making discussion:   Benefits of Detection and Early Treatment: There is adequate evidence that annual screening for lung cancer with LDCT in a population of high-risk persons can prevent a substantial number of lung cancer-related deaths. The magnitude of benefit depends on the individual patient's risk for lung cancer, as those who are at highest risk are most likely to benefit. Screening cannot prevent most lung cancer-related deaths, and does not replace smoking cessation. Harms of Detection and Early Intervention and Treatment: The harms associated with LDCT screening include false-negative and false-positive results, incidental findings, over diagnosis, and radiation exposure. False-positive LDCT results occur in a substantial proportion of screened persons; 95% of all positive results do not lead to a diagnosis of cancer. In a high-quality screening program, further imaging can resolve most false-positive results; however, some patients may require invasive procedures. Radiation harms, including cancer resulting from cumulative exposure to radiation, vary depending on the age at the start of screening; the number of scans received; and the person's exposure to other sources of radiation, particularly other medical imaging.)

## 2024-05-21 NOTE — HISTORY OF PRESENT ILLNESS
[Current] : current [TextBox_4] : 63-year-old female with a history of hypertension, coronary artery disease seen today in follow-up for complaints of mild dyspnea.  Patient was felt mostly on the basis to have underlying deconditioning but despite normal lung function she was given a trial of Incruse on her last appointment.  In addition a low-dose chest CT was also added.  The principal differential diagnosis included that of deconditioning or mild reactive airways disease.  Did not use Incruse do to effects of concerns for thyroid stimulation. Pt randomly has complaints and focuses on thyroid function mostly.  No baseline dyspnea on exertion. [TextBox_11] : 1 [TextBox_13] : 40

## 2024-05-23 ENCOUNTER — RX RENEWAL (OUTPATIENT)
Age: 64
End: 2024-05-23

## 2024-05-31 ENCOUNTER — NON-APPOINTMENT (OUTPATIENT)
Age: 64
End: 2024-05-31

## 2024-05-31 LAB — RENIN ACTIVITY, PLASMA: 0.18 NG/ML/HR

## 2024-06-05 RX ORDER — ALIROCUMAB 75 MG/ML
75 INJECTION, SOLUTION SUBCUTANEOUS
Qty: 2 | Refills: 3 | Status: DISCONTINUED | COMMUNITY
Start: 2023-03-31 | End: 2024-06-05

## 2024-06-05 RX ORDER — EVOLOCUMAB 140 MG/ML
140 INJECTION, SOLUTION SUBCUTANEOUS
Qty: 6 | Refills: 1 | Status: ACTIVE | COMMUNITY
Start: 2024-06-05 | End: 1900-01-01

## 2024-06-07 ENCOUNTER — LABORATORY RESULT (OUTPATIENT)
Age: 64
End: 2024-06-07

## 2024-06-12 ENCOUNTER — RX RENEWAL (OUTPATIENT)
Age: 64
End: 2024-06-12

## 2024-06-12 RX ORDER — PANTOPRAZOLE 40 MG/1
40 TABLET, DELAYED RELEASE ORAL TWICE DAILY
Qty: 180 | Refills: 0 | Status: ACTIVE | COMMUNITY
Start: 2021-12-03 | End: 1900-01-01

## 2024-06-17 ENCOUNTER — NON-APPOINTMENT (OUTPATIENT)
Age: 64
End: 2024-06-17

## 2024-06-17 LAB
ACTH SER-ACNC: <1.5 PG/ML
ALDOSTERONE SERUM: <3 NG/DL

## 2024-07-12 RX ORDER — ALIROCUMAB 75 MG/ML
75 INJECTION, SOLUTION SUBCUTANEOUS
Qty: 6 | Refills: 3 | Status: ACTIVE | COMMUNITY
Start: 2024-07-12 | End: 1900-01-01

## 2024-07-18 ENCOUNTER — APPOINTMENT (OUTPATIENT)
Dept: CARDIOLOGY | Facility: CLINIC | Age: 64
End: 2024-07-18

## 2024-07-19 ENCOUNTER — RX RENEWAL (OUTPATIENT)
Age: 64
End: 2024-07-19

## 2024-08-05 ENCOUNTER — RX RENEWAL (OUTPATIENT)
Age: 64
End: 2024-08-05

## 2024-08-13 ENCOUNTER — RX RENEWAL (OUTPATIENT)
Age: 64
End: 2024-08-13

## 2024-08-14 ENCOUNTER — NON-APPOINTMENT (OUTPATIENT)
Age: 64
End: 2024-08-14

## 2024-08-15 ENCOUNTER — APPOINTMENT (OUTPATIENT)
Dept: ENDOCRINOLOGY | Facility: CLINIC | Age: 64
End: 2024-08-15
Payer: MEDICARE

## 2024-08-15 VITALS
OXYGEN SATURATION: 98 % | DIASTOLIC BLOOD PRESSURE: 84 MMHG | HEIGHT: 65 IN | WEIGHT: 149 LBS | HEART RATE: 84 BPM | SYSTOLIC BLOOD PRESSURE: 144 MMHG | BODY MASS INDEX: 24.83 KG/M2

## 2024-08-15 DIAGNOSIS — Z13.820 ENCOUNTER FOR SCREENING FOR OSTEOPOROSIS: ICD-10-CM

## 2024-08-15 DIAGNOSIS — E05.00 THYROTOXICOSIS WITH DIFFUSE GOITER W/OUT THYROTOXIC CRISIS OR STORM: ICD-10-CM

## 2024-08-15 DIAGNOSIS — E78.5 HYPERLIPIDEMIA, UNSPECIFIED: ICD-10-CM

## 2024-08-15 DIAGNOSIS — I10 ESSENTIAL (PRIMARY) HYPERTENSION: ICD-10-CM

## 2024-08-15 DIAGNOSIS — R73.9 HYPERGLYCEMIA, UNSPECIFIED: ICD-10-CM

## 2024-08-15 PROCEDURE — 99214 OFFICE O/P EST MOD 30 MIN: CPT

## 2024-08-15 RX ORDER — AMLODIPINE BESYLATE 10 MG/1
10 TABLET ORAL
Refills: 0 | Status: ACTIVE | COMMUNITY

## 2024-08-15 NOTE — ASSESSMENT
[FreeTextEntry1] :  Hyperthyroidism secondary to Graves disease in patient who adjusts treatments on her own.  Recent CVA (she signed AMA) . She stops her meds on and off.  Curses at front deskat times .  Stated cardiology cannot control her BP but she stopped BB on her own.  She has bizarre explanations for her meds and symptoms. Ophthalmo told her mites eyelash.  HyperT pt euthyroid clinically and biochemically. Continue  MMI 2.5 mg qd she wants to stop it and she wants to have surgery for thyroid   declines DEXA scan.   NTMNG : declines compressive  sx . B/l subcentimeter nodules  US  May 2024 : stable. Repeat in 12-18 mos.    HTN :  bp at target on meds. COnt clonidine and CCB  work up for secondary adrenal causes : 1 mg DST normal, aldost and renin don't support diagnosis of primary hyperaldost.Plasma metanephrines normal. cont ASA   HLD : had arthralgias with statins. cont Praluent. She skips doses due to cost.   High platelets : followup with PCP or hematology.   Prediabetes : discussed diet and exercise encouraged more exercise walking 30 min 3 x week

## 2024-09-16 ENCOUNTER — RX RENEWAL (OUTPATIENT)
Age: 64
End: 2024-09-16

## 2024-10-15 ENCOUNTER — OFFICE (OUTPATIENT)
Dept: URBAN - METROPOLITAN AREA CLINIC 63 | Facility: CLINIC | Age: 64
Setting detail: OPHTHALMOLOGY
End: 2024-10-15
Payer: MEDICARE

## 2024-10-15 DIAGNOSIS — H40.013: ICD-10-CM

## 2024-10-15 DIAGNOSIS — H26.492: ICD-10-CM

## 2024-10-15 DIAGNOSIS — H04.123: ICD-10-CM

## 2024-10-15 DIAGNOSIS — E05.00: ICD-10-CM

## 2024-10-15 DIAGNOSIS — H35.40: ICD-10-CM

## 2024-10-15 DIAGNOSIS — H02.89: ICD-10-CM

## 2024-10-15 DIAGNOSIS — H43.813: ICD-10-CM

## 2024-10-15 PROBLEM — Z96.1 PSEUDOPHAKIA: Status: ACTIVE | Noted: 2024-10-15

## 2024-10-15 PROCEDURE — 92133 CPTRZD OPH DX IMG PST SGM ON: CPT | Performed by: INTERNAL MEDICINE

## 2024-10-15 PROCEDURE — 76514 ECHO EXAM OF EYE THICKNESS: CPT | Performed by: INTERNAL MEDICINE

## 2024-10-15 PROCEDURE — 92014 COMPRE OPH EXAM EST PT 1/>: CPT | Performed by: INTERNAL MEDICINE

## 2024-10-15 PROCEDURE — 92083 EXTENDED VISUAL FIELD XM: CPT | Performed by: INTERNAL MEDICINE

## 2024-10-15 ASSESSMENT — CONFRONTATIONAL VISUAL FIELD TEST (CVF)
OS_FINDINGS: FULL
OD_FINDINGS: FULL

## 2024-10-15 ASSESSMENT — REFRACTION_AUTOREFRACTION
OD_AXIS: 088
OS_AXIS: 178
OD_SPHERE: -1.25
OS_CYLINDER: -0.75
OS_SPHERE: 0.00
OD_CYLINDER: -0.50

## 2024-10-15 ASSESSMENT — KERATOMETRY
OS_K1POWER_DIOPTERS: 44.50
OD_K1POWER_DIOPTERS: 44.50
OS_K2POWER_DIOPTERS: 45.25
OD_AXISANGLE_DEGREES: 086
OS_AXISANGLE_DEGREES: 088
OD_K2POWER_DIOPTERS: 44.75

## 2024-10-15 ASSESSMENT — TONOMETRY
OS_IOP_MMHG: 15
OS_IOP_MMHG: 16
OD_IOP_MMHG: 15
OD_IOP_MMHG: 14

## 2024-10-15 ASSESSMENT — VISUAL ACUITY
OD_BCVA: 20/40
OS_BCVA: 20/70-1

## 2024-10-15 ASSESSMENT — PACHYMETRY
OS_CT_CORRECTION: 0
OD_CT_UM: 552
OS_CT_UM: 548
OD_CT_CORRECTION: -1

## 2024-10-15 ASSESSMENT — TEAR BREAK UP TIME (TBUT)
OD_TBUT: 2+
OS_TBUT: 2+

## 2024-10-15 ASSESSMENT — LID EXAM ASSESSMENTS
OD_MEIBOMITIS: RUL 1+
OS_MEIBOMITIS: LUL 1+

## 2024-11-20 ENCOUNTER — RX RENEWAL (OUTPATIENT)
Age: 64
End: 2024-11-20

## 2024-12-03 ENCOUNTER — OFFICE (OUTPATIENT)
Dept: URBAN - METROPOLITAN AREA CLINIC 104 | Facility: CLINIC | Age: 64
Setting detail: OPHTHALMOLOGY
End: 2024-12-03
Payer: MEDICARE

## 2024-12-03 DIAGNOSIS — Z96.1: ICD-10-CM

## 2024-12-03 DIAGNOSIS — H40.013: ICD-10-CM

## 2024-12-03 PROCEDURE — 99213 OFFICE O/P EST LOW 20 MIN: CPT | Performed by: OPTOMETRIST

## 2024-12-03 ASSESSMENT — CONFRONTATIONAL VISUAL FIELD TEST (CVF)
OS_FINDINGS: FULL
OD_FINDINGS: FULL

## 2024-12-03 ASSESSMENT — REFRACTION_AUTOREFRACTION
OD_SPHERE: -1.50
OD_AXIS: 117
OS_AXIS: 31
OS_CYLINDER: -0.50
OD_CYLINDER: -0.50
OS_SPHERE: -1.00

## 2024-12-03 ASSESSMENT — REFRACTION_MANIFEST
OD_SPHERE: -2.00
OS_VA1: 20/25
OS_SPHERE: -1.00
OS_CYLINDER: SPHERE
OD_VA1: 20/20
OD_CYLINDER: SPHERE

## 2024-12-03 ASSESSMENT — VISUAL ACUITY
OS_BCVA: 20/100
OD_BCVA: 20/60

## 2024-12-03 ASSESSMENT — LID EXAM ASSESSMENTS
OD_MEIBOMITIS: RUL 1+
OS_MEIBOMITIS: LUL 1+

## 2024-12-03 ASSESSMENT — TEAR BREAK UP TIME (TBUT)
OS_TBUT: 2+
OD_TBUT: 2+

## 2024-12-03 ASSESSMENT — KERATOMETRY
OS_K1POWER_DIOPTERS: UNABLE
OD_K2POWER_DIOPTERS: 44.94
OD_K1POWER_DIOPTERS: 44.41
OD_AXISANGLE_DEGREES: 77

## 2024-12-10 ENCOUNTER — RX RENEWAL (OUTPATIENT)
Age: 64
End: 2024-12-10

## 2024-12-11 ENCOUNTER — LABORATORY RESULT (OUTPATIENT)
Age: 64
End: 2024-12-11

## 2024-12-16 ENCOUNTER — APPOINTMENT (OUTPATIENT)
Dept: RADIOLOGY | Facility: CLINIC | Age: 64
End: 2024-12-16

## 2025-01-27 ENCOUNTER — OFFICE (OUTPATIENT)
Dept: URBAN - METROPOLITAN AREA CLINIC 12 | Facility: CLINIC | Age: 65
Setting detail: OPHTHALMOLOGY
End: 2025-01-27
Payer: MEDICARE

## 2025-01-27 DIAGNOSIS — H51.11: ICD-10-CM

## 2025-01-27 DIAGNOSIS — Z96.1: ICD-10-CM

## 2025-01-27 DIAGNOSIS — H35.40: ICD-10-CM

## 2025-01-27 DIAGNOSIS — H40.013: ICD-10-CM

## 2025-01-27 DIAGNOSIS — H44.23: ICD-10-CM

## 2025-01-27 DIAGNOSIS — H26.492: ICD-10-CM

## 2025-01-27 DIAGNOSIS — E05.00: ICD-10-CM

## 2025-01-27 DIAGNOSIS — H02.89: ICD-10-CM

## 2025-01-27 DIAGNOSIS — H04.123: ICD-10-CM

## 2025-01-27 DIAGNOSIS — H43.813: ICD-10-CM

## 2025-01-27 PROCEDURE — 92014 COMPRE OPH EXAM EST PT 1/>: CPT | Performed by: OPHTHALMOLOGY

## 2025-01-27 ASSESSMENT — PACHYMETRY
OD_CT_CORRECTION: -1
OD_CT_UM: 552

## 2025-01-27 ASSESSMENT — TEAR BREAK UP TIME (TBUT)
OS_TBUT: 2+
OD_TBUT: 2+

## 2025-01-27 ASSESSMENT — CONFRONTATIONAL VISUAL FIELD TEST (CVF)
OD_FINDINGS: FULL
OS_FINDINGS: FULL

## 2025-01-27 ASSESSMENT — TONOMETRY
OD_IOP_MMHG: 13
OS_IOP_MMHG: 17

## 2025-01-27 ASSESSMENT — LID EXAM ASSESSMENTS
OD_MEIBOMITIS: RUL 1+
OS_MEIBOMITIS: LUL 1+

## 2025-01-28 ASSESSMENT — REFRACTION_AUTOREFRACTION
OS_SPHERE: -0.25
OD_AXIS: 135
OD_CYLINDER: -0.25
OS_AXIS: 175
OS_CYLINDER: -0.75
OD_SPHERE: -1.25

## 2025-01-28 ASSESSMENT — REFRACTION_MANIFEST
OD_SPHERE: -1.50
OS_SPHERE: -0.50
OD_VA1: 20/20
OS_VA1: 20/20
OS_SPHERE: -1.00
OS_AXIS: 175
OD_SPHERE: -2.00
OS_VA1: 20/25
OD_CYLINDER: SPHERE
OS_CYLINDER: -0.50
OD_CYLINDER: SPHERE
OD_VA1: 20/20
OS_CYLINDER: SPHERE

## 2025-01-28 ASSESSMENT — KERATOMETRY
OS_K1POWER_DIOPTERS: 44.75
OD_K2POWER_DIOPTERS: 44.50
OD_K1POWER_DIOPTERS: 44.50
OD_AXISANGLE_DEGREES: 090
OS_K2POWER_DIOPTERS: 45.00
OS_AXISANGLE_DEGREES: 136

## 2025-01-28 ASSESSMENT — VISUAL ACUITY
OD_BCVA: 20/30
OS_BCVA: 20/125

## 2025-02-07 ENCOUNTER — NON-APPOINTMENT (OUTPATIENT)
Age: 65
End: 2025-02-07

## 2025-02-07 ENCOUNTER — APPOINTMENT (OUTPATIENT)
Dept: ENDOCRINOLOGY | Facility: CLINIC | Age: 65
End: 2025-02-07
Payer: MEDICARE

## 2025-02-07 VITALS
SYSTOLIC BLOOD PRESSURE: 150 MMHG | HEIGHT: 65 IN | HEART RATE: 83 BPM | OXYGEN SATURATION: 99 % | DIASTOLIC BLOOD PRESSURE: 80 MMHG | BODY MASS INDEX: 22.82 KG/M2 | WEIGHT: 137 LBS

## 2025-02-07 DIAGNOSIS — Z09 ENCOUNTER FOR FOLLOW-UP EXAMINATION AFTER COMPLETED TREATMENT FOR CONDITIONS OTHER THAN MALIGNANT NEOPLASM: ICD-10-CM

## 2025-02-07 DIAGNOSIS — I10 ESSENTIAL (PRIMARY) HYPERTENSION: ICD-10-CM

## 2025-02-07 DIAGNOSIS — R73.03 PREDIABETES.: ICD-10-CM

## 2025-02-07 DIAGNOSIS — E04.2 NONTOXIC MULTINODULAR GOITER: ICD-10-CM

## 2025-02-07 DIAGNOSIS — E78.5 HYPERLIPIDEMIA, UNSPECIFIED: ICD-10-CM

## 2025-02-07 PROCEDURE — 99214 OFFICE O/P EST MOD 30 MIN: CPT

## 2025-02-07 PROCEDURE — G2211 COMPLEX E/M VISIT ADD ON: CPT

## 2025-02-07 RX ORDER — MINOXIDIL 2.5 MG/1
2.5 TABLET ORAL DAILY
Refills: 0 | Status: ACTIVE | COMMUNITY

## 2025-02-07 RX ORDER — ALLOPURINOL 100 MG/1
100 TABLET ORAL TWICE DAILY
Refills: 0 | Status: ACTIVE | COMMUNITY

## 2025-03-04 ENCOUNTER — RX RENEWAL (OUTPATIENT)
Age: 65
End: 2025-03-04

## 2025-03-06 ENCOUNTER — RX RENEWAL (OUTPATIENT)
Age: 65
End: 2025-03-06

## 2025-05-01 ENCOUNTER — RX RENEWAL (OUTPATIENT)
Age: 65
End: 2025-05-01

## 2025-06-02 ENCOUNTER — RX RENEWAL (OUTPATIENT)
Age: 65
End: 2025-06-02

## 2025-06-25 ENCOUNTER — RX RENEWAL (OUTPATIENT)
Age: 65
End: 2025-06-25

## 2025-07-01 ENCOUNTER — APPOINTMENT (OUTPATIENT)
Dept: GASTROENTEROLOGY | Facility: CLINIC | Age: 65
End: 2025-07-01

## 2025-07-01 VITALS
BODY MASS INDEX: 23.82 KG/M2 | DIASTOLIC BLOOD PRESSURE: 93 MMHG | SYSTOLIC BLOOD PRESSURE: 142 MMHG | HEART RATE: 100 BPM | OXYGEN SATURATION: 98 % | HEIGHT: 65 IN | RESPIRATION RATE: 14 BRPM | TEMPERATURE: 97.2 F | WEIGHT: 143 LBS

## 2025-07-01 PROBLEM — Z86.79 HISTORY OF INTRACRANIAL ANEURYSM: Status: RESOLVED | Noted: 2025-07-01 | Resolved: 2025-07-01

## 2025-07-01 PROCEDURE — 99214 OFFICE O/P EST MOD 30 MIN: CPT

## 2025-07-01 PROCEDURE — G2211 COMPLEX E/M VISIT ADD ON: CPT

## 2025-07-01 RX ORDER — FLUTICASONE FUROATE AND VILANTEROL TRIFENATATE 100; 25 UG/1; UG/1
100-25 POWDER RESPIRATORY (INHALATION)
Refills: 0 | Status: ACTIVE | COMMUNITY

## 2025-07-01 RX ORDER — TICAGRELOR 60 MG/1
TABLET ORAL
Refills: 0 | Status: ACTIVE | COMMUNITY

## 2025-07-25 ENCOUNTER — RX RENEWAL (OUTPATIENT)
Age: 65
End: 2025-07-25

## 2025-08-01 ENCOUNTER — LABORATORY RESULT (OUTPATIENT)
Age: 65
End: 2025-08-01

## 2025-08-13 ENCOUNTER — APPOINTMENT (OUTPATIENT)
Dept: ENDOCRINOLOGY | Facility: CLINIC | Age: 65
End: 2025-08-13
Payer: MEDICARE

## 2025-08-13 VITALS — WEIGHT: 138 LBS | HEIGHT: 65 IN | BODY MASS INDEX: 22.99 KG/M2

## 2025-08-13 DIAGNOSIS — I10 ESSENTIAL (PRIMARY) HYPERTENSION: ICD-10-CM

## 2025-08-13 DIAGNOSIS — E78.5 HYPERLIPIDEMIA, UNSPECIFIED: ICD-10-CM

## 2025-08-13 DIAGNOSIS — R73.03 PREDIABETES.: ICD-10-CM

## 2025-08-13 DIAGNOSIS — E04.2 NONTOXIC MULTINODULAR GOITER: ICD-10-CM

## 2025-08-13 PROCEDURE — 99214 OFFICE O/P EST MOD 30 MIN: CPT | Mod: 2W

## 2025-08-13 PROCEDURE — G2211 COMPLEX E/M VISIT ADD ON: CPT | Mod: 2W

## 2025-09-08 DIAGNOSIS — Z13.820 ENCOUNTER FOR SCREENING FOR OSTEOPOROSIS: ICD-10-CM
